# Patient Record
Sex: FEMALE | Race: WHITE | NOT HISPANIC OR LATINO | Employment: FULL TIME
[De-identification: names, ages, dates, MRNs, and addresses within clinical notes are randomized per-mention and may not be internally consistent; named-entity substitution may affect disease eponyms.]

---

## 2017-02-23 ENCOUNTER — RECORDS - HEALTHEAST (OUTPATIENT)
Dept: ADMINISTRATIVE | Facility: OTHER | Age: 22
End: 2017-02-23

## 2017-02-23 LAB
BKR LAB AP ABNORMAL BLEEDING: NO
BKR LAB AP BIRTH CONTROL/HORMONES: NORMAL
BKR LAB AP CERVICAL APPEARANCE: NORMAL
BKR LAB AP GYN ADEQUACY: NORMAL
BKR LAB AP GYN INTERPRETATION: NORMAL
BKR LAB AP HPV REFLEX: NORMAL
BKR LAB AP LMP: NORMAL
BKR LAB AP PATIENT STATUS: NORMAL
BKR LAB AP PREVIOUS ABNORMAL: NORMAL
BKR LAB AP PREVIOUS NORMAL: NORMAL
HIGH RISK?: YES
PATH REPORT.COMMENTS IMP SPEC: NORMAL
RESULT FLAG (HE HISTORICAL CONVERSION): NORMAL

## 2022-08-22 RX ORDER — FLUTICASONE PROPIONATE 50 MCG
SPRAY, SUSPENSION (ML) NASAL
Qty: 16 G | OUTPATIENT
Start: 2022-08-22

## 2022-10-21 ENCOUNTER — APPOINTMENT (OUTPATIENT)
Dept: URBAN - METROPOLITAN AREA CLINIC 260 | Age: 27
Setting detail: DERMATOLOGY
End: 2022-10-21

## 2022-10-21 VITALS — WEIGHT: 170 LBS | HEIGHT: 64 IN

## 2022-10-21 DIAGNOSIS — L71.0 PERIORAL DERMATITIS: ICD-10-CM

## 2022-10-21 DIAGNOSIS — R21 RASH AND OTHER NONSPECIFIC SKIN ERUPTION: ICD-10-CM

## 2022-10-21 PROCEDURE — OTHER PRESCRIPTION: OTHER

## 2022-10-21 PROCEDURE — OTHER COUNSELING: OTHER

## 2022-10-21 PROCEDURE — OTHER PRESCRIPTION MEDICATION MANAGEMENT: OTHER

## 2022-10-21 PROCEDURE — OTHER MIPS QUALITY: OTHER

## 2022-10-21 PROCEDURE — 99203 OFFICE O/P NEW LOW 30 MIN: CPT

## 2022-10-21 RX ORDER — FLUCONAZOLE 150 MG/1
150MG TABLET ORAL
Qty: 2 | Refills: 1 | Status: ERX | COMMUNITY
Start: 2022-10-21

## 2022-10-21 RX ORDER — CLOBETASOL PROPIONATE 0.5 MG/G
CREAM TOPICAL TWICE DAILY
Qty: 45 | Refills: 1 | Status: ERX | COMMUNITY
Start: 2022-10-21

## 2022-10-21 RX ORDER — PIMECROLIMUS 10 MG/G
1% CREAM TOPICAL BID
Qty: 60 | Refills: 2 | Status: ERX | COMMUNITY
Start: 2022-10-21

## 2022-10-21 RX ORDER — FLUOCINOLONE ACETONIDE 0.1 MG/ML
0.01% SOLUTION TOPICAL BID
Qty: 60 | Refills: 2 | Status: ERX | COMMUNITY
Start: 2022-10-21

## 2022-10-21 RX ORDER — CEPHALEXIN 500 MG/1
CAPSULE ORAL BID
Qty: 60 | Refills: 0 | Status: ERX | COMMUNITY
Start: 2022-10-21

## 2022-10-21 ASSESSMENT — LOCATION ZONE DERM
LOCATION ZONE: FINGER
LOCATION ZONE: FACE

## 2022-10-21 ASSESSMENT — LOCATION DETAILED DESCRIPTION DERM
LOCATION DETAILED: LEFT PROXIMAL ULNAR DORSAL INDEX FINGER
LOCATION DETAILED: LEFT INFERIOR MEDIAL MALAR CHEEK

## 2022-10-21 ASSESSMENT — LOCATION SIMPLE DESCRIPTION DERM
LOCATION SIMPLE: LEFT CHEEK
LOCATION SIMPLE: LEFT INDEX FINGER

## 2022-10-21 ASSESSMENT — BSA RASH: BSA RASH: 10

## 2022-10-21 NOTE — HPI: RASH
Is This A New Presentation, Or A Follow-Up?: Rash
Additional History: Maternal grandpa, maternal cousin, mom all have psoriasis. No fh of eczema

## 2022-10-21 NOTE — HPI: RASH (PERIORAL DERMATITIS)
Is This A New Presentation, Or A Follow-Up?: Rash
Additional History: She has been using this on and off for three years and it knocks it down but it never takes it away completely. She’s also taken minocycline and doxycycline for this rash but she gets yeast infections with it.

## 2022-10-21 NOTE — PROCEDURE: PRESCRIPTION MEDICATION MANAGEMENT
Render In Strict Bullet Format?: No
Plan: Recommend either bx or treat today however with bx sometimes if not flared will not show as clear of a pic of what the Dx is. Since not flaring today, will treat with topicals.
Plan: Will try cephalexin instead. And will also refill elidel for her
Initiate Treatment: Clobetasol
Continue Regimen: Fluocinolone soln bid x 2 wks
Detail Level: Zone

## 2022-12-29 ENCOUNTER — HOSPITAL ENCOUNTER (INPATIENT)
Facility: CLINIC | Age: 27
LOS: 4 days | Discharge: HOME | DRG: 914 | End: 2023-01-03
Attending: EMERGENCY MEDICINE | Admitting: PSYCHIATRY & NEUROLOGY
Payer: COMMERCIAL

## 2022-12-29 DIAGNOSIS — F41.1 GAD (GENERALIZED ANXIETY DISORDER): ICD-10-CM

## 2022-12-29 DIAGNOSIS — F63.3 TRICHOTILLOMANIA: ICD-10-CM

## 2022-12-29 DIAGNOSIS — Z20.822 CONTACT WITH AND (SUSPECTED) EXPOSURE TO COVID-19: ICD-10-CM
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
## 2022-12-29 DIAGNOSIS — F41.0 PANIC DISORDER: ICD-10-CM

## 2022-12-29 DIAGNOSIS — F40.10 SOCIAL PHOBIA: ICD-10-CM

## 2022-12-29 DIAGNOSIS — F33.2 MAJOR DEPRESSIVE DISORDER, RECURRENT EPISODE, SEVERE WITH MIXED FEATURES (H): ICD-10-CM

## 2022-12-29 DIAGNOSIS — F33.9 EPISODE OF RECURRENT MAJOR DEPRESSIVE DISORDER, UNSPECIFIED DEPRESSION EPISODE SEVERITY (H): ICD-10-CM

## 2022-12-29 DIAGNOSIS — S10.94XA: ICD-10-CM

## 2022-12-29 DIAGNOSIS — F99 INSOMNIA DUE TO OTHER MENTAL DISORDER: Primary | ICD-10-CM

## 2022-12-29 DIAGNOSIS — T14.91XA SUICIDE ATTEMPT (H): ICD-10-CM

## 2022-12-29 DIAGNOSIS — F33.2 SEVERE RECURRENT MAJOR DEPRESSION WITHOUT PSYCHOTIC FEATURES (H): ICD-10-CM

## 2022-12-29 DIAGNOSIS — F51.05 INSOMNIA DUE TO OTHER MENTAL DISORDER: Primary | ICD-10-CM

## 2022-12-29 LAB
AMPHETAMINES UR QL SCN: NORMAL
BARBITURATES UR QL: NORMAL
BENZODIAZ UR QL: NORMAL
CANNABINOIDS UR QL SCN: NORMAL
COCAINE UR QL: NORMAL
HCG UR QL: NEGATIVE
OPIATES UR QL SCN: NORMAL
SARS-COV-2 RNA RESP QL NAA+PROBE: NEGATIVE

## 2022-12-29 PROCEDURE — 90791 PSYCH DIAGNOSTIC EVALUATION: CPT

## 2022-12-29 PROCEDURE — 250N000013 HC RX MED GY IP 250 OP 250 PS 637: Performed by: FAMILY MEDICINE

## 2022-12-29 PROCEDURE — 80307 DRUG TEST PRSMV CHEM ANLYZR: CPT | Performed by: EMERGENCY MEDICINE

## 2022-12-29 PROCEDURE — 250N000013 HC RX MED GY IP 250 OP 250 PS 637: Performed by: EMERGENCY MEDICINE

## 2022-12-29 PROCEDURE — 81025 URINE PREGNANCY TEST: CPT | Performed by: EMERGENCY MEDICINE

## 2022-12-29 PROCEDURE — C9803 HOPD COVID-19 SPEC COLLECT: HCPCS | Performed by: EMERGENCY MEDICINE

## 2022-12-29 PROCEDURE — U0003 INFECTIOUS AGENT DETECTION BY NUCLEIC ACID (DNA OR RNA); SEVERE ACUTE RESPIRATORY SYNDROME CORONAVIRUS 2 (SARS-COV-2) (CORONAVIRUS DISEASE [COVID-19]), AMPLIFIED PROBE TECHNIQUE, MAKING USE OF HIGH THROUGHPUT TECHNOLOGIES AS DESCRIBED BY CMS-2020-01-R: HCPCS | Performed by: EMERGENCY MEDICINE

## 2022-12-29 PROCEDURE — 99285 EMERGENCY DEPT VISIT HI MDM: CPT | Mod: 25 | Performed by: EMERGENCY MEDICINE

## 2022-12-29 PROCEDURE — 99284 EMERGENCY DEPT VISIT MOD MDM: CPT | Performed by: EMERGENCY MEDICINE

## 2022-12-29 RX ORDER — MULTIVIT WITH MINERALS/LUTEIN
1 TABLET ORAL DAILY
COMMUNITY
End: 2022-12-29

## 2022-12-29 RX ORDER — NAPHAZOLINE HCL 0.012 %
1 DROPS OPHTHALMIC (EYE) DAILY PRN
COMMUNITY
End: 2023-03-08

## 2022-12-29 RX ORDER — MULTIVITAMIN/IRON/FOLIC ACID 18MG-0.4MG
1 TABLET ORAL DAILY
COMMUNITY

## 2022-12-29 RX ORDER — SERTRALINE HYDROCHLORIDE 100 MG/1
100 TABLET, FILM COATED ORAL DAILY
Status: ON HOLD | COMMUNITY
End: 2023-01-03

## 2022-12-29 RX ORDER — TRAZODONE HYDROCHLORIDE 50 MG/1
50 TABLET, FILM COATED ORAL ONCE
Status: COMPLETED | OUTPATIENT
Start: 2022-12-29 | End: 2022-12-29

## 2022-12-29 RX ORDER — TRAZODONE HYDROCHLORIDE 50 MG/1
50 TABLET, FILM COATED ORAL
Status: DISCONTINUED | OUTPATIENT
Start: 2022-12-29 | End: 2022-12-31

## 2022-12-29 RX ORDER — HYDROXYZINE HYDROCHLORIDE 25 MG/1
25-50 TABLET, FILM COATED ORAL 2 TIMES DAILY PRN
Status: DISCONTINUED | OUTPATIENT
Start: 2022-12-29 | End: 2022-12-29

## 2022-12-29 RX ORDER — NORETHINDRONE ACETATE AND ETHINYL ESTRADIOL 1MG-20(21)
1 KIT ORAL DAILY
COMMUNITY

## 2022-12-29 RX ORDER — NORETHINDRONE ACETATE AND ETHINYL ESTRADIOL AND FERROUS FUMARATE 5-7-9-7
1 KIT ORAL DAILY
COMMUNITY
End: 2022-12-29

## 2022-12-29 RX ORDER — HYDROXYZINE HYDROCHLORIDE 25 MG/1
50 TABLET, FILM COATED ORAL ONCE
Status: COMPLETED | OUTPATIENT
Start: 2022-12-29 | End: 2022-12-29

## 2022-12-29 RX ORDER — HYDROXYZINE HYDROCHLORIDE 50 MG/1
50 TABLET, FILM COATED ORAL 2 TIMES DAILY
Status: DISCONTINUED | OUTPATIENT
Start: 2022-12-30 | End: 2022-12-30

## 2022-12-29 RX ORDER — HYDROXYZINE HYDROCHLORIDE 25 MG/1
50 TABLET, FILM COATED ORAL 3 TIMES DAILY PRN
Status: DISCONTINUED | OUTPATIENT
Start: 2022-12-29 | End: 2022-12-29

## 2022-12-29 RX ORDER — SERTRALINE HYDROCHLORIDE 100 MG/1
100 TABLET, FILM COATED ORAL DAILY
Status: DISCONTINUED | OUTPATIENT
Start: 2022-12-30 | End: 2023-01-03 | Stop reason: HOSPADM

## 2022-12-29 RX ADMIN — TRAZODONE HYDROCHLORIDE 25 MG: 50 TABLET ORAL at 20:48

## 2022-12-29 RX ADMIN — HYDROXYZINE HYDROCHLORIDE 50 MG: 25 TABLET, FILM COATED ORAL at 17:36

## 2022-12-29 RX ADMIN — TRAZODONE HYDROCHLORIDE 50 MG: 50 TABLET ORAL at 22:39

## 2022-12-29 ASSESSMENT — ENCOUNTER SYMPTOMS
NERVOUS/ANXIOUS: 1
HALLUCINATIONS: 0
RESPIRATORY NEGATIVE: 1
TROUBLE SWALLOWING: 0
DYSPHORIC MOOD: 1
DECREASED CONCENTRATION: 1
HYPERACTIVE: 0
SLEEP DISTURBANCE: 1
VOICE CHANGE: 0

## 2022-12-29 ASSESSMENT — COLUMBIA-SUICIDE SEVERITY RATING SCALE - C-SSRS
TOTAL  NUMBER OF INTERRUPTED ATTEMPTS LIFETIME: NO
REASONS FOR IDEATION PAST MONTH: COMPLETELY TO END OR STOP THE PAIN (YOU COULDN'T GO ON LIVING WITH THE PAIN OR HOW YOU WERE FEELING)
LETHALITY/MEDICAL DAMAGE CODE MOST RECENT ACTUAL ATTEMPT: MINOR PHYSICAL DAMAGE
5. HAVE YOU STARTED TO WORK OUT OR WORKED OUT THE DETAILS OF HOW TO KILL YOURSELF? DO YOU INTEND TO CARRY OUT THIS PLAN?: NO
LETHALITY/MEDICAL DAMAGE CODE FIRST POTENTIAL ATTEMPT: BEHAVIOR LIKELY TO RESULT IN DEATH DESPITE AVAILABLE MEDICAL CARE
MOST LETHAL DATE: 66471
ATTEMPT LIFETIME: YES
1. HAVE YOU WISHED YOU WERE DEAD OR WISHED YOU COULD GO TO SLEEP AND NOT WAKE UP?: YES
TOTAL  NUMBER OF ACTUAL ATTEMPTS LIFETIME: 1
4. HAVE YOU HAD THESE THOUGHTS AND HAD SOME INTENTION OF ACTING ON THEM?: NO
LETHALITY/MEDICAL DAMAGE CODE MOST LETHAL ACTUAL ATTEMPT: MINOR PHYSICAL DAMAGE
REASONS FOR IDEATION LIFETIME: COMPLETELY TO END OR STOP THE PAIN (YOU COULDN'T GO ON LIVING WITH THE PAIN OR HOW YOU WERE FEELING)
FIRST ATTEMPT DATE: 66471
6. HAVE YOU EVER DONE ANYTHING, STARTED TO DO ANYTHING, OR PREPARED TO DO ANYTHING TO END YOUR LIFE?: NO
ATTEMPT PAST THREE MONTHS: YES
5. HAVE YOU STARTED TO WORK OUT OR WORKED OUT THE DETAILS OF HOW TO KILL YOURSELF? DO YOU INTEND TO CARRY OUT THIS PLAN?: YES
LETHALITY/MEDICAL DAMAGE CODE MOST LETHAL POTENTIAL ATTEMPT: BEHAVIOR LIKELY TO RESULT IN DEATH DESPITE AVAILABLE MEDICAL CARE
LETHALITY/MEDICAL DAMAGE CODE MOST RECENT POTENTIAL ATTEMPT: BEHAVIOR LIKELY TO RESULT IN DEATH DESPITE AVAILABLE MEDICAL CARE
2. HAVE YOU ACTUALLY HAD ANY THOUGHTS OF KILLING YOURSELF?: YES
1. IN THE PAST MONTH, HAVE YOU WISHED YOU WERE DEAD OR WISHED YOU COULD GO TO SLEEP AND NOT WAKE UP?: YES
4. HAVE YOU HAD THESE THOUGHTS AND HAD SOME INTENTION OF ACTING ON THEM?: YES
MOST RECENT DATE: 66471
2. HAVE YOU ACTUALLY HAD ANY THOUGHTS OF KILLING YOURSELF?: YES
TOTAL  NUMBER OF ABORTED OR SELF INTERRUPTED ATTEMPTS LIFETIME: NO
3. HAVE YOU BEEN THINKING ABOUT HOW YOU MIGHT KILL YOURSELF?: NO
TOTAL  NUMBER OF ACTUAL ATTEMPTS PAST 3 MONTHS: 1
LETHALITY/MEDICAL DAMAGE CODE FIRST ACTUAL ATTEMPT: MINOR PHYSICAL DAMAGE

## 2022-12-29 ASSESSMENT — ACTIVITIES OF DAILY LIVING (ADL)
ADLS_ACUITY_SCORE: 35

## 2022-12-29 NOTE — ED TRIAGE NOTES
Here for anxiety/depression for the last few months    Recent (sertraline) change in Lexapro for a month and now changing back to her old med (Sertraline 4-5 days ago). Waking up feeling like she wants to die and waking every 1-2 hours. Today tried to hang herself with a tie from a jacket but states she is too scared to actually kill herself.  Here with her father and states she lives with her parents.     Admits to marijuana use on edi.     Reports some Ha at this time but no there pain.     Denies AH/AH/HI

## 2022-12-29 NOTE — CONSULTS
Diagnostic Evaluation Consultation  Crisis Assessment    Patient was assessed: In Person  Patient location: [facility]   Was a release of information signed: [facility]      Referral Data and Chief Complaint  Jossy Matthews is a 27 year old, who uses she/her pronouns, and presents to the ED with family/friends. Patient is referred to the ED by family/friends. Patient is presenting to the ED for the following concerns: suicide attempt.      Informed Consent and Assessment Methods     Patient is her own guardian. Writer met with patient and explained the crisis assessment process, including applicable information disclosures and limits to confidentiality, assessed understanding of the process, and obtained consent to proceed with the assessment. Patient was observed to be able to participate in the assessment as evidenced by cooperation with interview process. Assessment methods included conducting a formal interview with patient, review of medical records, collaboration with medical staff, and obtaining relevant collateral information from family and community providers when available..     Over the course of this crisis assessment provided reassurance, offered validation, engaged patient in problem solving and disposition planning and worked with patient on safety and aftercare planning. Patient's response to interventions was engaged.     Summary of Patient Situation  Pt is brought to ED after suicide attempt.  Pt reports using bathrobe cord to attempt to hang self last night, shared attempt with father today while pt was at work, reports father brought her to ED.  Pt reports worsening of anxiety, racing thoughts, panic over last three months.  Reports hx of social anxiety starting in high school, had been well managed with Sertraline until recently.  Pt reports having increase in anxiety, crying, panic when going to work; spoke with doctor as felt sertraline may not be working as effectively.   Medication change to Lexapro was started beginning of December.  Pt reports significant side affects including excessive sweating, persistent SI, unable to sleep, racing thoughts, panic attacks, racing heart rate.  Pt reports communicating with doctor and discontinuing lexapro and returning to the sertraline as of 12/15.  Dosing for sertraline started at 50mg, to increase to prior dosage of 150-200mg.  Today was first day at 100 mg.  Pt reports sleep has been disrupted, pt sleeps a few hours, reports walking up with racing thoughts, intense SI.  Reports thoughts are of how to end life.  Reports engaging in research on way to end life.  States last night was feeling overwhelmed by racing thoughts, fear, anxiety and panic.  Pt reports tying bathrobe belt to closet and attempting to hang self.  Pt reports having some blood vessels break around mouth as a result.  Pt has hx of trichotillomania, has been pulling out eye lashes, eyebrow hair and scalp hair with increased frequency.  Pt lives with her mom and dad, feels overly dependent on them, fears she is not able to take care of her self, doesn't know how to do things to demonstrate independence.  Employment has been extremely stressful, has difficulty transition into new situations.  Current job in college admissions, is struggling with anxiety attacks, poor concentration, intense fear of not being capable.   Pt denies HI, SIB, A/V hallucinations.  No trauma hx, admits to occasional marijuana use.           Brief Psychosocial History  Pt is a college graduate, reports living with her parents at this time.  Pt lived in [place] for 4 years returning to the [facility] in 2019, since this time has been living with her parents.  Employment has been challenging as pt struggles with social anxiety, transitioning, self esteem.  Pt reports anxiety was well managed while in [place], reports this time as the best of her life.  States since moving back, has felt unable to manage  independence  Feels guilty and ashamed of being unable to launch.    Significant Clinical History  Pt dx with social anxiety in high school.  Has engaged in individual therapy throughout her life.  Does not have a current therapist, has signed up for [facility].  Medication management through her PCP.  No hx of  hospitalizations, programmatic care.  Denies trauma hx.     Collateral Information  Collateral information from father, Wei.  Wei reports pt has been on a 'downward slide' for 3 months.  Has struggled with social anxiety since high school.  Pt is having a great deal of difficult in employment, struggles to 'acclimate', very anxious.  Reports pt told him today about suicide attempt last night.  Father picked her up from work and brought her in to ED.  Father reports close relationship with daughter, mother and daughter are bit conflicted.      Risk Assessment   Boyd Suicide Severity Rating Scale Full Clinical Version:12/29/22  Suicidal Ideation  1. Wish to be Dead (Lifetime): Yes  1. Wish to be Dead (Past 1 Month): Yes  2. Non-Specific Active Suicidal Thoughts (Lifetime): Yes  2. Non-Specific Active Suicidal Thoughts (Past 1 Month): Yes  3. Active Suicidal Ideation with any Methods (Not Plan) Without Intent to Act (Lifetime): No  3. Active Suicidal Ideation with any Methods (Not Plan) Without Intent to Act (Past 1 Month): Yes  4. Active Suicidal Ideation with Some Intent to Act, Without Specific Plan (Lifetime): No  4. Active Suicidal Ideation with Some Intent to Act, Without Specific Plan (Past 1 Month): Yes  5. Active Suicidal Ideation with Specific Plan and Intent (Lifetime): No  5. Active Suicidal Ideation with Specific Plan and Intent (Past 1 Month): Yes  Active Suicidal Ideation with Specific Plan and Intent Description (Past 1 Month): hanging,  Intensity of Ideation  Most Severe Ideation Rating (Lifetime): 2  Most Severe Ideation Rating (Past 1 Month): 5  Description of Most Severe Ideation  (Past 1 Month): hanging,wanting to die  Frequency (Lifetime): Less than once a week  Frequency (Past 1 Month): Many times each day  Duration (Lifetime): Fleeting, few seconds or minutes  Duration (Past 1 Month): 4-8 hours/most of day  Controllability (Lifetime): Easily able to control thoughts  Controllability (Past 1 Month): Unable to control thoughts  Deterrents (Lifetime): Does not apply  Deterrents (Past 1 Month): Deterrents definitely did not stop you  Reasons for Ideation (Lifetime): Completely to end or stop the pain (You couldn't go on living with the pain or how you were feeling)  Reasons for Ideation (Past 1 Month): Completely to end or stop the pain (You couldn't go on living with the pain or how you were feeling)  Suicidal Behavior  Actual Attempt (Lifetime): Yes  Total Number of Actual Attempts (Lifetime): 1  Actual Attempt Description (Lifetime): attempted to hang self  Actual Attempt (Past 3 Months): Yes  Total Number of Actual Attempts (Past 3 Months): 1  Actual Attempt Description (Past 3 Months): see above  Has subject engaged in non-suicidal self-injurious behavior? (Lifetime): No  Interrupted Attempts (Lifetime): No  Aborted or Self-Interrupted Attempt (Lifetime): No  Preparatory Acts or Behavior (Lifetime): No  C-SSRS Risk (Lifetime/Recent)  Calculated C-SSRS Risk Score (Lifetime/Recent): High Risk      Actual/Potential Lethality (Most Lethal Attempt)  Most Lethal Attempt Date: 12/28/22  Actual Lethality/Medical Damage Code (Most Lethal Attempt): Minor physical damage  Potential Lethality Code (Most Lethal Attempt): Behavior likely to result in death despite available medical care       Validity of evaluation is not impacted by presenting factors during interview Pt is forthcoming, cooperative.   Comments regarding subjective versus objective responses to [place] tool: see narrative  Environmental or Psychosocial Events: work or task failure, helplessness/hopelessness, impulsivity/recklessness  and social isolation  Chronic Risk Factors: chronic and ongoing sleep difficulties, parental mental health issue and serious, persistent mental illness   Warning Signs: seeking access to means to hurt or kill self, talking or writing about death, dying, or suicide, anxiety, agitation, unable to sleep, sleeping all the time and no reason for living, no sense of purpose in life  Protective Factors: strong bond to family unit, community support, or employment, able to access care without barriers and help seeking  Interpretation of Risk Scoring, Risk Mitigation Interventions and Safety Plan:  Pt is at heightened risk due to poor sleep, increased anxiety and panic, instability following failed medication trial      Does the patient have thoughts of harming others? No     Is the patient engaging in sexually inappropriate behavior?  no        Current Substance Abuse     Is there recent substance abuse? Reports using THC occasionally.     Was a urine drug screen or blood alcohol level obtained: Yes pending       Mental Status Exam     Affect: Flat   Appearance: Appropriate    Attention Span/Concentration: Attentive  Eye Contact: Engaged   Fund of Knowledge: Appropriate    Language /Speech Content: Fluent   Language /Speech Volume: Normal    Language /Speech Rate/Productions: Normal    Recent Memory: Intact   Remote Memory: Intact   Mood: Anxious and Depressed    Orientation to Person: Yes    Orientation to Place: Yes   Orientation to Time of Day: Yes    Orientation to Date: Yes    Situation (Do they understand why they are here?): Yes    Psychomotor Behavior: Normal    Thought Content: Suicidal   Thought Form: Intact      History of commitment: No      Medication    Psychotropic medications: Sertraline  Medication changes made in the last two weeks: Yes, pt had a failed trial of Lexapro.  Restarted sertraline mid December.       Current Care Team    [facility]  Psychiatrist: No  Therapist: No  : No     CTSS or ARMHS: No  ACT Team: No  Other: No      Diagnosis    300.23 (F40.10) Social Anxiety Disorder  300.01 (F41.0) Panic Disorder  300.02 (F41.1) Generalized Anxiety Disorder   296.33 (F33.2) Major Depressive Disorder, Recurrent Episode, Severe _ and With anxious distress - primary   F63.3 Trichotillomania     Clinical Summary and Substantiation of Recommendations    Pt presenting to ED with recent suicide attempt by hanging.  Reports increase in symptoms over past three months with increasing SI, panic, poor concentration, disrupted sleep, and depressed mood, symptoms have disrupted employment and daily living.      Admission to Inpatient Level of Care is indicated due to:    1. Patient risk of severity of behavioral health disorder is appropriate to proposed level of care as indicated by:    Imminent Risk of Harm: Very Recent suicide attempt or deliberate act of serious harm to self WITHOUT relief of factors precipitating the attempt or act  And/or:  Behavioral health disorder is present and appropriate for inpatient care with both of the following:     Severe psychiatric, behavioral or other comorbid conditions are appropriate for management at inpatient mental health as indicated by at least one of the following:   o Depressive symptoms, Anxiety and Obsessions or compulsions, Impaired impulse control, judgement, or insight and Other emotional behavioral or behavioral disturbance     Severe dysfunction in daily living is present as indicated by at least one of the following:   o Extreme deterioration in social interactions and Complete inability to maintain any appropriate aspect of personal responsibility in any adult roles    2. Inpatient mental health services are necessary to meet patient needs and at least one of the following:  Specific condition related to admission diagnosis is present and judged likely to further improve at proposed level of  care    3. Situation and expectations are appropriate for inpatient care, as indicated by one of the following:   Around-the-clock medical and nursing care to address symptoms and initiate intervention is required and Patient management/treatment at lower level of care is not feasible or is inappropriate    Disposition    Recommended disposition: Inpatient Mental Health       Reviewed case and recommendations with attending provider. Attending Name: Dr Connor       Attending concurs with disposition: Yes       Patient concurs with disposition: Yes       Guardian concurs with disposition: NA      Final disposition: Inpatient mental health .     Inpatient Details (if applicable):   Is patient admitted voluntarily:Yes      Patient aware of potential for transfer if there is not appropriate placement? Yes       Patient is willing to travel outside of the [facility] [place] for placement? [place] only       Behavioral Intake Notified? 12/29/22  512pm        Assessment Details    Patient interview started at: 223 and completed at: 315.     Total duration spent on the patient case in minutes: 1.75 hrs      CPT code(s) utilized: 35077 - Psychotherapy for Crisis - 60 (30-74*) min       Ashley Haines, CELIA, MSW, LICSW, Psychotherapist  [facility] - Triage & Transition Services  Callback: [phone]

## 2022-12-29 NOTE — ED PROVIDER NOTES
ED Provider Note  [facility]      History     Chief Complaint   Patient presents with     Suicide Attempt     HPI  Jossy Matthews is a 27 year old female with anxiety and MDD who presents to the ED with her father.  He encouraged her to come.  She tied the belt of her bathrobe about her neck yesterday and tied it to the door as well.  She did not lose consciousness but does have petechia around her jaw/chin and a jennifer on her neck from it.  She says the anxiety was just too terrible last night and she has not been sleeping the last month and wanted to end her life because of this.  She says there was a part of her that didn't want to die and so she's not sure if it was actually a suicide attempt.  No prior attempts or prior admissions.  She says that she has been taking sertraline for years but was having anxiety about going to work in December and felt that the sertraline wasn't working anymore.  She therefore talked to her pmd who recommended she try lexapro.   She says the side effects of lexapro was awful. Her anxiety has been worse with panic.  She has not been able to sleep the past month.  She started on sertraline 5-5 days ago.  She started at 50 mg daily and now starting today took sertraline 100 mg.  Her sertraline dose prior to trying lexapro was 200 mg daily.  She has also been pulling her eyebrow hairs.  Denies cd concerns.  She struggles with positive self talk, and feels she is a failure to launch and grow up.  Her parents are supportive.  She feels depressed.  She is having trouble with making decisions.  She is anxious and fearful about everything.  Her mind races. She noticed petechia around her jaw from last night.        Past Medical History  Past Medical History:   Diagnosis Date     Anxiety      Depressive disorder      History reviewed. No pertinent surgical history.  multivitamin (CENTRUM SILVER) tablet  norethindrone-ethinyl estradiol-iron (ESTROSTEP FE)  "1-20/1-30/1-35 MG-MCG tablet  sertraline (ZOLOFT) 100 MG tablet      No Known Allergies  Family History  History reviewed. No pertinent family history.  Social History   Social History     Tobacco Use     Smoking status: Never     Smokeless tobacco: Never   Substance Use Topics     Alcohol use: Not Currently     Drug use: Yes     Types: Marijuana      Past medical history, past surgical history, medications, allergies, family history, and social history were reviewed with the patient. No additional pertinent items.       Review of Systems   HENT: Negative for trouble swallowing and voice change.    Respiratory: Negative.    Skin: Positive for rash.   Psychiatric/Behavioral: Positive for decreased concentration, dysphoric mood and sleep disturbance. Negative for hallucinations and self-injury. Suicidal ideas: not currently. The patient is nervous/anxious. The patient is not hyperactive.    All other systems reviewed and are negative.    A complete review of systems was performed with pertinent positives and negatives noted in the HPI, and all other systems negative.    Physical Exam   BP: 125/77  Pulse: 104  Temp: 99  F (37.2  C)  Resp: 18  Height: 162.6 cm (5' 4\")  Weight: 77.1 kg (170 lb)  SpO2: 98 %  Physical Exam  Vitals and nursing note reviewed.   HENT:      Head: Normocephalic and atraumatic.      Nose: No congestion or rhinorrhea.   Eyes:      Extraocular Movements: Extraocular movements intact.   Neck:      Comments: 4 cm linear superficial abrasion to left neck. Several petechia around jawline.  No airway compromise.    Cardiovascular:      Rate and Rhythm: Tachycardia present.   Pulmonary:      Effort: Pulmonary effort is normal.   Musculoskeletal:         General: No deformity or signs of injury.      Cervical back: Normal range of motion.   Neurological:      Mental Status: She is alert.         ED Course      Procedures       The medical record was reviewed and interpreted.  Current labs reviewed and " interpreted.  Mental Health Risk Assessment      PSS-3    Date and Time Over the past 2 weeks have you felt down, depressed, or hopeless? Over the past 2 weeks have you had thoughts of killing yourself? Have you ever attempted to kill yourself? When did this last happen? User   12/29/22 1342 yes yes yes within the last 24 hours (including today) MM      C-SSRS ([place])    Date and Time Q1 Wished to be Dead (Past Month) Q2 Suicidal Thoughts (Past Month) Q3 Suicidal Thought Method Q4 Suicidal Intent without Specific Plan Q5 Suicide Intent with Specific Plan Q6 Suicide Behavior (Lifetime) Within the Past 3 Months? RETIRED: Level of Risk per Screen Screening Not Complete User   12/29/22 1342 yes yes yes no yes yes -- -- -- MM              Suicide assessment completed by mental health (D.E.C., LCSW, etc.)       Results for orders placed or performed during the hospital encounter of 12/29/22   Asymptomatic COVID-19 Virus (Coronavirus) by PCR Nasopharyngeal     Status: Normal    Specimen: Nasopharyngeal; Swab   Result Value Ref Range    SARS CoV2 PCR Negative Negative    Narrative    Testing was performed using the Xpert Xpress SARS-CoV-2 Assay on the Cepheid Gene-Xpert Instrument Systems. Additional information about this Emergency Use Authorization (EUA) assay can be found via the Lab Guide. This test should be ordered for the detection of SARS-CoV-2 in individuals who meet SARS-CoV-2 clinical and/or epidemiological criteria as well as from individuals without symptoms or other reasons to suspect COVID-19. Test performance for asymptomatic patients has only been established in anterior nasal swab specimens. This test is for in vitro diagnostic use under the FDA EUA for laboratories certified under CLIA to perform high complexity testing. This test has not been FDA cleared or approved. A negative result does not rule out the presence of PCR inhibitors in the specimen or target RNA concentration below the limit of  detection for the assay. The possibility of a false negative should be considered if the patient's recent exposure or clinical presentation suggests COVID-19. This test was validated by the [facility]. This laboratory is certified under the Clinical Laboratory Improvement Amendments (CLIA) as qualified to perform high complexity laboratory testing.     HCG qualitative urine (UPT)     Status: Normal   Result Value Ref Range    hCG Urine Qualitative Negative Negative   Drug abuse screen 1 urine (ED)     Status: Normal   Result Value Ref Range    Amphetamines Urine Screen Negative Screen Negative    Barbiturates Urine Screen Negative Screen Negative    Benzodiazepines Urine Screen Negative Screen Negative    Cannabinoids Urine Screen Negative Screen Negative    Cocaine Urine Screen Negative Screen Negative    Opiates Urine Screen Negative Screen Negative   Urine Drugs of Abuse Screen     Status: Normal    Narrative    The following orders were created for panel order Urine Drugs of Abuse Screen.  Procedure                               Abnormality         Status                     ---------                               -----------         ------                     Drug abuse screen 1 urin...[[id number]]  Normal              Final result                 Please view results for these tests on the individual orders.     Medications   hydrOXYzine (ATARAX) tablet 50 mg (has no administration in time range)   sertraline (ZOLOFT) tablet 100 mg (has no administration in time range)   traZODone (DESYREL) half-tab 25 mg (has no administration in time range)     Or   traZODone (DESYREL) tablet 50 mg (has no administration in time range)   melatonin tablet 5 mg (has no administration in time range)   hydrOXYzine (ATARAX) tablet 50 mg (has no administration in time range)   hydrOXYzine (ATARAX) tablet 25-50 mg (has no administration in time range)        Assessments & Plan (with Medical  Decision Making)   The patient has hx of MDD and GURU and presents to the ED with her father after trying to strangle herself yesterday.  She tied her bathrobe belt around her neck and the door and did attempt enough to cause petechia to form around her jaw line.  No loc. She says she thinks she really didn't want to kill herself and wonders if she should go home because being here is making her tremendously anxious. However she says the anxiety and lack of sleep is what lead to last night's attempt which did seem impulsive.  She is agreeable to admission but is holdable if she wishes to leave.  I will start her on atarax and trazodone and will continue her sertraline while waiting in the ED  No airway compromise on exam. She will board in the ED until an inpatient mental health bed is available.     I have reviewed the nursing notes. I have reviewed the findings, diagnosis, plan and need for follow up with the patient.    Medical Decision Making  The patient presented with a problem that is an acute health issue posing potential threat to life or bodily function.    The patient's evaluation involved:  an assessment requiring an independent historian (father)  review of 3+ prior external note(s) (see separate area of note for details)     The patient's evaluation involved:  ordering and review of 2 test(s) (see separate area of note for details)  discussion of management or test interpretation with another health professional (see separate area of note for details)    The patient's management involved a decision regarding hospitalization.        New Prescriptions    No medications on file       Final diagnoses:   GURU (generalized anxiety disorder)   Suicide attempt (H)   Episode of recurrent major depressive disorder, unspecified depression episode severity (H)       --  Johana Connor MD  [facility] EMERGENCY DEPARTMENT  12/29/2022     Johana Connor MD  12/29/22 1734       Johana Connor MD  12/29/22 1237

## 2022-12-29 NOTE — ED TRIAGE NOTES
Triage Assessment     Row Name 12/29/22 1342       Triage Assessment (Adult)    Airway WDL WDL       Respiratory WDL    Respiratory WDL WDL       Skin Circulation/Temperature WDL    Skin Circulation/Temperature WDL WDL       Cardiac WDL    Cardiac WDL WDL       Peripheral/Neurovascular WDL    Peripheral Neurovascular WDL WDL       Cognitive/Neuro/Behavioral WDL    Cognitive/Neuro/Behavioral WDL WDL

## 2022-12-30 PROCEDURE — 250N000013 HC RX MED GY IP 250 OP 250 PS 637: Performed by: PSYCHIATRY & NEUROLOGY

## 2022-12-30 PROCEDURE — 124N000002 HC R&B MH UMMC

## 2022-12-30 PROCEDURE — 250N000013 HC RX MED GY IP 250 OP 250 PS 637: Performed by: EMERGENCY MEDICINE

## 2022-12-30 PROCEDURE — 99223 1ST HOSP IP/OBS HIGH 75: CPT | Mod: AI | Performed by: PSYCHIATRY & NEUROLOGY

## 2022-12-30 PROCEDURE — 90853 GROUP PSYCHOTHERAPY: CPT

## 2022-12-30 RX ORDER — ACETAMINOPHEN 325 MG/1
650 TABLET ORAL EVERY 4 HOURS PRN
Status: DISCONTINUED | OUTPATIENT
Start: 2022-12-30 | End: 2023-01-03 | Stop reason: HOSPADM

## 2022-12-30 RX ORDER — AMOXICILLIN 250 MG
1 CAPSULE ORAL 2 TIMES DAILY PRN
Status: DISCONTINUED | OUTPATIENT
Start: 2022-12-30 | End: 2023-01-03 | Stop reason: HOSPADM

## 2022-12-30 RX ORDER — HYDROXYZINE HYDROCHLORIDE 50 MG/1
50 TABLET, FILM COATED ORAL EVERY 4 HOURS PRN
Status: DISCONTINUED | OUTPATIENT
Start: 2022-12-30 | End: 2023-01-03 | Stop reason: HOSPADM

## 2022-12-30 RX ORDER — OLANZAPINE 10 MG/1
10 TABLET ORAL 3 TIMES DAILY PRN
Status: DISCONTINUED | OUTPATIENT
Start: 2022-12-30 | End: 2023-01-03 | Stop reason: HOSPADM

## 2022-12-30 RX ORDER — OLANZAPINE 10 MG/2ML
10 INJECTION, POWDER, FOR SOLUTION INTRAMUSCULAR 3 TIMES DAILY PRN
Status: DISCONTINUED | OUTPATIENT
Start: 2022-12-30 | End: 2023-01-03 | Stop reason: HOSPADM

## 2022-12-30 RX ORDER — BUSPIRONE HYDROCHLORIDE 10 MG/1
10 TABLET ORAL 3 TIMES DAILY
Status: DISCONTINUED | OUTPATIENT
Start: 2022-12-30 | End: 2023-01-03 | Stop reason: HOSPADM

## 2022-12-30 RX ORDER — TRAZODONE HYDROCHLORIDE 50 MG/1
50 TABLET, FILM COATED ORAL
Status: DISCONTINUED | OUTPATIENT
Start: 2022-12-30 | End: 2022-12-30

## 2022-12-30 RX ORDER — MAGNESIUM HYDROXIDE/ALUMINUM HYDROXICE/SIMETHICONE 120; 1200; 1200 MG/30ML; MG/30ML; MG/30ML
30 SUSPENSION ORAL EVERY 4 HOURS PRN
Status: DISCONTINUED | OUTPATIENT
Start: 2022-12-30 | End: 2023-01-03 | Stop reason: HOSPADM

## 2022-12-30 RX ORDER — HYDROXYZINE HYDROCHLORIDE 25 MG/1
25 TABLET, FILM COATED ORAL EVERY 4 HOURS PRN
Status: DISCONTINUED | OUTPATIENT
Start: 2022-12-30 | End: 2022-12-30

## 2022-12-30 RX ORDER — NORETHINDRONE ACETATE AND ETHINYL ESTRADIOL 1MG-20(21)
1 KIT ORAL DAILY
Status: DISCONTINUED | OUTPATIENT
Start: 2022-12-30 | End: 2023-01-03 | Stop reason: HOSPADM

## 2022-12-30 RX ADMIN — BUSPIRONE HYDROCHLORIDE 10 MG: 10 TABLET ORAL at 15:46

## 2022-12-30 RX ADMIN — HYDROXYZINE HYDROCHLORIDE 50 MG: 50 TABLET ORAL at 19:24

## 2022-12-30 RX ADMIN — NORETHINDRONE ACETATE AND ETHINYL ESTRADIOL 1 TABLET: KIT at 21:07

## 2022-12-30 RX ADMIN — HYDROXYZINE HYDROCHLORIDE 50 MG: 50 TABLET ORAL at 14:19

## 2022-12-30 RX ADMIN — SERTRALINE HYDROCHLORIDE 100 MG: 100 TABLET ORAL at 08:30

## 2022-12-30 RX ADMIN — DIVALPROEX SODIUM 750 MG: 500 TABLET, FILM COATED, EXTENDED RELEASE ORAL at 15:46

## 2022-12-30 RX ADMIN — BUSPIRONE HYDROCHLORIDE 10 MG: 10 TABLET ORAL at 19:24

## 2022-12-30 RX ADMIN — HYDROXYZINE HYDROCHLORIDE 25 MG: 25 TABLET ORAL at 03:58

## 2022-12-30 RX ADMIN — HYDROXYZINE HYDROCHLORIDE 50 MG: 50 TABLET ORAL at 08:30

## 2022-12-30 RX ADMIN — TRAZODONE HYDROCHLORIDE 50 MG: 50 TABLET ORAL at 21:09

## 2022-12-30 ASSESSMENT — ACTIVITIES OF DAILY LIVING (ADL)
ORAL_HYGIENE: INDEPENDENT
ORAL_HYGIENE: INDEPENDENT
DRESS: SCRUBS (BEHAVIORAL HEALTH)
ADLS_ACUITY_SCORE: 28
ADLS_ACUITY_SCORE: 28
HEARING_DIFFICULTY_OR_DEAF: NO
ADLS_ACUITY_SCORE: 45
HYGIENE/GROOMING: INDEPENDENT
DRESS: SCRUBS (BEHAVIORAL HEALTH)
ADLS_ACUITY_SCORE: 28
HYGIENE/GROOMING: INDEPENDENT
ADLS_ACUITY_SCORE: 28
LAUNDRY: WITH SUPERVISION
TOILETING_ISSUES: NO
DIFFICULTY_EATING/SWALLOWING: NO
ADLS_ACUITY_SCORE: 28
ORAL_HYGIENE: INDEPENDENT
DRESSING/BATHING_DIFFICULTY: NO
ADLS_ACUITY_SCORE: 28
WALKING_OR_CLIMBING_STAIRS_DIFFICULTY: NO
ADLS_ACUITY_SCORE: 35
CHANGE_IN_FUNCTIONAL_STATUS_SINCE_ONSET_OF_CURRENT_ILLNESS/INJURY: NO
FALL_HISTORY_WITHIN_LAST_SIX_MONTHS: NO
WEAR_GLASSES_OR_BLIND: NO
DOING_ERRANDS_INDEPENDENTLY_DIFFICULTY: NO
ADLS_ACUITY_SCORE: 28
HYGIENE/GROOMING: INDEPENDENT
CONCENTRATING,_REMEMBERING_OR_MAKING_DECISIONS_DIFFICULTY: NO
ADLS_ACUITY_SCORE: 28
DRESS: INDEPENDENT
DIFFICULTY_COMMUNICATING: NO
ADLS_ACUITY_SCORE: 28
ADLS_ACUITY_SCORE: 28

## 2022-12-30 NOTE — PLAN OF CARE
12/30/22 0428   Patient Belongings   Did you bring any home meds/supplements to the hospital?  No   Patient Belongings locker   Patient Belongings Put in Hospital Secure Location (Security or Locker, etc.) other (see comments)   Belongings Search Yes   Clothing Search Yes   Second Staff B.SDaquan     In locker:  2 sweater  Bra  Pair of underwear  Pair of socks  Pair of shoes  Pair of pants  Coat    A               Admission:  I am responsible for any personal items that are not sent to the safe or pharmacy.  [facility] is not responsible for loss, theft or damage of any property in my possession.    Signature:  _________________________________ Date: _______  Time: _____                                              Staff Signature:  ____________________________ Date: ________  Time: _____      2nd Staff person, if patient is unable/unwilling to sign:    Signature: ________________________________ Date: ________  Time: _____     Discharge:  [facility] has returned all of my personal belongings:    Signature: _________________________________ Date: ________  Time: _____                                          Staff Signature:  ____________________________ Date: ________  Time: _____

## 2022-12-30 NOTE — PROGRESS NOTES
Triage & Transition Services, Extended Care    Client Name: Jossy Matthews    Date: December 29, 2022  Service Type:  Group Therapy  Session Start Time:  5:55P    Session End Time: 6:10P  Session Length: 15min  Site Location: [facility]  Attendees: Patient and other group members  Facilitator: Rajani Irwin     Topic:   Dealing with Setbacks    Intervention:    Group process: support, challenge, affirm, psycho-education.     Response:  Patient did participate in group. Behavior in group was appropriate. Patient shared their goals. With verbal prompts, patient was able to identify goals and positive self-talk.        Rajani Irwin

## 2022-12-30 NOTE — PLAN OF CARE
12/30/22 1607   Individualization/Patient Specific Goals   Patient Personal Strengths community support;expressive of emotions;family/social support;intellectual cognitive skills;interests/hobbies;medication/treatment adherence;positive educational history;positive vocational history;socioeconomic stability   Patient Vulnerabilities lacks insight into illness;poor impulse control   Interprofessional Rounds   Participants CTC;psychiatrist;nursing   Behavioral Team Discussion   Participants Dr. Isidra Banda; Facundo Oviedo, Emy Macias [facility]   Progress new admission   Anticipated length of stay 1 week   Continued Stay Criteria/Rationale Patient requires stabilization of psychiatric symptoms   Plan Patient will receive psychiatric and nursing cares on unit, will be offered OT and psychotherapy groups. CTC will assist with treatment/disposition planning, as well as plans for outpatient services for following discharge.   Anticipated Discharge Disposition home with family     PRECAUTIONS AND SAFETY    Behavioral Orders   Procedures    Code 1 - Restrict to Unit    Discontinue 1:1 attendant for suicide risk     Order Specific Question:   I have performed an in person assessment of the patient     Answer:   Based on this assessment the patient no longer requires a one on one attendant at this point in time.    Routine Programming     As clinically indicated    Status 15     Every 15 minutes.    Suicide precautions     Patients on Suicide Precautions should have a Combination Diet ordered that includes a Diet selection(s) AND a Behavioral Tray selection for Safe Tray - with utensils, or Safe Tray - NO utensils         Safety  Safety WDL: WDL  Patient Location: patient room, own  Observed Behavior: calm  Safety Measures: suicide check-in completed, suicide assessment completed, safety rounds completed  Suicidality: Status 15, Unpredictable frequency of checking on patient           Evelyn

## 2022-12-30 NOTE — H&P
PSYCHIATRY HISTORY AND PHYSICAL         DATE OF SERVICE:   12/30/2022         CHIEF COMPLAINT:     Depression, anxiety, insomnia, suicide attempt prior to admission          HISTORY OF PRESENT ILLNESS:     This is a 27 year old   female with major depressive disorder and anxiety.  She was brought to the emergency room by her father.    I coordinated care by reviewing the ER in the inpatient unit.    According to ER Dr. Nikolas Vaughn's report from 12/29/2022, patient tied the belt of her bottle of around her neck on 12/28/2022 and she tied into the door as well.  She did not lose consciousness but she had petechiae was around her jaw and marks on her neck from it.  She reported that the anxiety was so terrible and she was not able to sleep the last month and she wanted to end her life because of that.  She says part of her did not want to die so she was not sure if it was a suicide attempt.  No previous suicide attempts.  She reported that she was on Zoloft for years and she was anxious about going to work in December so she thought that medication did not work.  Her PMD recommended to try Lexapro.  She said that she had side effects and anxiety was worse and she started having panic attacks.  She was not able to sleep in the last month.  She started taking Zoloft 50 mg 5 days ago and she increased to 200 mg daily.  She says that the dose before starting Lexapro was 200 mg daily.  She also has hypokalemia and she pulls her eyebrows here out.  She s reported that she saw herself as a failure to lounge and blowup.  She has supportive parents.  She reported difficulties with making decisions, anxiety and fearful about everything.  She reported racing thoughts.  She reported to the nurse that she was waking up every morning feeling that she wanted to die.  She could not sleep through the night and she woke up every 1-2 hours.  According to the  Ashley Haines 's note from 12/29/2022 patient reported worsening of  anxiety and racing thoughts and panic attacks in the last 3 months.  She reported that social anxiety started in high school and it responded well to Zoloft until recently.  She reported that she had decrease in anxiety, panic attacks and crying when she was supposed to go to work.  She started taking Lexapro in the beginning of December and she had excessive sweating and persistent suicidal thoughts, inability to sleep, racing thoughts, racing heart rate.  Lexapro was discontinued and she started Zoloft on December 15, 1950 milligrams daily with plan to gradually increase dose to 200 mg which was the maximum dose before she stopped taking Zoloft.  The dose was increased to 100 mg on December 29, 2022.  She reported intense suicidal thoughts about to end her life.  She reported that she was doing research on the way to end her life.  She reported that she felt overwhelmed with racing thoughts, fear, anxiety and panic so she tied bilateral belt to the closet and attempted to hang herself.  She had bruises on her neck.  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  She reported that she did not know how to take care of herself and she was dependent on her parents to help her.  Employment is stressful.  She works in college admissions.  She has anxiety, decreased concentration, fear inadequacy.  She uses marijuana occasionally.  She graduated from college and she lived in [place] for 4 years.  She came back to [place] in 2019 and she has been living with her parents.  She reported that she had anxiety when she was in [place] but she was able to manage it and that was the best part of her life.  She felt guilty and ashamed of being unable to lounge.  She reported anxiety starting in high school.  She had individual psychotherapy all her life.  She does not have psychotherapist at this time, but she signed out for [facility] health clinic.  A primary care prescribes her  medications.  Emergency room  talked collateral information from patient's father.  He reported that patient's mental health has been deteriorating in the last 3 months.  She has had social anxiety since high school.  She has difficult time in employment, difficulties with adjustment, anxious, she told her father about last night suicide attempt and he picked her up from work and brought her to the emergency room.  He reported that he has better relationship with patient and that her relationship with her mother is somewhat conflicted.  During the interview with me she reports that she did not sleep well last night.  Staff reported that she slept about 1.25 hours last night.    During the interview with me pt says she did well on Zoloft x 10 years. She developed depression and anxiety around age 16 and 17. She reports that stress increases depression and anxiety. She says she lived in [place] x 4 years, while she was  to Chinese man. She says she went there to study Chinese. She did some jobs she found stressful. 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 She says that life was easier in [place] where she had free medical care, easy transportation, stores everywhere. She lived in [facility].She says she decided to divorce her  because they grew apart. She wanted divorce, and she came back to [place] in 2019. She says adjustment has been very difficult.   She says she is not able to function in dependently . She lives with her parents. She says her mother was lieutenant, and she behaves like that at home, and has control of everyone, and that cause friction between 2 of them. She says that her mother pushes her to be more functional and that causes anxiety. She says that  She started new job in July and it is very stressful. 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 She  says that she had difficulties adjusting ,and depression and anxiety were getting worse. She says that she asked her PMD for different medication, because she thought Zoloft did not work. She had severe anxiety, racing thoughts, problem with sleep. Her doctor ordered Lexapro on 12/2/22. She says depression and anxiety and insomnia got worse. She says she had panic atacs, she was waking up every one to two hours. She says she could not tolerate going to work . She had racing thoughts. She says that in July her boss used to tell her to slow down because she was too energetic.She says her symptoms got worse in October. She says her boss and people at work tried to help. She says that she started to have insomnia , she could not focus, had more racing thoughts.   She says that she started having suicidal thoughts and thinking about plan. She says she could not tolerate the level of depression, anxiety and insomnia, so she asked her PMD to puth her back on Zoloft. She says she was put back on Zoloft on 12/22, not 12/15, as it was mentioned in the ER notes. She says that yesterday was the first day that it was raised to 100 mg , with plan to put her back on 200 mg which was the maximum dose before switch to Lexapro. Her symptoms continued and she attempted  to kill self on 12/28/22.She says she tied bathrobe belt around her neck and in closet and attempted to hang self, but she stopped it because the pressure of a belt on her neck was painful, and she pulled back . She had marks on her neck. She minimizes her symptoms. She says she told her father about suicide, and he brought her to the hospital.We discussed diagnosis and tx plan. We discussed depression with mixed features and bipolar disorder in differential diagnosis. We discussed anxiety and insomnia. She had insomnia racing thoughts, increased energy, depression and mood lability. We discussed adding Buspar for anxiety and Depakote for mood stabilization. She is on  birth control and she does not plan to get pregnant soon, so Depakote can be used. She had serious suicide attempt and she minimizes her symptoms and she tries to justify it. She is not safe for discharge . She is voluntary , but holdable. We discussed referral to psychiatrist and psychotherapist and she agrees. She will think about day tx.           CHEMICAL DEPENDENCY HISTORY:     History   Drug Use     Types: Marijuana     Rare marijuana use     Social History    Substance and Sexual Activity      Alcohol use: Not Currently    Pt denies     History   Smoking Status     Never   Smokeless Tobacco     Never     Chemical dependency treatments:pt denies   DUI:pt denies   Withdrawal seizures:pt denies          PAST PSYCHIATRIC HISTORY:     Hospitalizations: This is 1 st  ECT: pt denies   Suicide Attempts:this is 1 st   Gun Access: pt denies  Community Supports: her family         PAST MEDICAL HISTORY:     Past Medical History:   Diagnosis Date     Anxiety      Depressive disorder        Medications:     hydrOXYzine  50 mg Oral BID     sertraline  100 mg Oral Daily     Medications as needed: acetaminophen, alum & mag hydroxide-simethicone, hydrOXYzine, melatonin, OLANZapine **OR** OLANZapine, senna-docusate, traZODone **OR** traZODone    ALLERGY: Patient has no known allergies.    Last menstrual period:on BCP pregnancy protection  History of traumatic brain injury:pt denies   History of seizures:pt denies          MEDICATIONS:     No current outpatient medications on file.   Admission Medication History Completed by Pharmacy     See [facility] Navigator for allergy information, preferred outpatient pharmacy, prior to admission medications and immunization status.      Medication History Sources:     Surescripts (fill history), CareEverywhere, and patient interview (via iPad)     Changes made to PTA medication list (reason):    Added: per patient, fill history  ? OTC Excedrin extra strength PRN headaches  ? Junel FE  1/20 mg-mcg tablet    Deleted: per patient, fill history  ? Estrostep FE 1-20/1-30/1-35 mg-mcg tablet (taking Junel instead)    Changed: per patient  ? Centrum Silver multivitamin --> Centrum Adult multivitmain     Additional Information:    Junel - patient did not bring home supply to hospital, will need medication supplied by central pharmacy when ordered.              Prior to Admission medications    Medication Sig Last Dose Taking? Auth Provider Long Term End Date   aspirin-acetaminophen-caffeine (EXCEDRIN EXTRA STRENGTH) 250-250-65 MG tablet Take 1 tablet by mouth daily as needed for headaches Past Month Yes Unknown, Entered By History       multivitamin w/minerals (CENTRUM ADULTS) tablet Take 1 tablet by mouth daily 12/29/2022 at AM Yes Unknown, Entered By History       norethindrone-ethinyl estradiol (JUNEL FE 1/20) 1-20 MG-MCG tablet Take 1 tablet by mouth daily 12/29/2022 at AM Yes Unknown, Entered By History Yes     sertraline (ZOLOFT) 100 MG tablet Take 100 mg by mouth daily 12/29/2022 Yes Reported, Patient Yes       Date completed: 12/29/22   Medication history completed by:    Nicollette McMann, PharmD, BCPS  [facility]  Emergency Department: Ascom *[id number]     Medication adherence: Yes  Medication side effects: Worsening symptoms on Lexapro  Benefit: Symptom reduction         ROS:   As per history of present illness otherwise reminder of review of systems is negative for:general,eyes, ears, nose, throat, neck, respiratory, cardiovascular, gastrointestinal, genitourinary,musculo-sceletal,neurological, hematological,skin and endocrine system.         FAMILY HISTORY:   History reviewed. No pertinent family history.   Psychiatric:she says on maternal side   Suicide attempt:half uncle on maternal side   Chemical Dependency:pt says he father is recovered drug user  Diabetes:pt denies   Dyslipidemia:positive per pt's report  HTN:positive per pt's report         SOCIAL  "HISTORY:     Social History     Tobacco Use     Smoking status: Never     Smokeless tobacco: Never   Substance Use Topics     Alcohol use: Not Currently     Drug use: Yes     Types: Marijuana       Patient was born and raised in [place].  Parents .  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   Merital status: x 4 years,  x 3 years  Children:no  Living situation:with her parents   Legal problems:pt denies    service:pt denies   Financial problems:pt denies   Strength:compassionate person  Weakness: procrastination, lack of self discipline   Hobby:pt does not identify         MENTAL STATUS EXAM:   General: fair hygiene, cooperative  Orientation:to self, place, time  Speech: normal in rate and tone  Language: intact  Thought processes: racing, often moves from topic to topic, but comes back to previous topic when redirected   Thought content: denies delusions and hallucinations  Suicidal thoughts: denies now, but she had suicide attempt with hanging prior to admission   Homicidal thoughts: pt denies Associations: connected  Affect: Anxious  Mood: depressed  Intellectual functioning: average  Fund of knowledge: consistent with education and experience   Attention and concentration: decreased  Memory: intact  Gait: steady  Psycho-motor activity: no agitation  Muscle tone:no atrophy, no involuntary movement  Insight and judgment: inadequate. Minimizing sumptoms         PHYSICAL EXAM:   Vitals: BP (!) 132/91 (BP Location: Left arm, Patient Position: Chair, Cuff Size: Adult Regular)   Pulse 92   Temp 98.5  F (36.9  C) (Tympanic)   Resp 18   Ht 1.626 m (5' 4.02\")   Wt 76.7 kg (169 lb)   SpO2 97%   BMI 28.99 kg/m    From observation:  General:patient is not in acute distress  HEENT: head is normocephalic/atraumatic,  Pupils equal " ,round, extraocular movements intact  Neck: Supple, visible marks from trying to hang self   Lungs: breathing is unlabored   Extremities: No cyanosis, edema, clubbing  Skin: Normal color, no rash           LABS:     personally reviewed    Latest Reference Range & Units 12/29/22 13:53 12/29/22 14:53   HCG Qual Urine Negative   Negative   SARS CoV2 PCR Negative  Negative    Amphetamine Qual Urine Screen Negative   Screen Negative   Cocaine Qual Urine Screen Negative   Screen Negative   Benzodiazepine Qual Ur Screen Negative   Screen Negative   Opiates Qualitative Urine Screen Negative   Screen Negative   Cannabinoids Qual Urine Screen Negative   Screen Negative   Barbiturates Qual Urine Screen Negative   Screen Negative         DIAGNOSIS:     Major depressive disorder recurrent severe with mixed features  Generalized anxiety disorder   Insomnia due to other mental disorder   S/P suicide attempt     Principal Problem:      Major depressive disorder recurrent severe with mixed features     Active Problem List:    Major depressive disorder  recurrent severe with mixed features   Generalized anxiety disorder  Insomnia due to other mental disorder          ASSESSMENT  AND TREATMENT  RECOMMENDATIONS:     Patient was admitted to psychiatric unit for safety, stabilization and medication management.  She has had depression and anxiety since she was 18 y/o, x 10 years. She responded well to Zoloft until July of this year when she could not cope with stress at work any more, and depression and anxiety got worse to the point that .she wanted medication change, so her PMD ordered Lexapro on 12/2/2022. She says that depression and anxiety got even worse. She was not able to sleep, had racing thought and on 12/28/22 she attempted to hang herself. She then decided to interrupt  the attempt because of the pain of the belt on her neck. She has marks on her neck. She minimizes her symptoms . I reminded her of severity of suicide attempt  and I explained that I would have to put her on 72 hour hold if she would request to leaver. She agrees to stay as a voluntary patient.  We discussed diagnosis and treatment and these are the recommendations for treatment:    Medications:  Start Depakote  mg for mood stabilization,check level in 5 days   Start Buspar 10 mg tid for anxiety  Start Hydroxyzine 50 mg qid for anxiety   Start Melatonin 5 mg at bedtime for sleep  Start Trazodone  mg at bedtime prn sleep  Zoloft 100 mg qam for depression    We discussed side effects, benefits and alternative treatment and patient agrees.   Full code   Suicide precautions   will obtain collateral information and  make outpatient referrals to psychiatrist and psychotherapist. Pt will think about Day tx.  Rule 25 for chemical dependency treatment not needed   Patient will attend groups.  Staff will  provide emotional support.  Labs reviewed personally:  Consults: As needed according to patient's symptoms report    The patient was seen, medical record reviewed, care coordinated with the team.    This note was created with the help of Dragon  dictation system.  All typing and grammatical errors or context distortion are unintentional and inherent to software.    Isidra Banda MD    Initial Certification I certify that the inpatient psychiatric facility admission was medically necessary for treatment which could reasonably be expected to improve the patient s condition.       I estimate TBD  days of hospitalization is necessary for proper treatment of the patient. My plans for post-hospital care this patient are :medications, appointments      Isidra Banda MD

## 2022-12-30 NOTE — PLAN OF CARE
"Patient is a 27year old female admitted due to suicide attempt. Patient is voluntary. Covid test is negative, HcG and drugs screen is negative.   Patient has a history of MDD and anxiety. Patient reports that this is her first inpatient admission. She saw a therapist one time and her next appointment is in February.   Patient came in anxious but cooperative with the search. She had a full range affect and was appeared to be forthcoming with information. Patient denies current SI, SIB, HI or hallucinations. She admitted to past history of SI and admitted to smoking \"weed on edi.\" Patient stated \"I just want to go home.\" She required reassurance that she was safe and was not on a hold. Patient contracts for safety.    [facility] admit profile completed. Orders received from the [facility]. Patient is on SI precautions                        "

## 2022-12-30 NOTE — PLAN OF CARE
Jossy Matthews  December 29, 2022  Plan of Care Hand-off Note     Patient Care Path: Inpatient Mental Health    Plan for Care:     Pt presenting to ED with recent suicide attempt by hanging.  Reports increase in symptoms over past three months with increasing SI, panic, poor concentration, disrupted sleep, and depressed mood, symptoms have disrupted employment and daily living.       Admission to Inpatient Level of Care is indicated due to:     1. Patient risk of severity of behavioral health disorder is appropriate to proposed level of care as indicated by:               Imminent Risk of Harm: Very Recent suicide attempt or deliberate act of serious harm to self WITHOUT relief of factors precipitating the attempt or act  And/or:  Behavioral health disorder is present and appropriate for inpatient care with both of the following:     Severe psychiatric, behavioral or other comorbid conditions are appropriate for management at inpatient mental health as indicated by at least one of the following:   ? Depressive symptoms, Anxiety and Obsessions or compulsions, Impaired impulse control, judgement, or insight and Other emotional behavioral or behavioral disturbance     Severe dysfunction in daily living is present as indicated by at least one of the following:   ? Extreme deterioration in social interactions and Complete inability to maintain any appropriate aspect of personal responsibility in any adult roles     2. Inpatient mental health services are necessary to meet patient needs and at least one of the following:  Specific condition related to admission diagnosis is present and judged likely to further improve at proposed level of care     3. Situation and expectations are appropriate for inpatient care, as indicated by one of the following:   Around-the-clock medical and nursing care to address symptoms and initiate intervention is required and Patient management/treatment at lower level of care is not feasible  or is inappropriate    Critical Safety Issues: anxiety, panic, SI,     Overview:  This patient is a child/adolescent: No    This patient has additional special visitor precautions: No    Legal Status: Voluntary    Contacts:   Father, Wei    Psychiatry Consult:  Adult Psychiatry Consult requested related to medication consultation for anxiety. Psychiatry IP Consult Order Placed: No not at this time    Updated Attending Provider regarding plan of care.    Ashley Haines, [email or web address]

## 2022-12-30 NOTE — PLAN OF CARE
OCCUPATIONAL THERAPY GROUP NOTE:     12/30/22 1605   Group Therapy Session   Group Attendance attended group session   Time Session Began 1100   Time Session Ended 1200   Total Time (minutes) 60   Total # Attendees 3   Group Type task skill   Group Topic Covered structured socialization;relaxation techniques;problem-solving;coping skills/lifestyle management   Group Session Detail OT Clinic Group   Patient Response/Contribution cooperative with task;fluctuating affect;anxious;restless;pleasant upon approach   Patient Participation Detail Occupational therapy clinic. Purpose of structured group: exploration/development of positive coping skills, engagement in creative expression and clinical observation of social, cognitive, and kinesthetic performance skills. Pt response: Pt actively participated in occupational therapy clinic. Pt was able to ask for assistance as needed, and independently initiate a novel, multi-step, goal-directed task with min assist for task setup. However, Pt appeared to be having some difficulty concentrating, and was observed to switch projects several times during her time in group. Pt appeared comfortable interacting with peers and writer, and was pleasant and bright upon arrival to group. However, as the group progressed she was observed to become anxious and more withdrawn from peers and activity. She expressed to writer that she was feeling ready to go home, and that she was having some discomfort being on the unit. She verbalized feeling anxious about not knowing what was going to happen or when she would be able to talk with her provider. Writer offered sensory calming tools and supportive therapeutic discussion. Prior to the end of group, Pt verbalized feeling more relaxed and she was looking forward to taking a warm shower.

## 2022-12-30 NOTE — PHARMACY-ADMISSION MEDICATION HISTORY
Admission Medication History Completed by Pharmacy    See [facility] Admission Navigator for allergy information, preferred outpatient pharmacy, prior to admission medications and immunization status.     Medication History Sources:     Surescripts (fill history), CareEverywhere, and patient interview (via iPad)    Changes made to PTA medication list (reason):    Added: per patient, fill history  o OTC Excedrin extra strength PRN headaches  o Junel FE 1/20 mg-mcg tablet    Deleted: per patient, fill history  o Estrostep FE 1-20/1-30/1-35 mg-mcg tablet (taking Junel instead)    Changed: per patient  o Centrum Silver multivitamin --> Centrum Adult multivitmain    Additional Information:    Junel - patient did not bring home supply to hospital, will need medication supplied by central pharmacy when ordered.    Prior to Admission medications    Medication Sig Last Dose Taking? Auth Provider Long Term End Date   aspirin-acetaminophen-caffeine (EXCEDRIN EXTRA STRENGTH) 250-250-65 MG tablet Take 1 tablet by mouth daily as needed for headaches Past Month Yes Unknown, Entered By History     multivitamin w/minerals (CENTRUM ADULTS) tablet Take 1 tablet by mouth daily 12/29/2022 at AM Yes Unknown, Entered By History     norethindrone-ethinyl estradiol (JUNEL FE 1/20) 1-20 MG-MCG tablet Take 1 tablet by mouth daily 12/29/2022 at AM Yes Unknown, Entered By History Yes    sertraline (ZOLOFT) 100 MG tablet Take 100 mg by mouth daily 12/29/2022 Yes Reported, Patient Yes        Date completed: 12/29/22    Medication history completed by:     Nicollette McMann, PharmD, BCPS  [facility]  Emergency Department: Ascom *[id number]

## 2022-12-30 NOTE — CONSULTS
Name:  Jossy Matthews  : 1995  Date:   2022  Location of patient: [facility]   Location of doctor: Office [facility]  Length of consult:  10 minutes       Phone Consult:    27 year old female with Anxiety Disorder and Depressive Disorder who is recommended for mental health unit admission. Suicide attempt due to hanging.    Medically cleared by the ED Provider.    Voluntary for admission to inpatient mental health unit.    Discussed with staff on site:  yes, Alejandra Garcia M.D.  Psychiatry

## 2022-12-30 NOTE — PLAN OF CARE
Initial Psychosocial Assessment:    I have reviewed the chart, met with the patient, and developed Care Plan.  Information for assessment was obtained from:     Chart review and patient interview. WR met with patient in her room. Patient shared that it had felt like a very long difficult day. She stated she feels very uncomfortable on unit and like she is in skilled nursing. WR provided active listening and encouragement, answered some general questions about the unit. WR did not complete some items on this assessment due to patient appearing overwhelmed and tired from long day. WR will continue to work with patient next week on treatment planning and connection to outpatient services.     Presenting Problem:  Patient is a 27 year old female who uses she/her pronouns and presented to [facility] ED on 12/29/2022 by her family following a suicide attempt in context of increasing symptoms of depression and anxiety including racing thoughts and panic, trichotillomania  and disrupted sleep for past three months. Patient attempted suicide by tying a belt from her bathrobe around her neck and to closet door. Patient stopped attempt on her own, and reported it to her father the following day. Patient has history of depression, anxiety and trichotillomania and was admitted to [facility] voluntarily on 12/29/2022.     History of Mental Health and Chemical Dependency:  Mental Health History:  Patient reports history of social anxiety starting in high school. Has managed this with medications in past (see below). Also has hx of trichotillomania and has been pulling out eye lashes, eyebrow hair and scalp hair with increased frequency.      Previous psychiatric hospitalizations/admissions: this is patients first psychiatric admission.    Previous suicide attempts: Yes: One attempt prior to coming to hospital on 12/28/22, tied belt of robe around neck and tied to closet door, stopped self.     Historical diagnoses:  Depression, anxiety, trichotillomania.    Attitude/adherence to medications prior to admission: Took sertraline (Zoloft) in past and this used to manage anxiety/depression symptoms well but recently seems to not be helping. Pt was switched by outpatient provider to Lexapro, but experienced side affects of  excessive sweating, persistent SI, unable to sleep, racing thoughts, panic attacks, racing heart rate. Was switched back to sertraline at lower dose (50 mg)  with plan to titrate up to previous dose (150-200mg).      Previous services utilized (and perceived helpfulness): Has had therapy in past, currently does not have provider but has signed up for better help.     Chemical Dependency History:    Summary of use: occasional use of THC, reported last use on christmas.     Chemical dependency treatment/detox: none reported      Family Description (Constellation, Family Psychiatric History):  Constellation/Background:     Upbringing: not assessed this day    Current marital status: Single and Never     Number of children/grandchildren: no children    Family Psychiatric/Substance Use History:     Not assessed this day    Significant Life Events (Illness, Abuse, Trauma, Death):  Patient lived in [place] for 4 years returning to [place] in 2019. Told ED  being in [place] was the best time of her life.    Living Situation:  Patient lives with her mom and dad.    Educational Background:  Patient is a college graduate.     Occupational History:    Employment status: Employed- works in college admissions; ability to work is currently being impacted by anxiety symptoms: anxiety attacks, poor concentration, fear of not being capable.    Financial Status:    Health insurance: [employer] [place]    Income source: employed    Legal Issues:    Legal status during current admission: Voluntary    Legal guardian: No    History of civil commitment: No    Other: No    Ethnic/Cultural Considerations:    Primary language:  English    Requires : No    Cultural influences: not assessed this day    Spiritual Orientation:  Not assessed this day.     Service History:    No    Social Functioning (organization, interests):  Not assessed this day.      Current Outpatient Treatment Providers/Team:  Primary Care Provider: Silvano Vizcaino [facility]    Social Service Assessment/Plan:    Team-Specific:    Patient will have psychiatric assessment and medication management by the psychiatrist. Medications will be reviewed and adjusted per DO/MD/APRN CNP as indicated. The treatment team will continue to assess and stabilize the patient's mental health symptoms with the use of medications and therapeutic programming. Hospital staff will provide a safe environment and a therapeutic milieu. Staff will continue to assess patient as needed. Patient will participate in unit groups and activities. Patient will receive individual and group support on the unit.      CTC will do individual inpatient treatment planning and after care planning. CTC will discuss options for increasing community supports with the patient. CTC will coordinate with outpatient providers and will place referrals to ensure appropriate follow up care is in place.

## 2022-12-30 NOTE — ED NOTES
Patient transferred to station 10 by wheel chair.  Report given to ERMELINDA Caceres. Patient is aware of her transfer and is agreeable with sharing a room. VSS. Patient is calm and cooperative but anxious because it's her first time going IP. All belongings sent with the patient. No concerns at this time.

## 2022-12-30 NOTE — PLAN OF CARE
Pt has a full range affect with anxious mood. Pt rates anxiety at 4/10 and depression 0/10. Pt rates pain at 0/10. Pt reports no SI/HI/SIB and contracts for safety. Pt denies any hallucinations and not noted responding to any internal stimuli. Pt was medication compliant. No reported or observed medication side effects. Pt was visible on unit and engaged with select peers. Continue current POC.    Plan of Care Reviewed With: patient Plan of Care Reviewed With: patient    Overall Patient Progress: no changeOverall Patient Progress: no change

## 2022-12-30 NOTE — PLAN OF CARE
Patient appeared asleep for 1.25hrs. patient did not have any behavioral or medical concerns and remain on 15min checks. RN will continue to monitor.

## 2022-12-30 NOTE — ED NOTES
"Patient spent the evening in her room watching tv. Patient was anxious about being here and requested to speak with an  in the morning to come up with a safety plan so that she could potentially go home with outpatient resources. Patient said she regrets attempting to hang herself and when asked if she felt she would be safe going home responded with \"possibly.\" Patient was agreeable to this plan and is willing to stay the night here. Patient was provided with a fidget toy and lavender patch to help with feeling anxious. Patient was calm, pleasant, and cooperative.  "

## 2022-12-31 PROBLEM — F99 INSOMNIA DUE TO OTHER MENTAL DISORDER: Status: ACTIVE | Noted: 2022-12-30

## 2022-12-31 PROBLEM — F51.05 INSOMNIA DUE TO OTHER MENTAL DISORDER: Status: ACTIVE | Noted: 2022-12-30

## 2022-12-31 PROCEDURE — H2032 ACTIVITY THERAPY, PER 15 MIN: HCPCS

## 2022-12-31 PROCEDURE — 250N000013 HC RX MED GY IP 250 OP 250 PS 637: Performed by: PSYCHIATRY & NEUROLOGY

## 2022-12-31 PROCEDURE — 250N000013 HC RX MED GY IP 250 OP 250 PS 637: Performed by: EMERGENCY MEDICINE

## 2022-12-31 PROCEDURE — 124N000002 HC R&B MH UMMC

## 2022-12-31 RX ORDER — TRAZODONE HYDROCHLORIDE 50 MG/1
50-100 TABLET, FILM COATED ORAL
Status: DISCONTINUED | OUTPATIENT
Start: 2022-12-31 | End: 2023-01-03 | Stop reason: HOSPADM

## 2022-12-31 RX ADMIN — TRAZODONE HYDROCHLORIDE 50 MG: 50 TABLET ORAL at 20:04

## 2022-12-31 RX ADMIN — BUSPIRONE HYDROCHLORIDE 10 MG: 10 TABLET ORAL at 20:04

## 2022-12-31 RX ADMIN — SERTRALINE HYDROCHLORIDE 100 MG: 100 TABLET ORAL at 08:50

## 2022-12-31 RX ADMIN — BUSPIRONE HYDROCHLORIDE 10 MG: 10 TABLET ORAL at 13:43

## 2022-12-31 RX ADMIN — NORETHINDRONE ACETATE AND ETHINYL ESTRADIOL 1 TABLET: KIT at 08:51

## 2022-12-31 RX ADMIN — BUSPIRONE HYDROCHLORIDE 10 MG: 10 TABLET ORAL at 08:50

## 2022-12-31 RX ADMIN — DIVALPROEX SODIUM 750 MG: 500 TABLET, FILM COATED, EXTENDED RELEASE ORAL at 08:49

## 2022-12-31 ASSESSMENT — ACTIVITIES OF DAILY LIVING (ADL)
ADLS_ACUITY_SCORE: 28
HYGIENE/GROOMING: INDEPENDENT
ADLS_ACUITY_SCORE: 28
HYGIENE/GROOMING: INDEPENDENT
ADLS_ACUITY_SCORE: 28
ADLS_ACUITY_SCORE: 28
ORAL_HYGIENE: INDEPENDENT
ORAL_HYGIENE: INDEPENDENT
ADLS_ACUITY_SCORE: 28

## 2022-12-31 NOTE — PLAN OF CARE
Problem: Suicide Risk  Goal: Absence of Self-Harm  Outcome: Progressing   Goal Outcome Evaluation:    Plan of Care Reviewed With: patient      Pt denied all psych symptoms except that she rated her anxiety at 4/10 at the start of this shift and declined prn med that was offered.  She stated that she was getting more anxious about being her in the hospital later this evening.  Scheduled med and prn was given with good effects per pt.  Family was her to visit and brought some cloths and birth control med for pt.  MD was called and pt took today's dose per order.  Pt took trazodone for sleep per her request.  Pt had a good appetite and fluid intake was good.

## 2022-12-31 NOTE — PROGRESS NOTES
12/31/22 1516   Group Therapy Session   Group Attendance attended group session   Time Session Began 1015   Time Session Ended 1100   Total Time (minutes) 30   Total # Attendees 4   Group Type task skill   Group Session Detail Happiness tear collage for concentration, organization, planning, creative expression, positive and future based thinking, decision making, follow through, mood stabilization, reality based activity, building self-esteem, fostering hope for a sustainable recovery, and an opportunity to socialization   Patient Participation Detail Pt joined group but then stayed for the rest of the group session.  Pt was pleasant and invested in her collage.  Pt shared positive themes of her work and voiced that she was struggling with feeling bored on the unit.  Pt relayed that she is used to working and having a more heavily scheduled routine.  Pt was polite, pleasant and social with staff and peers throughout the group session.  No charge.

## 2022-12-31 NOTE — PROVIDER NOTIFICATION
12/30/22 2000   Patient Belongings   Did you bring any home meds/supplements to the hospital?  Yes  (Birth control pills, given to the nurse/pharmacy)   Patient Belongings locker  (haflinger gray shoes, socks X 5 pairs, sweatshirts x 5, stretchy pants x 2, blue jeans x 1, underwear x 4, sprts bra x 2.)   Belongings Search Yes   Clothing Search Yes     Patient informed about belongings, no order to use own clothes yet.  Godiva chocolate/open box placed in the locked for pt's use.

## 2022-12-31 NOTE — PROGRESS NOTES
12/30/22 1800   Group Therapy Session   Group Attendance attended group session   Time Session Began 1615   Time Session Ended 1700   Total Time (minutes) 45   Total # Attendees 5   Group Type psychotherapeutic   Group Topic Covered cognitive therapy techniques   Group Session Detail DBt wave of emotions   Patient Response/Contribution cooperative with task;discussed personal experience with topic;listened actively     Pt said she felt anxious, she had a good conversation by phone with her parents. Her affect was flat and blunted and she had a slow response time. She listened more than she spoke. She did toward the end of group ask some questions about what is DBT.

## 2022-12-31 NOTE — PLAN OF CARE
Patient appeared asleep for 7hrs. Patient did not have any behavioral or medical concerns and remain on 15min checks. RN will continue to monitor.

## 2022-12-31 NOTE — PLAN OF CARE
Pt has a full range affect with anxious mood. Pt rates anxiety at 4/10 and depression 0/10. Pt rates pain at 0/10. Pt reports no SI/HI/SIB and contracts for safety. Pt denies any hallucinations and not noted responding to any internal stimuli. Pt was medication compliant. Pt was educated on Depakote and Buspar. Discussed coping techniques for anxiety and to use them before it gets high. No reported or observed medication side effects. Pt was visible on unit and engaged with select peers. Continue current POC.    Plan of Care Reviewed With: patient Plan of Care Reviewed With: patient    Overall Patient Progress: improvingOverall Patient Progress: improving

## 2023-01-01 PROCEDURE — 124N000002 HC R&B MH UMMC

## 2023-01-01 PROCEDURE — 250N000013 HC RX MED GY IP 250 OP 250 PS 637: Performed by: PSYCHIATRY & NEUROLOGY

## 2023-01-01 PROCEDURE — 250N000013 HC RX MED GY IP 250 OP 250 PS 637: Performed by: EMERGENCY MEDICINE

## 2023-01-01 RX ADMIN — HYDROXYZINE HYDROCHLORIDE 50 MG: 50 TABLET ORAL at 20:48

## 2023-01-01 RX ADMIN — NORETHINDRONE ACETATE AND ETHINYL ESTRADIOL 1 TABLET: KIT at 09:13

## 2023-01-01 RX ADMIN — BUSPIRONE HYDROCHLORIDE 10 MG: 10 TABLET ORAL at 08:33

## 2023-01-01 RX ADMIN — HYDROXYZINE HYDROCHLORIDE 50 MG: 50 TABLET ORAL at 03:35

## 2023-01-01 RX ADMIN — BUSPIRONE HYDROCHLORIDE 10 MG: 10 TABLET ORAL at 20:02

## 2023-01-01 RX ADMIN — TRAZODONE HYDROCHLORIDE 50 MG: 50 TABLET ORAL at 20:02

## 2023-01-01 RX ADMIN — BUSPIRONE HYDROCHLORIDE 10 MG: 10 TABLET ORAL at 14:05

## 2023-01-01 RX ADMIN — SERTRALINE HYDROCHLORIDE 100 MG: 100 TABLET ORAL at 08:32

## 2023-01-01 RX ADMIN — Medication 5 MG: at 03:35

## 2023-01-01 RX ADMIN — DIVALPROEX SODIUM 750 MG: 500 TABLET, FILM COATED, EXTENDED RELEASE ORAL at 08:32

## 2023-01-01 ASSESSMENT — ACTIVITIES OF DAILY LIVING (ADL)
ADLS_ACUITY_SCORE: 28
DRESS: INDEPENDENT
ADLS_ACUITY_SCORE: 28
ADLS_ACUITY_SCORE: 28
HYGIENE/GROOMING: INDEPENDENT
ADLS_ACUITY_SCORE: 28
HYGIENE/GROOMING: INDEPENDENT
ADLS_ACUITY_SCORE: 28
ORAL_HYGIENE: INDEPENDENT
ORAL_HYGIENE: INDEPENDENT
DRESS: SCRUBS (BEHAVIORAL HEALTH)

## 2023-01-01 NOTE — PLAN OF CARE
Problem: Suicide Risk  Goal: Absence of Self-Harm  Outcome: Progressing   Goal Outcome Evaluation:    Plan of Care Reviewed With: patient      Pt had a good evening.  Her parents were here to visit.  Pt stated that she is doing better today.  She endorsed her anxiety level at 1/10 and said it is because she is here in the hospital.  There was no S.I behavior noted.  Pt interacted well with others and she spent the most part of her evening out of her room.

## 2023-01-01 NOTE — PLAN OF CARE
Pt has a full range affect with anxious mood. Pt rates anxiety at 3/10 and depression 0/10. Pt rates pain at 0/10. Pt reports no SI/HI/SIB and contracts for safety. Pt denies any hallucinations and not noted responding to any internal stimuli. Pt attended yoga group. No reported or observed medication side effects. Pt was visible on unit and engaged with select peers. Pt took a shower prior to lunch. Continue current POC.    Plan of Care Reviewed With: patient Plan of Care Reviewed With: patient    Overall Patient Progress: no changeOverall Patient Progress: no change

## 2023-01-01 NOTE — PROGRESS NOTES
Provider updated on HR of 104-125. Pt was encouraged to drink extra water. No new orders at this time.

## 2023-01-01 NOTE — PROGRESS NOTES
12/31/22 1900   Group Therapy Session   Group Attendance attended group session   Time Session Began 1600   Time Session Ended 1650   Total Time (minutes) 45   Total # Attendees 7   Group Type recreation   Group Topic Covered relaxation techniques   Group Session Detail stress reduction   Patient Response/Contribution cooperative with task   Patient Participation Detail Pt actively participated in a structured Therapeutic Recreation group with a focus on leisure participation, socializing, and exercise. Pt participated in the guided exercise for the full duration of the group. Pt followed along, engaged in the guided chair exercise routine and added to the discussion prompts throughout the routine.  Pt was encouraged to use positive imagery with the deep breathing and stretching to foster relaxation, improves focus, and reduce stress.

## 2023-01-01 NOTE — PLAN OF CARE
Patient appeared asleep for 6.25hrs. Patient did not have any behavioral or medical concerns and remain on 15min checks. She received hydroxyzine and melatonin PRN at about 0330 and went back to her room. RN will continue to monitor.

## 2023-01-02 PROCEDURE — 250N000013 HC RX MED GY IP 250 OP 250 PS 637: Performed by: PSYCHIATRY & NEUROLOGY

## 2023-01-02 PROCEDURE — 124N000002 HC R&B MH UMMC

## 2023-01-02 PROCEDURE — G0177 OPPS/PHP; TRAIN & EDUC SERV: HCPCS

## 2023-01-02 PROCEDURE — 99233 SBSQ HOSP IP/OBS HIGH 50: CPT | Performed by: PSYCHIATRY & NEUROLOGY

## 2023-01-02 PROCEDURE — 250N000013 HC RX MED GY IP 250 OP 250 PS 637: Performed by: EMERGENCY MEDICINE

## 2023-01-02 RX ORDER — GABAPENTIN 300 MG/1
300 CAPSULE ORAL 3 TIMES DAILY
Status: DISCONTINUED | OUTPATIENT
Start: 2023-01-02 | End: 2023-01-03 | Stop reason: HOSPADM

## 2023-01-02 RX ADMIN — SERTRALINE HYDROCHLORIDE 100 MG: 100 TABLET ORAL at 08:51

## 2023-01-02 RX ADMIN — TRAZODONE HYDROCHLORIDE 50 MG: 50 TABLET ORAL at 20:43

## 2023-01-02 RX ADMIN — BUSPIRONE HYDROCHLORIDE 10 MG: 10 TABLET ORAL at 08:51

## 2023-01-02 RX ADMIN — NORETHINDRONE ACETATE AND ETHINYL ESTRADIOL 1 TABLET: KIT at 08:52

## 2023-01-02 RX ADMIN — BUSPIRONE HYDROCHLORIDE 10 MG: 10 TABLET ORAL at 14:10

## 2023-01-02 RX ADMIN — HYDROXYZINE HYDROCHLORIDE 50 MG: 50 TABLET ORAL at 00:18

## 2023-01-02 RX ADMIN — BUSPIRONE HYDROCHLORIDE 10 MG: 10 TABLET ORAL at 20:43

## 2023-01-02 RX ADMIN — TRAZODONE HYDROCHLORIDE 50 MG: 50 TABLET ORAL at 00:18

## 2023-01-02 RX ADMIN — GABAPENTIN 300 MG: 300 CAPSULE ORAL at 20:43

## 2023-01-02 RX ADMIN — GABAPENTIN 300 MG: 300 CAPSULE ORAL at 16:17

## 2023-01-02 RX ADMIN — ACETAMINOPHEN 650 MG: 325 TABLET, FILM COATED ORAL at 09:47

## 2023-01-02 RX ADMIN — DIVALPROEX SODIUM 750 MG: 500 TABLET, FILM COATED, EXTENDED RELEASE ORAL at 08:51

## 2023-01-02 ASSESSMENT — ACTIVITIES OF DAILY LIVING (ADL)
ADLS_ACUITY_SCORE: 28
HYGIENE/GROOMING: INDEPENDENT
ADLS_ACUITY_SCORE: 28
ORAL_HYGIENE: INDEPENDENT
ORAL_HYGIENE: INDEPENDENT
ADLS_ACUITY_SCORE: 28
HYGIENE/GROOMING: INDEPENDENT
DRESS: SCRUBS (BEHAVIORAL HEALTH)
ADLS_ACUITY_SCORE: 28
DRESS: INDEPENDENT
ADLS_ACUITY_SCORE: 28
ADLS_ACUITY_SCORE: 28

## 2023-01-02 NOTE — PLAN OF CARE
Assessment/Intervention/Current Symptoms and Care Coordination:  Attend Team Meeting  Review Chart  Provider thinks Pt ready to Discharge 1/3/23.      Discharge Plan or Goal:  Discharge Tuesday 1/3.  Will need psychiatry.  Nothing open today unable to make appointments.    Barriers to Discharge:  Pt needs discharge plan.  Refer for psychiatry.     Referral Status:  Primary Care Provider: Rosetta Lowe [facility]    Psychiatry pending.    Legal Status:   Voluntary

## 2023-01-02 NOTE — PLAN OF CARE
"Pt has a full range affect with anxious mood. Pt rates anxiety at 3/10 and depression 0/10. Pt received tylenol for headache with relief. Pt reports no SI/HI/SIB and contracts for safety. Pt denies any hallucinations and not noted responding to any internal stimuli. Pt attended groups. No observed medication side effects. Pt reports \"not feeling right\" from the new meds but unable to explain. Pt was visible on unit and engaged with select peers. Pt would like to discharge and do out patient therapy but not sure what kind of therapy. Continue current POC.    Plan of Care Reviewed With: patient Plan of Care Reviewed With: patient    Overall Patient Progress: no changeOverall Patient Progress: no change           "

## 2023-01-02 NOTE — PLAN OF CARE
Patient appeared asleep for 6.75hrs. Patient did not have any behavioral or medical concerns and remain on 15min checks. Patient received trazodone 50mg PRN and Hydroxyzine 50mg PRN at about 0015. RN will continue to monitor.

## 2023-01-02 NOTE — PLAN OF CARE
Problem: Suicidal Behavior  Goal: Suicidal Behavior is Absent or Managed  Outcome: Progressing   Goal Outcome Evaluation:    Plan of Care Reviewed With: patient      Pt endorsed her anxiety level at 3/10 and said that it is due to her being in the hospital.  Staff spent a long time with patient to discuss her concerns.  She said that she was anxious about her job and that was part of the reason she came to the hospital.  She said that she does not like her job because it involves  and sitting at the desk, that she got stressed out about it as she is not able to talk to different people like her previous temporary job.  Pt stated that her mother wanted her to continue with the job for at least the probationary period to keep her benefits before making a change.  Staff suggested that she discuss her concerns about the job with her parents if it has become such a problem that she has to be in the hospital.  Staff asked her about the outcome of their discussion after her parents left, and she said that her parents will let them know at her job that she is in the hospital at this time so that she could keep all her benefits for now.  Pt also stated that she was worried about the effects of the medication that she is taking and that she is not sure if the meds will help her.  Staff encouraged her to focus her attention on the treatment plan and let the doctor know how she was feeling.  Pt expressed appreciation for the conversation and suggestions. There was no S.I behavior noted.  Pt interacted well with others and she spent the most part of her evening out of her room.  Trazodone and hydroxyzine was given prn at bedtime.

## 2023-01-02 NOTE — DISCHARGE INSTRUCTIONS
Behavioral Discharge Planning and Instructions    Summary: You were admitted on 12/29/2022 due to Suicide Attempt.  You were treated by Dr. Isidra Banda and discharged on 1/03/2023 from [facility] to Home    Main Diagnosis: Major depressive disorder recurrent severe with mixed features    Health Care Follow-up:   You were referred to [facility]'s Intensive Outpatient Program (IOP). You completed an assessment on 1/3/2023 with the goal of starting the program on 1/5/2023. Prior to starting, you will need to set up a Jianshu account. Please call [phone] for assistance in setting up Jianshu or complete the following: [email or web address].     Appointment Date/Time: Monday, January 23 at 2:30pm via telehealth  Psychiatrist: YEMI Huitron CNP  Address: [facility]  Phone Number: [phone]   Note: This appointment is to bridge you to your new provider Dr. Bagley.    Appointment Date/Time: Wednesday, March 8 at 1:30pm in-person    Psychiatrist/Primary Care Giver: Ofelia Fenton MD  Address: [facility] [address] [place], [place] [address] [address]     Phone Number: [phone]      Attend all scheduled appointments with your outpatient providers. Call at least 24 hours in advance if you need to reschedule an appointment to ensure continued access to your outpatient providers.     Major Treatments, Procedures and Findings:  You were provided with: a psychiatric assessment, assessed for medical stability, medication evaluation and/or management, group therapy, milieu management, and medical interventions    Symptoms to Report: feeling more aggressive, increased confusion, losing more sleep, mood getting worse, or thoughts of suicide    Early warning signs can include: increased depression or anxiety sleep disturbances increased thoughts or behaviors of suicide or self-harm  increased unusual thinking, such as paranoia or  "hearing voices    Safety and Wellness:  Take all medicines as directed.  Make no changes unless your doctor suggests them.      Follow treatment recommendations.  Refrain from alcohol and non-prescribed drugs.  Ask your support system to help you reduce your access to items that could harm yourself or others. Items could include:  Firearms  Medicines (both prescribed and over-the-counter)  Knives and other sharp objects  Ropes and like materials  Car keys  If there is a concern for safety, call 911. If there is a concern for safety, call 911.    Resources:   Crisis Intervention: 977.845.4245 or 902-127-5391 (TTY: 182.758.8453).  Call anytime for help.  National [place] on Mental Illness (www.[place].freya.org): 611.541.7917 or 820-377-7252.  Alcoholics Anonymous (www.alcoholics-anonymous.org): Check your phone book for your local chapter.  Suicide Awareness Voices of Education (SAVE) (www.save.org): 932-183-ZWWN (4881)  National Suicide Prevention Line (www.mentalhealthmn.org): 583-394-GTBB (7837)  Mental Health Consumer/Survivor Network of [place] (www.mhcsn.net): 992.779.1965 or 144-639-4842  Mental Health Association of [place] (www.mentalhealth.org): 675.201.4273 or 623-285-3086  Self- Management and Recovery Training., SMART-- Toll free: 397.792.7038  www.[employer].org  [facility] Crisis Response - Adult 780 609-8463  [facility] Rapid Response 878-771-2365  Text 4 Life: txt \"LIFE\" to 11351 for immediate support and crisis intervention  Crisis text line: Text \"[place]\" to 860318. Free, confidential, 24/7.  Crisis Intervention: 448.309.3519 or 893-006-5660. Call anytime for help.     General Medication Instructions:   See your medication sheet(s) for instructions.   Take all medicines as directed.  Make no changes unless your doctor suggests them.   Go to all your doctor visits.  Be sure to have all your required lab tests. This way, your medicines can be refilled on time.  Do not use any drugs not prescribed by your " doctor.  Avoid alcohol.    Advance Directives:   Scanned document on file with [employer]? No scanned doc  Is document scanned? Pt states no documents  Honoring Choices Your Rights Handout: Informed and given  Was more information offered? Pt declined    The Treatment team has appreciated the opportunity to work with you. If you have any questions or concerns about your recent admission, you can contact the unit which can receive your call 24 hours a day, 7 days a week. They will be able to get in touch with a Provider if needed. The unit number is [phone].

## 2023-01-03 ENCOUNTER — BEH TREATMENT PLAN (OUTPATIENT)
Dept: BEHAVIORAL HEALTH | Facility: CLINIC | Age: 28
End: 2023-01-03
Attending: PSYCHIATRY & NEUROLOGY
Payer: COMMERCIAL

## 2023-01-03 ENCOUNTER — TELEPHONE (OUTPATIENT)
Dept: BEHAVIORAL HEALTH | Facility: CLINIC | Age: 28
End: 2023-01-03

## 2023-01-03 VITALS
OXYGEN SATURATION: 99 % | HEART RATE: 90 BPM | DIASTOLIC BLOOD PRESSURE: 80 MMHG | TEMPERATURE: 97.5 F | HEIGHT: 64 IN | BODY MASS INDEX: 28.85 KG/M2 | SYSTOLIC BLOOD PRESSURE: 110 MMHG | WEIGHT: 169 LBS | RESPIRATION RATE: 16 BRPM

## 2023-01-03 PROCEDURE — 250N000013 HC RX MED GY IP 250 OP 250 PS 637: Performed by: EMERGENCY MEDICINE

## 2023-01-03 PROCEDURE — G0177 OPPS/PHP; TRAIN & EDUC SERV: HCPCS

## 2023-01-03 PROCEDURE — 90791 PSYCH DIAGNOSTIC EVALUATION: CPT | Performed by: PSYCHOLOGIST

## 2023-01-03 PROCEDURE — 250N000013 HC RX MED GY IP 250 OP 250 PS 637: Performed by: PSYCHIATRY & NEUROLOGY

## 2023-01-03 PROCEDURE — 99239 HOSP IP/OBS DSCHRG MGMT >30: CPT | Performed by: PSYCHIATRY & NEUROLOGY

## 2023-01-03 RX ORDER — TRAZODONE HYDROCHLORIDE 50 MG/1
50-100 TABLET, FILM COATED ORAL
Qty: 60 TABLET | Refills: 0 | Status: SHIPPED | OUTPATIENT
Start: 2023-01-03 | End: 2023-01-17

## 2023-01-03 RX ORDER — HYDROXYZINE HYDROCHLORIDE 50 MG/1
50 TABLET, FILM COATED ORAL EVERY 4 HOURS PRN
Qty: 30 TABLET | Refills: 0 | Status: SHIPPED | OUTPATIENT
Start: 2023-01-03 | End: 2023-01-27 | Stop reason: SINTOL

## 2023-01-03 RX ORDER — MULTIPLE VITAMINS W/ MINERALS TAB 9MG-400MCG
1 TAB ORAL DAILY
Status: DISCONTINUED | OUTPATIENT
Start: 2023-01-03 | End: 2023-01-03 | Stop reason: HOSPADM

## 2023-01-03 RX ORDER — NORETHINDRONE ACETATE AND ETHINYL ESTRADIOL 1MG-20(21)
1 KIT ORAL DAILY
Status: DISCONTINUED | OUTPATIENT
Start: 2023-01-03 | End: 2023-01-03

## 2023-01-03 RX ORDER — BUSPIRONE HYDROCHLORIDE 10 MG/1
10 TABLET ORAL 3 TIMES DAILY
Qty: 90 TABLET | Refills: 0 | Status: SHIPPED | OUTPATIENT
Start: 2023-01-03 | End: 2023-01-27

## 2023-01-03 RX ORDER — GABAPENTIN 300 MG/1
300 CAPSULE ORAL 3 TIMES DAILY
Qty: 90 CAPSULE | Refills: 0 | Status: SHIPPED | OUTPATIENT
Start: 2023-01-03 | End: 2023-01-27 | Stop reason: DRUGHIGH

## 2023-01-03 RX ORDER — SERTRALINE HYDROCHLORIDE 100 MG/1
100 TABLET, FILM COATED ORAL DAILY
Qty: 30 TABLET | Refills: 0 | Status: SHIPPED | OUTPATIENT
Start: 2023-01-03 | End: 2023-01-23

## 2023-01-03 RX ADMIN — GABAPENTIN 300 MG: 300 CAPSULE ORAL at 09:19

## 2023-01-03 RX ADMIN — MULTIPLE VITAMINS W/ MINERALS TAB 1 TABLET: TAB at 13:19

## 2023-01-03 RX ADMIN — NORETHINDRONE ACETATE AND ETHINYL ESTRADIOL 1 TABLET: KIT at 09:19

## 2023-01-03 RX ADMIN — GABAPENTIN 300 MG: 300 CAPSULE ORAL at 13:19

## 2023-01-03 RX ADMIN — BUSPIRONE HYDROCHLORIDE 10 MG: 10 TABLET ORAL at 09:19

## 2023-01-03 RX ADMIN — BUSPIRONE HYDROCHLORIDE 10 MG: 10 TABLET ORAL at 13:19

## 2023-01-03 RX ADMIN — SERTRALINE HYDROCHLORIDE 100 MG: 100 TABLET ORAL at 09:19

## 2023-01-03 ASSESSMENT — ACTIVITIES OF DAILY LIVING (ADL)
ADLS_ACUITY_SCORE: 28

## 2023-01-03 ASSESSMENT — ANXIETY QUESTIONNAIRES
4. TROUBLE RELAXING: NEARLY EVERY DAY
IF YOU CHECKED OFF ANY PROBLEMS ON THIS QUESTIONNAIRE, HOW DIFFICULT HAVE THESE PROBLEMS MADE IT FOR YOU TO DO YOUR WORK, TAKE CARE OF THINGS AT HOME, OR GET ALONG WITH OTHER PEOPLE: EXTREMELY DIFFICULT
6. BECOMING EASILY ANNOYED OR IRRITABLE: NOT AT ALL
3. WORRYING TOO MUCH ABOUT DIFFERENT THINGS: NEARLY EVERY DAY
5. BEING SO RESTLESS THAT IT IS HARD TO SIT STILL: SEVERAL DAYS
1. FEELING NERVOUS, ANXIOUS, OR ON EDGE: NEARLY EVERY DAY
2. NOT BEING ABLE TO STOP OR CONTROL WORRYING: NEARLY EVERY DAY
GAD7 TOTAL SCORE: 14
7. FEELING AFRAID AS IF SOMETHING AWFUL MIGHT HAPPEN: SEVERAL DAYS
GAD7 TOTAL SCORE: 14

## 2023-01-03 ASSESSMENT — COLUMBIA-SUICIDE SEVERITY RATING SCALE - C-SSRS
4. HAVE YOU HAD THESE THOUGHTS AND HAD SOME INTENTION OF ACTING ON THEM?: NO
6. HAVE YOU EVER DONE ANYTHING, STARTED TO DO ANYTHING, OR PREPARED TO DO ANYTHING TO END YOUR LIFE?: YES
5. HAVE YOU STARTED TO WORK OUT OR WORKED OUT THE DETAILS OF HOW TO KILL YOURSELF? DO YOU INTEND TO CARRY OUT THIS PLAN?: NO
1. IN THE PAST MONTH, HAVE YOU WISHED YOU WERE DEAD OR WISHED YOU COULD GO TO SLEEP AND NOT WAKE UP?: YES
3. HAVE YOU BEEN THINKING ABOUT HOW YOU MIGHT KILL YOURSELF?: YES
2. HAVE YOU ACTUALLY HAD ANY THOUGHTS OF KILLING YOURSELF IN THE PAST MONTH?: YES

## 2023-01-03 ASSESSMENT — PATIENT HEALTH QUESTIONNAIRE - PHQ9: SUM OF ALL RESPONSES TO PHQ QUESTIONS 1-9: 16

## 2023-01-03 NOTE — TELEPHONE ENCOUNTER
Mental Health &Addiction (MH&A)Transition Clinic (TC):     Provides Patient Support While Waiting to Access Programmatic and Outpatient MH&A Care and Provides Select Crisis Assessment Services     NURSING Referral Review  _________________________________________    This RN has reviewed this Medication Management referral to the Transition Clinic and deemed the referral   [x] Appropriate     [] Inappropriate   []Consulting     Based on the following criteria:    Pt has a psychiatric provider (or pending plan) in place for future prescribing: Yes:   Provider(s) Ofelia Bagley MD Psychiatry Resident.    Location [facility] [address] [place] [id number]   [phone]  Date/Time 3/8/2023 at 1:30pm in-person     Timeframe until pt's scheduled psychiatry appointment is less than 6 months: Yes: ~2 months     Pt takes psychiatric medications: Yes: sertraline (ZOLOFT) 100 MG tablet, busPIRone (BUSPAR) 10 MG tablet, gabapentin (NEURONTIN) 300 MG capsule, hydrOXYzine (ATARAX) 50 MG tablet, melatonin 5 MG tablet, traZODone (DESYREL) 50 MG tablet, OLANZapine (zyPREXA) tablet 10 mg.      Pt's goals seem to align with this temporary service: Yes: Transition Clinic to bridge psychiatric care and psychiatric medication management until next Level of Care.         Any additional pertinent information regarding this referral: Patient is currently Inpatient on station 10 at [facility]. SI with SA by tying a belt around her neck. THC use. Discharging today from Station 10. TC RN spoke with this patients inpatient  who would like the patient scheduled with the Transition Clinic 1/23/22 at 2:30 via video visit on [employer].            Initial contact w/ patient/parent: NA    Additional Scheduling Instructions for Transition Clinic Coordinator:     TC Coordinators:  This is a medication only Referral.        Please schedule this  patient with TC Provider Sunni Gonzalez on 1/23/232 at 2:30 video visit.first link choice is [employer] and second link choice is [phone] ([employer]).       RN Signature  Driss Looney RN on 1/3/2023 at 2:26 PM                  Alanis Moya LGSW  P Transition Clinic Rn Pool  Transition Clinic Referral   [place]     Please Check Type of Referral Requested:     __x__MEDICATION:  Referrals for Medication are ONLY accepted from the following areas (select): Inpatient Mental Health Unit                                       Suboxone and Opioid Management Referrals are automatically denied. TC Psychiatry cannot see patient without active medical insurance.       GUARDIAN: If your patient is not their own Guardian, please provide the following:     Guardian Name:   Guardian Contact Information (Phone & Email) :   Guardian Address:     FOSTER CARE PROVIDER: If your patient lives at a Licensed Foster Care, please provide the following:     Foster Provider:   Foster Provider Contact Information (Phone & Email):   Foster Provider Address:       Referring Provider Contact Name: PAULO Goodman; Phone Number: [phone]     Reason for Transition Clinic Referral: Appointment is >30 days from discharge from inpatient hospitalization.     Next Level of Care Patient Will Be Transitioned To: Psychiatry   Provider(s) Ofelia Bagley MD Psychiatry Resident.    Location [facility] [address] [place] [address]   [phone]  Date/Time 3/8/2023 at 1:30pm in-person         What Would Be Helpful from the Transition Clinic: Follow up with patient to ensure her medications are targeting her symptoms after psychiatric inpatient hospitalization and prior to establishing care with outpatient provider.      Needs: NO     Does Patient Have Access to Technology: Yes     Patient E-mail Address: [email or web address]     Current  Patient Phone Number: [phone]     Clinician Gender Preference (if applicable): NO     Patient location preference: In person     Alanis Lopez LGPARESH

## 2023-01-03 NOTE — PLAN OF CARE
Patient is alert  and oriented x4. Able to communicate needs. Patient is pleasant, calm, and cooperative. Patient denied pain. She is medication compliant. She denied SI/SIB/AVH/HI. Denied depression. Patient rated anxiety at 3 out of 10. Patient is discharging homethis afernoon. Se feels safe and ready to discharge. She contracts for safety in the hospital and outside hospital. She will discharge back home to her parents. Medications ordered via [facility] pharmacy in [facility]. She will discharge with all her personal belongings and a copy of her AVS.   1400. Writer went through AVS with patient, she verbalized understanding of the instructions but stated she was super anxious. She stated she felt more safe at home than being here. She denied SI/SIB. Writer educated patient the importance of taking her medications as prescribed and to seek help if symptoms get worse and she verbalized understanding.. Patient was discharged with all her belongings. Writer took patient off the unit and she was received by her father to transport home. She will  her medications from [facility] pharmacy in [place].

## 2023-01-03 NOTE — PROGRESS NOTES
PSYCHIATRY PROGRESS NOTE         DATE OF SERVICE:   1/2/2023         CHIEF COMPLAINT:     Depression, anxiety ,medication adjustment, wants me to discus her condition with her father           OBJECTIVE:     Nursing reports : Patient is going to groups, taking medications, visible on the unit     reports working on outpatient referrals         SUBJECTIVE:      Jossy denies suicidal and homicidal ideas, delusions and hallucinations. She says she can not tolerate Depakote. She says she had heart palpitations on it. She agrees to start Neurontin for anxiety and mood lability. She sleeps better, appetite fluctuates, energy decreased, concentration improving but   Below baseline. She perseverates on her inability to adjust to life in [place] after living 4 years in [place]. She says she does not  know how to function in this society. I provided supportive therapy with cognitive behavioral approach. She asked me to discus her condition with her father and I did in details. She will need appointments with psychiatrist and psychotherapist and  will make it. We discussed [facility]  Till January 30. She agrees with referral to [facility].  She si going to groups. Visible on the unit. No altercations with staff and patients.          MEDICATIONS:     Depakote  mg for mood stabilization,check level in 5 days   Buspar 10 mg tid for anxiety  Hydroxyzine 50 mg qid for anxiety   Melatonin 5 mg at bedtime for sleep  Trazodone  mg at bedtime prn sleep  Zoloft 100 mg qam for depression      Medication adherence: Yes  Medication side effects: No  Benefit: Symptom reduction         ROS:   As per history of present illness, otherwise reminder of review of systems is negative for: General, eyes, ears, nose, throat, neck, respiratory, cardiovascular, gastrointestinal, genitourinary, meniscal skeletal, neurological, hematological, dermatological and endocrine system.         MENTAL STATUS EXAM:   /80  "(BP Location: Left arm, Patient Position: Sitting, Cuff Size: Adult Regular)   Pulse 90   Temp 97.5  F (36.4  C) (Oral)   Resp 16   Ht 1.626 m (5' 4.02\")   Wt 76.7 kg (169 lb)   SpO2 99%   BMI 28.99 kg/m      Appearance:fair hygiene  Orientation:x3  Speech:fluent  Language ability: intact  Thought process: concrete  Thought content: devoid of delusions and hallucinations  Associations: Connected  Suicidal Ideation: denies   Homicidal Ideation: denies   Mood:  depressed  Affect: anxious   Intellectual functioning:average  Fund of Knowledge: average  Attention/Concentration: decreased  Memory: intact  Psychomotor Behavior: calm  Muscle Strength and Tone: no atrophy or involuntary movement  Gait and Station: steady  Insight and judgement:fair         LABS:      personally reviewed     Latest Reference Range & Units 12/29/22 13:53 12/29/22 14:53   HCG Qual Urine Negative    Negative   SARS CoV2 PCR Negative  Negative     Amphetamine Qual Urine Screen Negative    Screen Negative   Cocaine Qual Urine Screen Negative    Screen Negative   Benzodiazepine Qual Ur Screen Negative    Screen Negative   Opiates Qualitative Urine Screen Negative    Screen Negative   Cannabinoids Qual Urine Screen Negative    Screen Negative   Barbiturates Qual Urine Screen Negative               DIAGNOSIS:     Major depressive disorder recurrent severe with mixed features   Generalized anxiety disorder  Insomnia due to other mental disorder     Patient Active Problem List   Diagnosis     GURU (generalized anxiety disorder)     Major depressive disorder, recurrent episode, severe with mixed features (H)     Insomnia due to other mental disorder          PLAN:   Patient and I discussed  diagnosis and treatment plan and patient agrees with the following recommendations:    Medications:  Discontinue Depakote   Start Neurontin 300 mg tid for anxiety and mood stabilization   Zoloft 100 mg q am for depression  Buspar 10 mg tid for " anxiety  Hydroxyzine 50 mg qid for anxiety  Melatonin 5 mg at bedtime for sleep  Trazodone  mg at bedtime prn sleep  We discussed side effects, benefits and alternative treatments and patient agrees with capacity to do so.  Suicide precautions   Rule 25 for chemical dependency treatment not needed    will collect collateral information and make outpatient referrals  Staff to provide emotional support and redirect as needed  Patient encouraged to attend groups  Lab results: Reviewed personally  Consultation: According to patient symptom report    Risk Assessment: recent severe mental health decompensation     Coordination of Care:   Patient seen, medical record reviewed, care coordinated with the team.    Total time:  More than 50 minutes spent on this visit on visit with patient, coordination of care with staff, team meeting, reviewing medical record, educating patient about treatment options, side effects and benefits and alternative treatments for medications, providing supportive therapy regarding above issues, discussing her condition with her father, entering orders and preparing documentation for the visit.    This document is created with the help of Dragon dictation system.  All grammatical/typing errors or context distortion are unintentional and inherent to software.    Isidra Banda MD       Re-Certification I certify that the inpatient psychiatric facility services furnished since the previous certification were, and continue to be, medically necessary for, either, treatment which could reasonably be expected to improve the patient s condition or diagnostic study and that the hospital records indicate that the services furnished were, either, intensive treatment services, admission and related services necessary for diagnostic study, or equivalent services.     I certify that the patient continues to need, on a daily basis, active treatment furnished directly by or requiring the  supervision of inpatient psychiatric facility personnel.   I estimate TBD days of hospitalization is necessary for proper treatment of the patient. My plans for post-hospital care for this patient are : Medications, appointments     Isidra Banda MD

## 2023-01-03 NOTE — DISCHARGE SUMMARY
"  DISCHARGE SUMMARY    Jossy Matthews     YOB: 1995   Date of admission: 12/29/2022      Date of discharge: 1/3/2023   Date of service: 1/3/2023    Identifications:  This is a 27 year old   female with major depressive disorder and anxiety.  She was brought to the emergency room by her father after she attempted suicide with hanging on the door.  For details of history and physical on admission please refer to my     admission dictation.    Hospital Course:  Patient was admitted to psychiatric unit for safety, stabilization and medication management.  From H &P  I coordinated care by reviewing the ER in the inpatient unit.     \"According to ER Dr. Nikolas Vaughn's report from 12/29/2022, patient tied the belt of her bottle of around her neck on 12/28/2022 and she tied into the door as well.  She did not lose consciousness but she had petechiae was around her jaw and marks on her neck from it.  She reported that the anxiety was so terrible and she was not able to sleep the last month and she wanted to end her life because of that.  She says part of her did not want to die so she was not sure if it was a suicide attempt.  No previous suicide attempts.  She reported that she was on Zoloft for years and she was anxious about going to work in December so she thought that medication did not work.  Her PMD recommended to try Lexapro.  She said that she had side effects and anxiety was worse and she started having panic attacks.  She was not able to sleep in the last month.  She started taking Zoloft 50 mg 5 days ago and she increased to 200 mg daily.  She says that the dose before starting Lexapro was 200 mg daily.  She also has hypokalemia and she pulls her eyebrows here out.  She s reported that she saw herself as a failure to lounge and blowup.  She has supportive parents.  She reported difficulties with making decisions, anxiety and fearful about everything.  She reported racing thoughts.  She reported to the " nurse that she was waking up every morning feeling that she wanted to die.  She could not sleep through the night and she woke up every 1-2 hours.  According to the  Ashley Haines 's note from 12/29/2022 patient reported worsening of anxiety and racing thoughts and panic attacks in the last 3 months.  She reported that social anxiety started in high school and it responded well to Zoloft until recently.  She reported that she had decrease in anxiety, panic attacks and crying when she was supposed to go to work.  She started taking Lexapro in the beginning of December and she had excessive sweating and persistent suicidal thoughts, inability to sleep, racing thoughts, racing heart rate.  Lexapro was discontinued and she started Zoloft on December 15, 1950 milligrams daily with plan to gradually increase dose to 200 mg which was the maximum dose before she stopped taking Zoloft.  The dose was increased to 100 mg on December 29, 2022.  She reported intense suicidal thoughts about to end her life.  She reported that she was doing research on the way to end her life.  She reported that she felt overwhelmed with racing thoughts, fear, anxiety and panic so she tied bilateral belt to the closet and attempted to hang herself.  She had bruises on her neck.  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  She reported that she did not know how to take care of herself and she was dependent on her parents to help her.  Employment is stressful.  She works in college admissions.  She has anxiety, decreased concentration, fear inadequacy.  She uses marijuana occasionally.  She graduated from college and she lived in [place] for 4 years.  She came back to [place] in 2019 and she has been living with her parents.  She reported that she had anxiety when she was in [place] but she was able to manage it and that was the best part of her life.  She felt guilty and ashamed of being  unable to lounge.  She reported anxiety starting in high school.  She had individual psychotherapy all her life.  She does not have psychotherapist at this time, but she signed out for better health clinic.  A primary care prescribes her medications.  Emergency room  talked collateral information from patient's father.  He reported that patient's mental health has been deteriorating in the last 3 months.  She has had social anxiety since high school.  She has difficult time in employment, difficulties with adjustment, anxious, she told her father about last night suicide attempt and he picked her up from work and brought her to the emergency room.  He reported that he has better relationship with patient and that her relationship with her mother is somewhat conflicted.  During the interview with me she reports that she did not sleep well last night.  Staff reported that she slept about 1.25 hours last night.     During the interview with me pt says she did well on Zoloft x 10 years. She developed depression and anxiety around age 16 and 17. She reports that stress increases depression and anxiety. She says she lived in [place] x 4 years, while she was  to Chinese man. She says she went there to study Chinese. She did some jobs she found stressful. 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 She says that life was easier in [place] where she had free medical care, easy transportation, stores everywhere. She lived in [facility].She says she decided to divorce her  because they grew apart. She wanted divorce, and she came back to [place] in 2019. She says adjustment has been very difficult.   She says she is not able to function in dependently . She lives with her parents. She says her mother was lieutenant, and she behaves like that at home, and has control of everyone, and that cause friction between 2 of them. She  says that her mother pushes her to be more functional and that causes anxiety. She says that  She started new job in July and it is very stressful. 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 She says that she had difficulties adjusting ,and depression and anxiety were getting worse. She says that she asked her PMD for different medication, because she thought Zoloft did not work. She had severe anxiety, racing thoughts, problem with sleep. Her doctor ordered Lexapro on 12/2/22. She says depression and anxiety and insomnia got worse. She says she had panic atacs, she was waking up every one to two hours. She says she could not tolerate going to work . She had racing thoughts. She says that in July her boss used to tell her to slow down because she was too energetic.She says her symptoms got worse in October. She says her boss and people at work tried to help. She says that she started to have insomnia , she could not focus, had more racing thoughts.   She says that she started having suicidal thoughts and thinking about plan. She says she could not tolerate the level of depression, anxiety and insomnia, so she asked her PMD to puth her back on Zoloft. She says she was put back on Zoloft on 12/22, not 12/15, as it was mentioned in the ER notes. She says that yesterday was the first day that it was raised to 100 mg , with plan to put her back on 200 mg which was the maximum dose before switch to Lexapro. Her symptoms continued and she attempted  to kill self on 12/28/22.She says she tied bathrobe belt around her neck and in closet and attempted to hang self, but she stopped it because the pressure of a belt on her neck was painful, and she pulled back . She had marks on her neck. She minimizes her symptoms. She says she told her father about suicide, and he brought her to the hospital.We discussed diagnosis and tx plan. We discussed depression with mixed features and bipolar  "disorder in differential diagnosis. We discussed anxiety and insomnia. She had insomnia racing thoughts, increased energy, depression and mood lability. We discussed adding Buspar for anxiety and Depakote for mood stabilization. She is on birth control and she does not plan to get pregnant soon, so Depakote can be used. She had serious suicide attempt and she minimizes her symptoms and she tries to justify it. She is not safe for discharge . She is voluntary , but holdable. We discussed referral to psychiatrist and psychotherapist and she agrees. She will think about day tx.\"    She has gradually improved. She has responded to medications and unit milieu. She has tolerated medication changes well. She has related to staff and patients without problems. She has attended groups. She denies suicidal and homicidal ideas, delusions and hallucinations. She reports improved sleep and appetite , energy and concentration. She wanted discharge , but this morning she felt anxious about discharge. I ask her if she wants to stay longer , and she denies. She says that   She is anxious about functioning on her own. We discussed that she has support of her family. She will have TYRONE until 1/30/23. She will see OP providers. We discussed her care with her parents with her and their request. Her parents understand that putting pressure on her to function better does not help, and she needs time to recover from depression. They understand and they will provide all care she needs. She was referred to [facility] and they will call her.  made OP appointments  with psychiatrist and psychotherapist. She will watch her diet, en=xercisem use pregnancy protection. She will talk to her parents if her symptoms get worse and she would come to the hospital if she ware  unable to function or if she had suicidal thoughts .    She agrees  with outpatient treatment recommendations.    Patient progress toward goals:   Improved and safe for " "discharge.    Vital Signs:   /80 (BP Location: Left arm, Patient Position: Sitting, Cuff Size: Adult Regular)   Pulse 90   Temp 97.5  F (36.4  C) (Oral)   Resp 16   Ht 1.626 m (5' 4.02\")   Wt 76.7 kg (169 lb)   SpO2 99%   BMI 28.99 kg/m       Mental status examination on discharge day:  General:adequate hygiene, cooperative  Orientation: to self, place and time  Speech:normal in rate and tone  Language:intact  Thought process:ruminative   Thought content: denies  delusions and hallucinations  Suicidal thoughts:denies   Homicidal thoughts:denies   Associations:connected, no loosening or tanhentiality  Affect:anxious   Mood:improved depression and anxiety  Attention and concentration:improved  Memory:intact  Fund of knowledge:consistent with education and experience   Intellectual functioning:average  Gait:steady  Psychomotor activity:calm, no agitation  Muscle strength and tone:no involntary movements  Insight and judgement:fair    Review of Systems:  As per history of present illness, otherwise reminder of   review of of systems is negative for: General, eyes, ears, nose, throat, neck, respiratory, cardiovascular, gastrointestinal, genitourinary, musculoskeletal, neurological, hematological, dermatological and endocrine system.    Laboratory results: Personally reviewed         Latest Reference Range & Units 12/29/22 13:53 12/29/22 14:53   HCG Qual Urine Negative    Negative   SARS CoV2 PCR Negative  Negative     Amphetamine Qual Urine Screen Negative    Screen Negative   Cocaine Qual Urine Screen Negative    Screen Negative   Benzodiazepine Qual Ur Screen Negative    Screen Negative   Opiates Qualitative Urine Screen Negative    Screen Negative   Cannabinoids Qual Urine Screen Negative    Screen Negative   Barbiturates Qual Urine Screen Negative        DISCHARGE DIAGNOSIS    Major depressive disorder recurrent severe with mixed features stabilized   Generalized anxiety disorder   Insomnia due to other " mental disorder   S/P suicide attempt     Patient Active Problem List   Diagnosis     GURU (generalized anxiety disorder)     Major depressive disorder, recurrent episode, severe with mixed features (H)     Insomnia due to other mental disorder       DISCHARGE PLAN    Patient will be discharged home with her parents.   Patient will take medications as prescribed. Patient will not adjust or stop taking medications without talking to providers.Emphasized importance of compliance with treatment for optimal response.  Pregnancy protection    made outpatient appointments.  Patient will call providers with any problems between 2 visits. Emphasized importance of communication with providers.  Patient will go to the emergency room if not feeling safe, not able to function in the community,or if suicidal, homicidal or psychotic.  Patient will see his non psychiatric providers per their recommendation.  Patient will watch diet and exercise as tolerated.   Patient will abstain from drugs and alcohol.  Patient will not drive or operate heavy machinery, if sedated on medications or under influence of any substance.  We discussed diagnosis, prognosis, differential diagnosis and side effects and benefits of medications and alternative treatments and patient agrees with capacity to do so.  FMLA from 12/30/22 to 1/30/23 , then 4 hours per day x 4 weeks, unless OP provider change it.   Referred to PHP and they will call her     Discharge Medications:       Review of your medicines      START taking      Dose / Directions   busPIRone 10 MG tablet  Commonly known as: BUSPAR      Dose: 10 mg  Take 1 tablet (10 mg) by mouth 3 times daily  Quantity: 90 tablet  Refills: 0     gabapentin 300 MG capsule  Commonly known as: NEURONTIN      Dose: 300 mg  Take 1 capsule (300 mg) by mouth 3 times daily  Quantity: 90 capsule  Refills: 0     hydrOXYzine 50 MG tablet  Commonly known as: ATARAX      Dose: 50 mg  Take 1 tablet (50 mg) by  mouth every 4 hours as needed for anxiety  Quantity: 30 tablet  Refills: 0     melatonin 5 MG tablet      Dose: 5 mg  Take 1 tablet (5 mg) by mouth nightly as needed for sleep  Quantity: 30 tablet  Refills: 0     traZODone 50 MG tablet  Commonly known as: DESYREL      Dose:  mg  Take 1-2 tablets ( mg) by mouth nightly as needed for sleep  Quantity: 60 tablet  Refills: 0        CONTINUE these medicines which have NOT CHANGED      Dose / Directions   Excedrin Extra Strength 250-250-65 MG tablet  Generic drug: aspirin-acetaminophen-caffeine      Dose: 1 tablet  Take 1 tablet by mouth daily as needed for headaches  Refills: 0     multivitamin w/minerals tablet      Dose: 1 tablet  Take 1 tablet by mouth daily  Refills: 0     norethindrone-ethinyl estradiol 1-20 MG-MCG tablet  Commonly known as: JUNEL FE 1/20      Dose: 1 tablet  Take 1 tablet by mouth daily  Refills: 0     sertraline 100 MG tablet  Commonly known as: ZOLOFT      Dose: 100 mg  Take 1 tablet (100 mg) by mouth daily  Quantity: 30 tablet  Refills: 0           Where to get your medicines      These medications were sent to [facility]/pharmacy #[id number] - [place], [place] - [address] [place] [address] AT [facility]. [facility] & [address] [address]    Phone: [phone]     busPIRone 10 MG tablet    gabapentin 300 MG capsule    hydrOXYzine 50 MG tablet    melatonin 5 MG tablet    sertraline 100 MG tablet    traZODone 50 MG tablet           Diet: regular    Exercise: activity as tolerated    Condition at Discharge: stable    Coordination of Care:  Patient seen, chart reviewed, care coordinated with the team.    Time spent:  55 minutes spent on this discharge on visit with patient,  coordination of care with staff on the unit,team meeting, care conference with pt and her parents, reviewing medical record, educating patient about diagnosis, differential diagnosis, prognosis, side effects and benefits of medications and alternative  treatment.Educating patient about diet, exercise, abstinence from drugs and alcohol.Providing supportive therapy about above issues, entering orders and preparing documentation for discharge.    This note was created with the help of Dragon dictation system.  All grammatical/typing errors or context distortion are unintentional and inherent to software.    Isidra Banda MD      1/3/2023  12:32 PM

## 2023-01-03 NOTE — PLAN OF CARE
Problem: Sleep Disturbance  Goal: Adequate Sleep/Rest  Outcome: Progressing   Goal Outcome Evaluation:         Pt had an uneventful night. No signs of pain or discomfort noted during 15 minutes safety checks. Pt appeared sleeping comfortable with no signs distress noted. Pt had 7 hours of sleep. No concern or outburst behavior noted this shift. Will continue to monitor and will assist if need arise.

## 2023-01-03 NOTE — TELEPHONE ENCOUNTER
Writer scheduled appt for 1.23.23 at 230 per  and RN. Added in Epic book it. Referral source notified of confirmed appt and added in tracker.    Yeny Carlos  Care Coordinator  1.3.    ----- Message from Driss Looney RN sent at 1/3/2023  2:31 PM CST -----   Mental Health &Addiction (MH&A)Transition Clinic (TC):     Provides Patient Support While Waiting to Access Programmatic and Outpatient MH&A Care and Provides Select Crisis Assessment Services     NURSING Referral Review  _________________________________________    This RN has reviewed this Medication Management referral to the Transition Clinic and deemed the referral    Appropriate   X   Inappropriate   Consulting     Based on the following criteria:    Pt has a psychiatric provider (or pending plan) in place for future prescribing: Yes:   Provider(s) Ofelia Bagley MD Psychiatry Resident.    Location [facility] [address] [place] [id number]   [phone]  Date/Time 3/8/2023 at 1:30pm in-person     Timeframe until pt's scheduled psychiatry appointment is less than 6 months: Yes: ~2 months     Pt takes psychiatric medications: Yes: sertraline (ZOLOFT) 100 MG tablet, busPIRone (BUSPAR) 10 MG tablet, gabapentin (NEURONTIN) 300 MG capsule, hydrOXYzine (ATARAX) 50 MG tablet, melatonin 5 MG tablet, traZODone (DESYREL) 50 MG tablet, OLANZapine (zyPREXA) tablet 10 mg.      Pt's goals seem to align with this temporary service: Yes: Transition Clinic to bridge psychiatric care and psychiatric medication management until next Level of Care.         Any additional pertinent information regarding this referral: Patient is currently Inpatient on station 10 at [facility]. SI with SA by tying a belt around her neck. THC use. Discharging today from Station 10. TC RN spoke with this patients inpatient  who would like the patient  scheduled with the Transition Clinic 1/23/22 at 2:30 via video visit on [employer]ed[employer].            Initial contact w/ patient/parent: NA    Additional Scheduling Instructions for Transition Clinic Coordinator:     TC Coordinators:  This is a medication only Referral.        Please schedule this patient with TC Provider Sunni Gonzalez on 1/23/232 at 2:30 video visit.first link choice is EmbedStore and second link choice is [phone] (Mobile).       RN Signature  Driss Looney RN on 1/3/2023 at 2:26 PM  ----- Message -----  From: Alanis Moya LGSW  Sent: 1/3/2023  12:21 PM CST  To: Transition Clinic Rn Pool    Transition Clinic Referral   [place]     Please Check Type of Referral Requested:     __x__MEDICATION:  Referrals for Medication are ONLY accepted from the following areas (select): Inpatient Mental Health Unit                                       Suboxone and Opioid Management Referrals are automatically denied. TC Psychiatry cannot see patient without active medical insurance.       GUARDIAN: If your patient is not their own Guardian, please provide the following:    Guardian Name:  Guardian Contact Information (Phone & Email) :  Guardian Address:     FOSTER CARE PROVIDER: If your patient lives at a Licensed Foster Care, please provide the following:    Foster Provider:  Foster Provider Contact Information (Phone & Email):  Foster Provider Address:       Referring Provider Contact Name: PAULO Goodman; Phone Number: [phone]    Reason for Transition Clinic Referral: Appointment is >30 days from discharge from inpatient hospitalization.     Next Level of Care Patient Will Be Transitioned To: Psychiatry   Provider(s) Ofelia Bagley MD  [facility] [address] [place] [address] [address] [address]  Date/Time 3/8/2023 at 1:30pm in-person       What Would Be Helpful from the Transition Clinic: Follow up with patient to ensure her medications are  targeting her symptoms after psychiatric inpatient hospitalization and prior to establishing care with outpatient provider.      Needs: NO    Does Patient Have Access to Technology: Yes    Patient E-mail Address: [email or web address]    Current Patient Phone Number: [phone]    Clinician Gender Preference (if applicable): NO    Patient location preference: In person    PAULO Mead

## 2023-01-03 NOTE — PLAN OF CARE
Assessment/Intervention/Current Symtoms and Care Coordination:  -Refer to psychosocial completed on 12/30/22 for assessment/social functioning  -Chart review  -Team meeting - pt's dad visited over the weekend. Pt has been social with peers. Pt will complete assessment for PHP/IOP today. She will discharge around 2pm. Provider is agreeable to completing FMLA paperwork and asked RN to have pt sign MOLLY.   -Writer called [facility] to schedule therapy and psychiatry, but was unable to schedule as they do not accept pt's insurance.   -Writer called [facility] to schedule psychiatry. Appointment is scheduled for 3/8/2023. Writer made referral for Transition Clinic and called to follow up on status of this referral to see if we could schedule an appointment, but was told they need a nurse to review the referral and that they'll call pt to schedule.   -Writer added psych appointment, Transition Clinic information, and PHP information to pt's AVS (including instructions for signing up for MyChart).   -AVS is complete and ready to be printed by RN.  Current Symptoms include the following: stable for discharge/at baseline  Precautions: SI    Discharge Plan or Goal:  Return home with outpatient providers (referred to IOP)    Barriers to Discharge:  Patient is discharging today.     Referral Status:  Patient was referred to IOP by provider and is completing assessment today 1/3.     Legal Status:  Patient is voluntary    Contacts:    Dad - Hever Matthews (phone: [phone])    Upcoming Meetings/Important Dates:  N/A    Rationale for SIO/No Roommate Order:  Patient is not on SIO.  Patient has current roommate.

## 2023-01-03 NOTE — PROGRESS NOTES
"    [facility]      PATIENT'S NAME: Jossy Matthews  PREFERRED NAME: Jossy  PRONOUNS:   she her     MRN: [id number]  : 1995  ADDRESS: [address]  [facility] [address]  [facility]. NUMBER:  [id number]  DATE OF SERVICE: 22  START TIME: 0900  END TIME: 1045  PREFERRED PHONE: [phone]  May we leave a program related message: Yes  SERVICE MODALITY:  Phone Visit:      Provider verified identity through the following two step process.  Patient provided:  Patient  and Patient address    The patient has been notified of the following:      \"We have found that certain health care needs can be provided without the need for a face to face visit.  This service lets us provide the care you need with a phone conversation.       I will have full access to your [facility] medical record during this entire phone call.   I will be taking notes for your medical record.      Since this is like an office visit, we will bill your insurance company for this service.       There are potential benefits and risks of telephone visits (e.g. limits to patient confidentiality) that differ from in-person visits.?Confidentiality still applies for telephone services, and nobody will record the visit.  It is important to be in a quiet, private space that is free of distractions (including cell phone or other devices) during the visit.??      If during the course of the call I believe a telephone visit is not appropriate, you will not be charged for this service\"     Consent has been obtained for this service by care team member: Yes     UNIVERSAL ADULT Mental Health DIAGNOSTIC ASSESSMENT    Identifying Information:  Patient is a 27 year old,    individual.  Patient was referred for an assessment by [address] .  Patient attended the session alone while on the unit.  The plan is that pt will discharge today to home.    Chief Complaint:   The reason for " seeking services at this time is: Depression, anxiety, insomnia, suicide attempt prior to this admission  The problem(s) began to worsen three months ago. She has a hx of major depressive disorder and anxiety.  She was brought to the emergency room by her father.      Per ED and H&P note:  ER  report from 12/29/2022, patient tied the belt  around her neck on 12/28/2022 and she tied into the door as well.  She did not lose consciousness but she had petechiae was around her jaw and marks on her neck from it.  She reported that the anxiety was so terrible and she was not able to sleep the last month and she wanted to end her life because of that.  She says part of her did not want to die so she was not sure if it was a suicide attempt.  No previous suicide attempts.  She reported that she was on Zoloft for years and she was anxious about going to work in December so she thought that medication did not work.  Her PMD recommended to try Lexapro.  She said that she had side effects and anxiety was worse and she started having panic attacks.  She was not able to sleep in the last month.  She started taking Zoloft 50 mg 5 days ago and she increased to 200 mg daily.  She says that the dose before starting Lexapro was 200 mg daily.  She also has hypokalemia and she pulls her eyebrow hair out.  She s reported that she saw herself as a failure to launch and grow up..  She has supportive parents.  She reported difficulties with making decisions, anxiety and fearful about everything.  She reported racing thoughts.    Patient has attempted to resolve these concerns in the past through medications. She had individual psychotherapy 'all her life'.  She does not have a psychotherapist at this time, but she signed up for [facility] clinic.  A primary care MD prescribes her medications.    Social/Family History:  Patient reported they grew up in [place].  They were raised by parents. Also a younger brother age 25.  Parents are  "still . Patient reported that their childhood was \"I had a great childhood, no issues\".   Patient described their current relationships with family of origin as \"I get along with my family pretty well, some times my mom and I clash but that is about it\".  Pt is single with no children.      The patient describes their cultural [place] as [place]   Cultural influences and impact on patient's life structure, values, norms, and healthcare: none.  Contextual influences on patient's health include: Contextual Factors: Individual Factors does not like her job and is having trouble emancipating.   These factors will be addressed in the Preliminary Treatment plan. Patient identified their preferred language to be English.  Patient reported they do not need the assistance of an  or other support involved in therapy.     Patient reported had no significant delays in developmental tasks.   Patient's highest education level was associates degree and she works full time.  Patient identified the following learning problems: none reported.  Modifications will not be used to assist communication in therapy.  Patient reports they are  able to understand written materials.    Patient reported the following relationship history:  Patient's current relationship status is  single.  Last relationship age 20-21.    Patient identified their sexual orientation as heterosexual.  Patient reported having  0 child(dona). Patient identified parents and brother as part of their support system.  Patient identified the quality of these relationships as good.     Patient's current living/housing situation involves mom, dad and pt and they report that housing is stable.    Patient is currently employed.  Patient reports their finances are obtained through work and living with her parents where she reportedly has no expenses.   Patient does not identify finances as a current stressor.      Patient reported that " they have not been involved with the legal system.     Patien do not report being under probation/ parole/ jurisdiction.    Patient's Strengths and Limitations:  Patient identified the following strengths or resources that will help them succeed in treatment: family support, insight and intelligence. Things that may interfere with the patient's success in treatment include: none identified.     Assessments:  The following assessments were completed by patient for this visit:  PHQ9:   PHQ-9 SCORE 1/3/2023   PHQ-9 Total Score 16     GAD7:   GURU-7 SCORE 1/3/2023   Total Score 14     CAGE-AID:   CAGE-AID Total Score 1/3/2023   Total Score 0     PROMIS 10-Global Health (all questions and answers displayed):   PROMIS 10 1/3/2023   In general, would you say your health is: 3   In general, would you say your quality of life is: 4   In general, how would you rate your physical health? 3   In general, how would you rate your mental health, including your mood and your ability to think? 2   In general, how would you rate your satisfaction with your social activities and relationships? 3   In general, please rate how well you carry out your usual social activities and roles. (This includes activities at home, at work and in your community, and responsibilities as a parent, child, spouse, employee, friend, etc.) 2   To what extent are you able to carry out your everyday physical activities such as walking, climbing stairs, carrying groceries, or moving a chair? 5   In the past 7 days, how often have you been bothered by emotional problems such as feeling anxious, depressed, or irritable? 4   In the past 7 days, how would you rate your fatigue on average? 2   In the past 7 days, how would you rate your pain on average, where 0 means no pain, and 10 means worst imaginable pain? 2   Global Mental Health Score 11   Global Physical Health Score 16   PROMIS TOTAL - SUBSCORES 27   Some recent data might be hidden     [place] Suicide  Severity Rating Scale (Lifetime/Recent)  [place] Suicide Severity Rating (Lifetime/Recent) 12/29/2022 12/29/2022 12/29/2022 12/30/2022 1/3/2023   Wish to be Dead (Lifetime) - - - Yes -   Q1 Wished to be Dead (Past Month) yes - yes yes yes   Q2 Suicidal Thoughts (Past Month) yes - yes yes yes   Q3 Suicidal Thought Method yes - yes yes yes   Q4 Suicidal Intent without Specific Plan no - no yes no   Q5 Suicide Intent with Specific Plan yes - yes yes no   Q6 Suicide Behavior (Lifetime) yes - - yes yes   Within the Past 3 Months? yes - - - -   Level of Risk per Screen high risk - high risk high risk moderate risk   1. Wish to be Dead (Lifetime) - 1 - - -   1. Wish to be Dead (Past 1 Month) - 1 - - -   2. Non-Specific Active Suicidal Thoughts (Lifetime) - 1 - - -   2. Non-Specific Active Suicidal Thoughts (Past 1 Month) - 1 - - -   3. Active Suicidal Ideation with any Methods (Not Plan) Without Intent to Act (Lifetime) - 0 - - -   3. Active Suicidal Ideation with any Methods (Not Plan) Without Intent to Act (Past 1 Month) - 1 - - -   4. Active Suicidal Ideation with Some Intent to Act, Without Specific Plan (Lifetime) - 0 - - -   4. Active Suicidal Ideation with Some Intent to Act, Without Specific Plan (Past 1 Month) - 1 - - -   5. Active Suicidal Ideation with Specific Plan and Intent (Lifetime) - 0 - - -   5. Active Suicidal Ideation with Specific Plan and Intent (Past 1 Month) - 1 - - -   Active Suicidal Ideation with Specific Plan and Intent Description (Past 1 Month) - hanging, - - -   Most Severe Ideation Rating (Lifetime) - 2 - - -   Most Severe Ideation Rating (Past 1 Month) - 5 - - -   Description of Most Severe Ideation (Past 1 Month) - hanging,wanting to die - - -   Frequency (Lifetime) - 1 - - -   Frequency (Past 1 Month) - 5 - - -   Duration (Lifetime) - 1 - - -   Duration (Past 1 Month) - 4 - - -   Controllability (Lifetime) - 1 - - -   Controllability (Past 1 Month) - 5 - - -   Deterrents (Lifetime) - 0 -  - -   Deterrents (Past 1 Month) - 5 - - -   Reasons for Ideation (Lifetime) - 5 - - -   Reasons for Ideation (Past 1 Month) - 5 - - -   Actual Attempt (Lifetime) - 1 - - -   Total Number of Actual Attempts (Lifetime) - 1 - - -   Actual Attempt Description (Lifetime) - attempted to hang self - - -   Actual Attempt (Past 3 Months) - 1 - - -   Total Number of Actual Attempts (Past 3 Months) - 1 - - -   Actual Attempt Description (Past 3 Months) - see above - - -   Has subject engaged in non-suicidal self-injurious behavior? (Lifetime) - 0 - - -   Interrupted Attempts (Lifetime) - 0 - - -   Aborted or Self-Interrupted Attempt (Lifetime) - 0 - - -   Preparatory Acts or Behavior (Lifetime) - 0 - - -   Most Recent Attempt Date - 66471 - - -   Actual Lethality/Medical Damage Code (Most Recent Attempt) - 1 - - -   Potential Lethality Code (Most Recent Attempt) - 2 - - -   Most Lethal Attempt Date - 66471 - - -   Actual Lethality/Medical Damage Code (Most Lethal Attempt) - 1 - - -   Potential Lethality Code (Most Lethal Attempt) - 2 - - -   Initial/First Attempt Date - 66471 - - -   Actual Lethality/Medical Damage Code (Initial/First Attempt) - 1 - - -   Potential Lethality Code (Initial/First Attempt) - 2 - - -   Calculated C-SSRS Risk Score (Lifetime/Recent) - High Risk - - -       Personal and Family Medical History:  Patient does not report a family history of mental health concerns.  Patient reports family history is not on file.  She is uncertain of any family hx of mental health    Patient does report Mental Health Diagnosis and/or Treatment.  Patient  reported the following previous diagnoses which include(s): anxiety and depression..  Patient has received mental health services in the past: Psychiatric Hospitalizations: one, when current.   Patient denies a history of civil commitment.  Currently, patient is on the unit but has no OP care in place other than PCP prescribing meds. receiving other mental health  services.      Patient has had a physical exam to rule out medical causes for current symptoms.  Date of last physical exam was within the past year. Client was encouraged to follow up with PCP if symptoms were to develop. The patient has a [place] Primary Care Provider.  Patient reports no current medical concerns.  Patient denies any issues with pain.  There are not significant appetite / nutritional concerns / weight changes.   Patient does not report a history of head injury / trauma / cognitive impairment.      pt is currently on the unit.  she was prescribed new medications.  list appears to include buspar, gabapentin, hydroxizine and zoloft    Medication Adherence:  Patient reports  taking prescribed medications as prescribed.    Patient Allergies:  No Known Allergies    Medical History:    Past Medical History:   Diagnosis Date     Anxiety      Depressive disorder          Current Mental Status Exam:   Appearance:  unable to assess as interviewed on the unit by phone   Eye Contact:  na  Psychomotor:  unable to assess       Gait / station:  Unable to assess  Attitude / Demeanor: Cooperative  Pleasant  Speech      Rate / Production: Normal/ Responsive      Volume:  Normal  volume      Language:  intact  Mood:   Depressed and anxiety per pt  Affect:   Appropriate    Thought Content: Clear   Thought Process: Goal Directed  Logical       Associations: No loosening of associations  Insight:   Good   Judgment:  Intact   Orientation:  All  Attention/concentration: Good      Substance Use:  Patient did not report a family history of substance use concerns; see medical history section for details.  Patient has not received chemical dependency treatment in the past.  Patient has not ever been to detox.      Patient is not currently receiving any chemical dependency treatment. Patient reported the following problems as a result of their substance use:  none.    Patient denies using alcohol.  Patient denies using  tobacco.  Patient reports using cannabis 'every once in a while' with no consistency and last use was at [place] time.   Patient reports using/abusing the following substance(s). Patient reported no other substance use.     Substance Use: No symptoms    Based on the negative CAGE score and clinical interview there  are not indications of drug or alcohol abuse.      Significant Losses / Trauma / Abuse / Neglect Issues:   Patient did not serve in the .  There are indications or report of significant loss, trauma, abuse or neglect issues related to: are no indications and client denies any losses, trauma, abuse, or neglect concerns.  Concerns for possible neglect are not present.     Safety Assessment:   Patient denies current homicidal ideation and behaviors.  Patient denies current self-injurious ideation and behaviors.    Patient denied risk behaviors associated with substance use.  Patient denies any high risk behaviors associated with mental health symptoms.  Patient reports the following current concerns for their personal safety: None.  Patient reports there are firearms in the house.  The firearms are secured in a locked space.    History of Safety Concerns:  Patient denied a history of homicidal ideation.     Patient denied a history of personal safety concerns.    Patient denied a history of assaultive behaviors.    Patient denied a history of sexual assault behaviors.     Patient denied a history of risk behaviors associated with substance use.  Patient denies any history of high risk behaviors associated with mental health symptoms.  Patient reports the following protective factors:      Risk Plan:  See Recommendations for Safety and Risk Management Plan    Review of Symptoms per patient report:   Depression: Change in sleep, Lack of interest, Excessive or inappropriate guilt, Change in energy level, Difficulties concentrating, Change in appetite, Psychomotor slowing or agitation, Suicidal  ideation, Feelings of hopelessness, Feelings of helplessness, Low self-worth, Ruminations and Feeling sad, down, or depressed  Judie:  No Symptoms  Psychosis: No Symptoms  Anxiety: Excessive worry, Nervousness, Social anxiety, Psychomotor agitation, Ruminations and Poor concentration  Panic:  recalls panic attack last summer when she learned of her father's health issue  Post Traumatic Stress Disorder:  No Symptoms   Eating Disorder: No Symptoms  ADD / ADHD:  No symptoms  Conduct Disorder: No symptoms  Autism Spectrum Disorder: No symptoms  Obsessive Compulsive DisorderNo Symptoms    Patient reports the following compulsive behaviors and treatment history: Hair Pulling - has not had treatment..   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 Excessive anxiety and worry about a number of events or activities (such as work or school performance).   B. The person finds it difficult to control the worry.  C. Select 3 or more symptoms (required for diagnosis). Only one item is required in children.   - Restlessness or feeling keyed up or on edge.    - Being easily fatigued.    - Difficulty concentrating or mind going blank.    - Sleep disturbance (difficulty falling or staying asleep, or restless unsatisfying sleep).   D. The focus of the anxiety and worry is not confined to features of an Axis I disorder.  E. The anxiety, worry, or physical symptoms cause clinically significant distress or impairment in social, occupational, or other important areas of functioning.   F. The disturbance is not due to the direct physiological effects of a substance (e.g., a drug of abuse, a medication) or a general medical condition (e.g., hyperthyroidism) and does not occur exclusively during a Mood Disorder, a Psychotic Disorder, or a Pervasive Developmental Disorder. Major Depressive Disorder  CRITERIA (A-C) REPRESENT A MAJOR DEPRESSIVE  EPISODE - SELECT THESE CRITERIA  A) Single episode - symptoms have been present during the same 2-week period and represent a change from previous functioning 5 or more symptoms (required for diagnosis)   - Depressed mood. Note: In children and adolescents, can be irritable mood.     - Diminished interest or pleasure in all, or almost all, activities.    - Psychomotor activity agitation.    - Fatigue or loss of energy.    - Feelings of worthlessness or inappropriate and excessive guilt.    - Diminished ability to think or concentrate, or indecisiveness.    - Recurrent thoughts of death (not just fear of dying), recurrent suicidal ideation without a specific plan, or a suicide attempt or a specific plan for committing suicide.   B) The symptoms cause clinically significant distress or impairment in social, occupational, or other important areas of functioning  C) The episode is not attributable to the physiological effects of a substance or to another medical condition  D) The occurence of major depressive episode is not better explained by other thought / psychotic disorders  E) There has never been a manic episode or hypomanic episode    Functional Status:  Patient reports the following functional impairments:  health maintenance, self-care, social interactions and work / vocational responsibilities.         Clinical Summary:  1. Reason for assessment: pt is inpatient due to a suicide attempt and worsening depression and anxiety.    .  2. Psychosocial, Cultural and Contextual Factors: father diagnosed with lung cancer this past summer.  He had a lung removed.    3. Principal DSM5 Diagnoses  (Sustained by DSM5 Criteria Listed Above):   296.33 (F33.2) Major Depressive Disorder, Recurrent Episode, Severe _ and With anxious distress  300.02 (F41.1) Generalized Anxiety Disorder.  4. Other Diagnoses that is relevant to services:  None  5. Provisional Diagnosis: none  6. Prognosis: Return to Baseline Functioning.  7.  Likely consequences of symptoms if not treated: ongoing anxiety and worsening mood.  Hx of SI .  8. Client strengths include:  educated, employed, has a previous history of therapy, insightful, intelligent, motivated, support of family, friends and providers and willing to relate to others .     Recommendations:     1. Plan for Safety and Risk Management:   Safety and Risk: A safety and risk management plan has been developed.      Safety Plan:  Adult Short Safety Plan:   Name: Jossy Matthews  YOB: 1995  Date: January 3, 2023   My primary care provider: Primary Care Provider: Silvano Vizcain[facility]      My Triggers:    I think it was a medication change, I didn't sleep well for a whole month, I don't like my job and I was stressing out about everything at once. Additional People, Places, and Things that I can access for support: parents and brother         What is important to me and makes life worth living: My family is very important to me.  [employer], helping people .         GREEN    Good Control  1. I feel good  2. No suicidal thoughts   3. Can work, sleep and play      Action Steps  1. Self-care: balanced meals, exercising, sleep practices, etc.  2. Take your medications as prescribed.  3. Continue meetings with therapist and prescriber.  4.  Do the healthy things that I enjoy.             YELLOW  Getting Worse  I have ANY of these:  1. I do not feel good  2. Difficulty Concentrating  3. Sleep is changing  4. Increase/Change in my thoughts to hurt self and/or others, but I can still manage and not act on it.   5. Not taking care of self.               Action Steps (in addition to the above):  1. Inform your therapist and psychiatric prescriber/PCP.  2. Keep taking your medications as prescribed.    3. Turn to people you can ask for help.  4. Use internal coping strategies -see below.  5. Create safe environment: notify friends/family of increase in symptoms and reduce means to  other identified method             RED  Get Help  If I have ANY of these:  1. Current and uncontrollable thoughts and/or behaviors to hurt self and/or others.      Actions to manage my safety  1. Contact your emergency person jose roberto Matthews (Father)   [phone]  2. Call or Text [phone]  3. Call my crisis team- [facility] [phone]  3. Or Call 911 or go to the emergency room right away          My Internal Coping Strategies include the following:  You will work on these in the partial hospitalization program.    Safety Concerns  How To Identify Situations That Make Your Mental Health Worse:  Triggers are things that make your mental health worse.  Look at the list below to help you find your triggers and what you can do about them.     1. Identify Early Warning Signs:    Sometimes symptoms return, even when people do their best to stay well. Symptoms can develop over a short period of time with little or no warning, but most of the time they emerge gradually over several weeks.  Early warning signs are changes that people experience when a relapse is starting. Some early warning signs are common and others are not as common.   Common Early Warning Signs:    Feeling tense or nervous, Eating less or eating more, Trouble sleeping -either too much or too little sleep, Feeling depressed or low, Feeling irritable, Feeling like not being around other people, Trouble concentrating, Urges to harm self and Urges to use drugs or alcohol     2. Identify action steps to take when warning signs are noticed:    Taking Action- It is important to take action if you are experiencing early warning signs of a relapse.  The faster you act, the more likely it is that you can avoid a full relapse.  It is helpful to identify several specific ways to cope with symptoms.      The following is my list of symptoms and coping strategies that I can use when they are present:    Symptom Coping Strategies   Anxiety -Talk with someone in  "your support system and let him or her know how you are feeling.  -Use relaxation techniques such as deep breathing or imagery.  -Use positive affirmations to counteract negative self-talk such as  I am learning to let go of worry.    Depression - Schedule your day; include activities you have to do and activities you enjoy doing.  - Get some exercise - walk, run, bike, or swim.  - Give yourself credit for even the smallest things you get done.   Sleep Difficulties   - Go to sleep at the same time every day.  - Do something relaxing before bed, such as drinking herbal tea or listening to music.  - Avoid having discussions about upsetting topics before going to bed.   Concentration Difficulties - Minimize distractions so there is only one thing for you to focus on at a time.    - Ask the person you are having a conversation with to slow down or repeat things you are unsure of.             .  Patient consented to co-developed safety plan.  Safety and risk management plan was completed.  Patient agreed to use safety plan should any safety concerns arise.  A copy was given to the patient..          Report to child / adult protection services was NA.     2. Patient's identified no cultural issues identified.     3. Initial Treatment will focus on:    Depression, anxiety and \"I don't feel like I know how to be an adult\"    4. Resources/Service Plan:    services are not indicated.   Modifications to assist communication are not indicated.   Additional disability accommodations are not indicated.      5. Collaboration:   Collaboration / coordination of treatment will be initiated with the following  support professionals: to be determined as no discharge plan at the time of this assessment.      6.  Referrals:   The following referral(s) will be initiated: Partial Hospitalization Program. Next Scheduled Appointment: 1/5/23, pt was offered 1/4/23 but pt wanted to wait a day.      A Release of Information has been " obtained for the following: no MOLLY at the time of this assessment.     Emergency Contact jose roberto Matthews (Father)  [phone] was obtained.      Clinical Substantiation/medical necessity for the above recommendations:  Pt is a 27 year old female who was admitted due to SI with plan and attempt and hx of depression and anxiety.  First admission to acute care.  Pt is stepping down from acute care and continues to feel need for increased LOC at recommendation of inpatient psychiatrist. Pt needs further stabilization.      7. GERMAN:    GERMAN:  Discussed the general effects of drugs and alcohol on health and well-being.    8. Records:   These were reviewed at time of assessment.   Information in this assessment was obtained from the medical record and provided by patient who is a good historian.  Patient will have open access to their mental health medical record.    9.   Interactive Complexity: No      Provider Name/ Credentials:  Carrie Holloway MA LP    January 3, 2023

## 2023-01-03 NOTE — PLAN OF CARE
Goal Outcome Evaluation:    Plan of Care Reviewed With: patient      Problem: Suicide Risk  Goal: Absence of Self-Harm  Outcome: Progressing    Pt had a good evening.  She denied all psych symptoms and pain.  Her dad was here to visit.  Oral intake was good.  Pt played cards and socialized appropriately with others.  Pt was started on gabapentin today and med education was done.

## 2023-01-04 ENCOUNTER — TELEPHONE (OUTPATIENT)
Dept: BEHAVIORAL HEALTH | Facility: CLINIC | Age: 28
End: 2023-01-04

## 2023-01-04 NOTE — PROGRESS NOTES
"RN Review of Medical History / Physical Health Screen  Outpatient Mental Health Programs - [facility] Adult Partial Hospitalization Program    PATIENT'S NAME: Jossy Matthews  MRN:   [id number]  :   1995  ACCT. NUMBER: [id number]  CURRENT AGE:  27 year old    DATE OF DIAGNOSTIC ASSESSMENT: 22 (pt was hospitalized)  DATE OF ADMISSION: 23     Please see Diagnostic Assessment for additional Medical History.     General Health:   Have you had any exposure to any communicable disease in the past 2-3 weeks? no     Are you aware of safe sex practices? yes   Do you have a history of seizures?     If so, do you have a seizure plan? Known triggers?     Notify patient that we will call 911 (if virtual) or a code (if in-person), if we were to witness seizure during group. no    no  no    yes     Nutrition:    Are you on a special diet? If yes, please explain:  no   Do you have any concerns regarding your nutritional status? If yes, please explain:  no   Have you had any appetite changes in the last 3 months?  Yes, decreased     Have you had any weight loss or weight gain in the last 3 months?  No     Do you have a history of an eating disorder? no   Do you have a history of being in an eating disorder program? no         Height/Weight Review:  Patient reported height:  5'4\"      Patient reports weight:  Date last checked:  170lb      Any referrals/needs identified?            Patient height and weight recorded by RN in epic flowsheet: No; Unable to measure  Programmatic Care currently provided via telehealth. All pt weights and heights will be collected through patient self-report and recorded in physical health screening progress note upon admission to the program.      BMI Review:  Was the patient informed of BMI? no      Findings No Intervention         Fall Risk:   Have you had any falls in the past 3 months? no     Do you currently use any assistive devices for mobility?   no      Does the " "patient have medication concerns? yes \"new for me, still feel weird, heart races\"   If yes, RN to triage if patient will address concerns with their community provider or with our program psychiatrist.     Does the patient have any acute or chronic pain concerns that might impact participation in the program? no       Additional Comments/Assessment: PCP appt this month    Per completion of the Medical History / Physical Health Screen, is there a recommendation to see / follow up with a primary care physician/clinic or dentist?    No.      Karlene Youssef RN  1/4/2023          "

## 2023-01-04 NOTE — PLAN OF CARE
"OCCUPATIONAL THERAPY GROUP NOTE:     01/02/23 1604   Group Therapy Session   Group Attendance attended group session   Time Session Began 1315   Time Session Ended 1400   Total Time (minutes) 45   Total # Attendees 3   Group Type life skill   Group Topic Covered balanced lifestyle;coping skills/lifestyle management;relaxation techniques   Group Session Detail OT Chair Yoga Group   Patient Response/Contribution cooperative with task;listened actively;organized   Patient Participation Detail Pt actively participated in a structured occupational therapy group with a focus on coping through movement via chair yoga as a strategy to facilitate therapeutic exercise, calming, and stress management. Pt actively followed all of the movements, and remained attentive and engaged throughout group. Pt verbalized feeling \"good\" at the end of group. Pt contributed at least one idea to a discussion at the end of group regarding the benefits of exercise, stretching, and deep breathing.        "

## 2023-01-04 NOTE — PLAN OF CARE
OCCUPATIONAL THERAPY GROUP NOTE:     01/03/23 1405   Group Therapy Session   Group Attendance attended group session   Time Session Began 1115   Time Session Ended 1200   Total Time (minutes) 45   Total # Attendees 3   Group Type psychoeducation   Group Topic Covered balanced lifestyle;coping skills/lifestyle management;problem-solving;self-care activities   Group Session Detail OT Mental Health Management Group   Patient Response/Contribution cooperative with task;listened actively;engaged with support;appeared fatigued;pleasant   Patient Participation Detail Pt actively participated in a structured occupational therapy group that involved making a coping plan to utilize upon discharge. Pt participated in a journaling exercise focused on identifying support systems to call, supportive activities to engage in alone, supportive activities to engage in with others, a place they can go to feel safe, and something they can do to feel a sense of accomplishment. Pt verbalized that the journaling activity felt difficult for her, and that she was not able to come up with coping skill ideas for any of the categories in the exercise. With supportive group discussion, she was able to identify that going for a walk when the weather is nice feels relaxing to her when she is overwhelmed. Pt was comfortable sharing this information with peers, and was receptive to suggestions from peers that she might try upon discharge.

## 2023-01-04 NOTE — PROGRESS NOTES
Safety Plan:  Adult Short Safety Plan:   Name: Jossy Matthews  YOB: 1995  Date: January 3, 2023, reviewed/updated LIAM Navarro on 1/18/2023 at 3:06 PM    My primary care provider: Primary Care Provider: [facility]        My Triggers:     I think it was a medication change, I didn't sleep well for a whole month, I don't like my job and I was stressing out about everything at once. Additional People, Places, and Things that I can access for support: parents and brother            What is important to me and makes life worth living: My family is very important to me.  [employer], helping people .            GREEN     Good Control  1. I feel good  2. No suicidal thoughts   3. Can work, sleep and play        Action Steps  1. Self-care: balanced meals, exercising, sleep practices, etc.  2. Take your medications as prescribed.  3. Continue meetings with therapist and prescriber.  4.  Do the healthy things that I enjoy.                YELLOW  Getting Worse  I have ANY of these:  1. I do not feel good  2. Difficulty Concentrating  3. Sleep is changing  4. Increase/Change in my thoughts to hurt self and/or others, but I can still manage and not act on it.   5. Not taking care of self.                Action Steps (in addition to the above):  1. Inform your therapist and psychiatric prescriber/PCP.  2. Keep taking your medications as prescribed.    3. Turn to people you can ask for help.  4. Use internal coping strategies -see below.  5. Create safe environment: notify friends/family of increase in symptoms and reduce means to other identified method                RED  Get Help  If I have ANY of these:  1. Current and uncontrollable thoughts and/or behaviors to hurt self and/or others.        Actions to manage my safety  1. Contact your emergency person jose roberto Matthews (Father)   [phone]  2. Call or Text [phone]  3. Call my crisis team- [facility] [phone]  3. Or  Call 911 or go to the emergency room right away            My Internal Coping Strategies include the following:  Talking with family for support, distraction, walking, reviewing skills from PHP handouts, read, watch shows or movies.     Safety Concerns  How To Identify Situations That Make Your Mental Health Worse:  Triggers are things that make your mental health worse.  Look at the list below to help you find your triggers and what you can do about them.      1. Identify Early Warning Signs:     Sometimes symptoms return, even when people do their best to stay well. Symptoms can develop over a short period of time with little or no warning, but most of the time they emerge gradually over several weeks.  Early warning signs are changes that people experience when a relapse is starting. Some early warning signs are common and others are not as common.   Common Early Warning Signs:    Feeling tense or nervous, Eating less or eating more, Trouble sleeping -either too much or too little sleep, Feeling depressed or low, Feeling irritable, Feeling like not being around other people, Trouble concentrating, Urges to harm self and Urges to use drugs or alcohol                 2. Identify action steps to take when warning signs are noticed:     Taking Action- It is important to take action if you are experiencing early warning signs of a relapse.  The faster you act, the more likely it is that you can avoid a full relapse.  It is helpful to identify several specific ways to cope with symptoms.       The following is my list of symptoms and coping strategies that I can use when they are present:     Symptom Coping Strategies   Anxiety -Talk with someone in your support system and let him or her know how you are feeling.  -Use relaxation techniques such as deep breathing or imagery.  -Use positive affirmations to counteract negative self-talk such as  I am learning to let go of worry.    Depression - Schedule your day; include  activities you have to do and activities you enjoy doing.  - Get some exercise - walk, run, bike, or swim.  - Give yourself credit for even the smallest things you get done.   Sleep Difficulties    - Go to sleep at the same time every day.  - Do something relaxing before bed, such as drinking herbal tea or listening to music.  - Avoid having discussions about upsetting topics before going to bed.   Concentration Difficulties - Minimize distractions so there is only one thing for you to focus on at a time.    - Ask the person you are having a conversation with to slow down or repeat things you are unsure of.

## 2023-01-04 NOTE — PROGRESS NOTES
"    Admission SBAR NOTE  Adult  Outpatient Programs          SITUATION:     Admission Date: 2023    Provider verified identity through the following two step process.  Patient provided: verbal spelling of full first and last name and Patient     Patient name:  Jossy Matthews  Preferred name: Jossy She/Her/Hers/Herself 27 year old  Diagnosis/-es (copy from [employer], including ICD-10):   . Principal DSM5 Diagnoses  (Sustained by DSM5 Criteria Listed Above):   296.33 (F33.2) Major Depressive Disorder, Recurrent Episode, Severe _ and With anxious distress  300.02 (F41.1) Generalized Anxiety Disorder.      Assigned Program/Track: PHP    Reviewed patient's schedule and informed them of any variation due to holidays. yes    Does the patient have any planned absences and/or barriers to admission/treatment? yes 1/10/23 at 10 am in person  NOTE: impact of transportation, technology, childcare, work, or housing concerns.    Insurance: No coverage found.   Changes/Concerns: no    Does patient need an appointment with the program provider? yes  NOTE: If yes, please confirm/schedule provider visit.      BACKGROUND:     Patient's stated goal/reason for treatment (copy from [employer]; confirm with patient):  \"Depression, anxiety, insomnia, suicide attempt\"    ASSESSMENT:     Please consult  if any of the following concerns may impact admission/participation in program:     PHQ, GURU and PROMIS done within 7 days OR send upon admission if over 7 days.      Screven Suicide Severity Rating (select Lifetime/Recent):   Screven Suicide Severity Rating Scale (Lifetime/Recent)  Screven Suicide Severity Rating (Lifetime/Recent) 2022 2022 2022 2022 1/3/2023   Wish to be Dead (Lifetime) - - - Yes -   Q1 Wished to be Dead (Past Month) yes - yes yes yes   Q2 Suicidal Thoughts (Past Month) yes - yes yes yes   Q3 Suicidal Thought Method yes - yes yes yes   Q4 Suicidal Intent without Specific Plan no - no " yes no   Q5 Suicide Intent with Specific Plan yes - yes yes no   Q6 Suicide Behavior (Lifetime) yes - - yes yes   Within the Past 3 Months? yes - - - -   Level of Risk per Screen high risk - high risk high risk moderate risk   1. Wish to be Dead (Lifetime) - 1 - - -   1. Wish to be Dead (Past 1 Month) - 1 - - -   2. Non-Specific Active Suicidal Thoughts (Lifetime) - 1 - - -   2. Non-Specific Active Suicidal Thoughts (Past 1 Month) - 1 - - -   3. Active Suicidal Ideation with any Methods (Not Plan) Without Intent to Act (Lifetime) - 0 - - -   3. Active Suicidal Ideation with any Methods (Not Plan) Without Intent to Act (Past 1 Month) - 1 - - -   4. Active Suicidal Ideation with Some Intent to Act, Without Specific Plan (Lifetime) - 0 - - -   4. Active Suicidal Ideation with Some Intent to Act, Without Specific Plan (Past 1 Month) - 1 - - -   5. Active Suicidal Ideation with Specific Plan and Intent (Lifetime) - 0 - - -   5. Active Suicidal Ideation with Specific Plan and Intent (Past 1 Month) - 1 - - -   Active Suicidal Ideation with Specific Plan and Intent Description (Past 1 Month) - hanging, - - -   Most Severe Ideation Rating (Lifetime) - 2 - - -   Most Severe Ideation Rating (Past 1 Month) - 5 - - -   Description of Most Severe Ideation (Past 1 Month) - hanging,wanting to die - - -   Frequency (Lifetime) - 1 - - -   Frequency (Past 1 Month) - 5 - - -   Duration (Lifetime) - 1 - - -   Duration (Past 1 Month) - 4 - - -   Controllability (Lifetime) - 1 - - -   Controllability (Past 1 Month) - 5 - - -   Deterrents (Lifetime) - 0 - - -   Deterrents (Past 1 Month) - 5 - - -   Reasons for Ideation (Lifetime) - 5 - - -   Reasons for Ideation (Past 1 Month) - 5 - - -   Actual Attempt (Lifetime) - 1 - - -   Total Number of Actual Attempts (Lifetime) - 1 - - -   Actual Attempt Description (Lifetime) - attempted to hang self - - -   Actual Attempt (Past 3 Months) - 1 - - -   Total Number of Actual Attempts (Past 3 Months)  - 1 - - -   Actual Attempt Description (Past 3 Months) - see above - - -   Has subject engaged in non-suicidal self-injurious behavior? (Lifetime) - 0 - - -   Interrupted Attempts (Lifetime) - 0 - - -   Aborted or Self-Interrupted Attempt (Lifetime) - 0 - - -   Preparatory Acts or Behavior (Lifetime) - 0 - - -   Most Recent Attempt Date - 66471 - - -   Actual Lethality/Medical Damage Code (Most Recent Attempt) - 1 - - -   Potential Lethality Code (Most Recent Attempt) - 2 - - -   Most Lethal Attempt Date - 66471 - - -   Actual Lethality/Medical Damage Code (Most Lethal Attempt) - 1 - - -   Potential Lethality Code (Most Lethal Attempt) - 2 - - -   Initial/First Attempt Date - 66471 - - -   Actual Lethality/Medical Damage Code (Initial/First Attempt) - 1 - - -   Potential Lethality Code (Initial/First Attempt) - 2 - - -   Calculated C-SSRS Risk Score (Lifetime/Recent) - High Risk - - -       WHODAS completed for recommended level of care? no     Copy/Paste current Safety Plan to the [facility] TX PLAN ENCOUNTER. yes    Safety status/concerns: denies currently.  Agrees to print and follow safety plan- will resend via [employer] (now set up)     Substance use concerns: no    Pertinent Medical/Nutritional concerns: no    Review Tele-Health Requirements (including secure environment, confidentiality, in-state status, equipment needs and process - encourage MyChart): yes    Confirm Emergency Contact listed in the SnapShot/Demographics with patient and notify [facility] if an update is required. yes  Filemon [phone]   Paper or Docusign requirements for ROIs, e-MOLLY, emergency contact, etc have been completed? yes  If not, do upon admission.     Does patient have FMLA or Short-Term Disability requests/plans? yes will forward paperwork to nurse  NOTE: Whenever possible, FMLA or Short-Term Disability paperwork needs to be managed/completed by the patient's community provider.   Exceptions: Patient does not have a community  provider AND request is specific to mental health and time off for the duration of the program participation.    Notify RN Triage as soon as possible.     Care Providers/Medication Management Needs:     Does patient have a current community or other [facility] provider prescribing medications for mental health? yes  NOTE: Delete below if not applicable:    Psychiatric Provider (or PCP if managing MH meds)/Name: PCP is prescriber  (next appt is 1/10 at 10 am)    Pt has referral to transition clinic from inpt stay.            RECOMMENDATIONS:     Patient Admission Completed: yes    Care Team, referrals made/needed: no  PCP: No Ref-Primary, Physician  NOTE: Notify RN, as needed, to make internal referrals.                                                             Completed by: Karlene Youssef RN

## 2023-01-05 ENCOUNTER — HOSPITAL ENCOUNTER (OUTPATIENT)
Dept: BEHAVIORAL HEALTH | Facility: CLINIC | Age: 28
Discharge: HOME | End: 2023-01-05
Attending: PSYCHIATRY & NEUROLOGY
Payer: COMMERCIAL

## 2023-01-05 DIAGNOSIS — F33.2 MAJOR DEPRESSIVE DISORDER, RECURRENT EPISODE, SEVERE WITH MIXED FEATURES (H): ICD-10-CM

## 2023-01-05 DIAGNOSIS — F41.1 GAD (GENERALIZED ANXIETY DISORDER): Primary | ICD-10-CM

## 2023-01-05 PROCEDURE — 99205 OFFICE O/P NEW HI 60 MIN: CPT | Mod: GT | Performed by: PSYCHIATRY & NEUROLOGY

## 2023-01-05 PROCEDURE — H0035 MH PARTIAL HOSP TX UNDER 24H: HCPCS | Mod: GT,95 | Performed by: COUNSELOR

## 2023-01-05 PROCEDURE — H0035 MH PARTIAL HOSP TX UNDER 24H: HCPCS | Mod: GT,95 | Performed by: OCCUPATIONAL THERAPIST

## 2023-01-05 PROCEDURE — H0035 MH PARTIAL HOSP TX UNDER 24H: HCPCS | Mod: GT,95

## 2023-01-05 ASSESSMENT — COLUMBIA-SUICIDE SEVERITY RATING SCALE - C-SSRS
LETHALITY/MEDICAL DAMAGE CODE MOST LETHAL POTENTIAL ATTEMPT: BEHAVIOR LIKELY TO RESULT IN DEATH DESPITE AVAILABLE MEDICAL CARE
1. SINCE LAST CONTACT, HAVE YOU WISHED YOU WERE DEAD OR WISHED YOU COULD GO TO SLEEP AND NOT WAKE UP?: NO
2. HAVE YOU ACTUALLY HAD ANY THOUGHTS OF KILLING YOURSELF?: NO
MOST LETHAL DATE: 66471
LETHALITY/MEDICAL DAMAGE CODE MOST LETHAL ACTUAL ATTEMPT: MINOR PHYSICAL DAMAGE

## 2023-01-05 ASSESSMENT — ANXIETY QUESTIONNAIRES
1. FEELING NERVOUS, ANXIOUS, OR ON EDGE: MORE THAN HALF THE DAYS
7. FEELING AFRAID AS IF SOMETHING AWFUL MIGHT HAPPEN: SEVERAL DAYS
8. IF YOU CHECKED OFF ANY PROBLEMS, HOW DIFFICULT HAVE THESE MADE IT FOR YOU TO DO YOUR WORK, TAKE CARE OF THINGS AT HOME, OR GET ALONG WITH OTHER PEOPLE?: VERY DIFFICULT
GAD7 TOTAL SCORE: 11
GAD7 TOTAL SCORE: 11
5. BEING SO RESTLESS THAT IT IS HARD TO SIT STILL: MORE THAN HALF THE DAYS
7. FEELING AFRAID AS IF SOMETHING AWFUL MIGHT HAPPEN: SEVERAL DAYS
7. FEELING AFRAID AS IF SOMETHING AWFUL MIGHT HAPPEN: SEVERAL DAYS
IF YOU CHECKED OFF ANY PROBLEMS ON THIS QUESTIONNAIRE, HOW DIFFICULT HAVE THESE PROBLEMS MADE IT FOR YOU TO DO YOUR WORK, TAKE CARE OF THINGS AT HOME, OR GET ALONG WITH OTHER PEOPLE: VERY DIFFICULT
5. BEING SO RESTLESS THAT IT IS HARD TO SIT STILL: MORE THAN HALF THE DAYS
1. FEELING NERVOUS, ANXIOUS, OR ON EDGE: MORE THAN HALF THE DAYS
7. FEELING AFRAID AS IF SOMETHING AWFUL MIGHT HAPPEN: SEVERAL DAYS
GAD7 TOTAL SCORE: 11
3. WORRYING TOO MUCH ABOUT DIFFERENT THINGS: MORE THAN HALF THE DAYS
8. IF YOU CHECKED OFF ANY PROBLEMS, HOW DIFFICULT HAVE THESE MADE IT FOR YOU TO DO YOUR WORK, TAKE CARE OF THINGS AT HOME, OR GET ALONG WITH OTHER PEOPLE?: VERY DIFFICULT
4. TROUBLE RELAXING: MORE THAN HALF THE DAYS
2. NOT BEING ABLE TO STOP OR CONTROL WORRYING: MORE THAN HALF THE DAYS
7. FEELING AFRAID AS IF SOMETHING AWFUL MIGHT HAPPEN: SEVERAL DAYS
GAD7 TOTAL SCORE: 11
2. NOT BEING ABLE TO STOP OR CONTROL WORRYING: MORE THAN HALF THE DAYS
IF YOU CHECKED OFF ANY PROBLEMS ON THIS QUESTIONNAIRE, HOW DIFFICULT HAVE THESE PROBLEMS MADE IT FOR YOU TO DO YOUR WORK, TAKE CARE OF THINGS AT HOME, OR GET ALONG WITH OTHER PEOPLE: VERY DIFFICULT
6. BECOMING EASILY ANNOYED OR IRRITABLE: NOT AT ALL
2. NOT BEING ABLE TO STOP OR CONTROL WORRYING: MORE THAN HALF THE DAYS
4. TROUBLE RELAXING: MORE THAN HALF THE DAYS
GAD7 TOTAL SCORE: 11
6. BECOMING EASILY ANNOYED OR IRRITABLE: NOT AT ALL
3. WORRYING TOO MUCH ABOUT DIFFERENT THINGS: MORE THAN HALF THE DAYS
8. IF YOU CHECKED OFF ANY PROBLEMS, HOW DIFFICULT HAVE THESE MADE IT FOR YOU TO DO YOUR WORK, TAKE CARE OF THINGS AT HOME, OR GET ALONG WITH OTHER PEOPLE?: VERY DIFFICULT
4. TROUBLE RELAXING: MORE THAN HALF THE DAYS
GAD7 TOTAL SCORE: 11
1. FEELING NERVOUS, ANXIOUS, OR ON EDGE: MORE THAN HALF THE DAYS
IF YOU CHECKED OFF ANY PROBLEMS ON THIS QUESTIONNAIRE, HOW DIFFICULT HAVE THESE PROBLEMS MADE IT FOR YOU TO DO YOUR WORK, TAKE CARE OF THINGS AT HOME, OR GET ALONG WITH OTHER PEOPLE: VERY DIFFICULT
5. BEING SO RESTLESS THAT IT IS HARD TO SIT STILL: MORE THAN HALF THE DAYS
3. WORRYING TOO MUCH ABOUT DIFFERENT THINGS: MORE THAN HALF THE DAYS
6. BECOMING EASILY ANNOYED OR IRRITABLE: NOT AT ALL
7. FEELING AFRAID AS IF SOMETHING AWFUL MIGHT HAPPEN: SEVERAL DAYS
GAD7 TOTAL SCORE: 11

## 2023-01-05 NOTE — GROUP NOTE
Psychoeducation Group Note    PATIENT'S NAME: Jossy Matthews  MRN:   [id number]  :   1995  ACCT. NUMBER: [id number]  DATE OF SERVICE: 23  START TIME: 11:00 AM  END TIME: 11:50 AM  FACILITATOR: Natalie Harper OTR/L  TOPIC: MH PHP OT Group: Partial Hospitalization Program- Occupational Therapy  [facility] Adult Partial Hospitalization Program  TRACK: 1    NUMBER OF PARTICIPANTS: 2                                      Service Modality:  Video Visit     Telemedicine Visit: The patient's condition can be safely assessed and treated via synchronous audio and visual telemedicine encounter.      Reason for Telemedicine Visit: Services only offered telehealth    Originating Site (Patient Location): Patient's home    Distant Site (Provider Location): Provider Remote Setting- Home Office    Consent:  The patient/guardian has verbally consented to: the potential risks and benefits of telemedicine (video visit) versus in person care; bill my insurance or make self-payment for services provided; and responsibility for payment of non-covered services.     Patient would like the video invitation sent by:  My Chart    Mode of Communication:  Video Conference via Medical Zoom    As the provider I attest to compliance with applicable laws and regulations related to telemedicine.         Summary of Group / Topics Discussed:  Coping skills: Opposite to Emotion:  Patients discussed past and present struggles with knowing how to make changes in their lives due to difficult emotional experiences and high level of mental health symptoms.  Reviewed the therapeutic skill of opposite action and patients explored opportunities to use their behaviors as a tool to reduce an emotion and help engage in tasks/behaviors that they want to change.        Patient Session Goals / Objectives:    Review DBT concepts and focus on patient's experiences of distress and difficult emotional experiences.    Learn how to do the opposite of what  an emotion makes us want to do in an effort to decrease an unwanted emotional experience.    Demonstrate understanding of the skill of opposite action by sharing experiences where the technique could be useful in past / present situations.    Applied concept to everyday life and occupations, planning and problem solving as needed            Patient Participation / Response:  Fully participated with the group by sharing personal reflections / insights and openly received / provided feedback with other participants.    Patient presentation: constricted affect observed; active in asking questions in session, Verbalized understanding of content and Patient would benefit from additional opportunities to practice the content to be able to generalize it to their everyday life with increased intentionality, consistency, and efficacy in support of their psychiatric recovery     Jossy began the Partial Hospitalization Program today.  She was welcomed and oriented to OT groups.  Encouraged Jossy to ask questions as needed.       Treatment Plan:  Patient has an initial individualized treatment plan that was created as part of their diagnostic assessment / admission process.  A master individualized treatment plan is in the process of being developed with the patient and multi-disciplinary care team.  Continue to assess.  Will complete OT assessment with Jossy in the next couple of treatment days.      ANT Dunn/L

## 2023-01-05 NOTE — H&P
"[facility]   Adult Mental Health Outpatient Programs  Provider Intake Note    Program (track): [facility] (track 1)    Patient: Jossy Matthews  MRN: [id number]  : 1995  Acct. No.: [id number]  Date of Service:  23  Session Start Time:  1315  Session End Time:  1355      Diagnostic Assessment Date: 1/3/2023    Outpatient Providers:  Current Outpatient Psychiatric Provider: none; primary care provider has been prescribing medications  Current Outpatient Individual Psychotherapist: none; referral has been placed  Primary Care Provider: Rosetta Lowe MD at [facility]    Identifying Data:  Jossy Matthews, a 27 year old-year-old with history of severe anxiety, depression, presents for initial visit to provide oversight of programmatic care. Patient attended the phone/video session alone, uses she/her pronouns, and prefers to be called: \"Jossy\"    Presenting Concern:  \"I'm super concerned about the medication\"  Referred to programming from inpatient psychiatry    History of Present Illness:  Chart reviewed, history as documented reviewed with Jossy. Patient endorses:    \"I'm super concerned about the medication\"  o Feeling \"out of it,\" and \"weird head feeling,\"   o Also, \"increased heart rate, tingly feeling...\"  o Both are new since starting gabapentin and buspirone    \"Growing up was great, awesome\"  o When asked, was not being ironic    In ninth grade broke up with a longtime friend  o Subsequently \"developed social anxiety, felt like I couldn't be with myself\"    \"Age 16 parents forced me to get a job\"  o Patient found the work challenging and anxiety increased  o \"[My parents] sent me to therapy, [and I] started sertraline\"    \"Since I was young my parents have always helped me with things  [I have] never been independent\"  o Feels this is a contributor to anxiety    After high school patient attended college for a couple of years, then studied abroad " "in [place]. 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   o As work has added more responsibilities, \"I felt like I couldn't handle it.\"  - \"I still feel like I can't handle it\"  o Noted increasing anxiety at and as a result of work, with increase in crying and feeling more and more overwhelmed    \"I thought I needed to change medication\"  o Talked with primary care provider who changed from sertraline to escitalopram  o \"That was absolutely fucking terrible\"  o Noted diaphoresis, insomnia, \"dissociative -- not feeling like myself\"  o Attempted to cross-taper back to sertraline  o Insomnia continued  o Noted, \"panicky, obsessive thoughts: 'should I just kill myself?'\"  o Began researching online ways of completing suicide  o Wrapped a belt around her neck with the thought \"let me just try this and see how it goes\"  o \"But I didn't actually go through with it,\" unwrapped the belt and went to bed  o Went to work the next morning, \"panicked again and called my parents\"  o Was brought to the emergency department and subsequently hospitalized    \"Now I feel stuck in a way and I don't feel better\"    \"And I still don't have the life skills\"    Goals for Treatment (in addition to those goals listed in the [facility] Treatment Plan Encounter):    \"learn how to manage the anxiety\"      Psychiatric Review of Symptoms:  Review of systems recorded in diagnostic assessment reviewed with patient.  Today notes:    \"Right now it's hard for me " "to concentrate and focus,\" attributes this more to discomfort from new medications than from anxiety    \"I don't want to die and I don't want to live this way -- so overwhelmed and afraid,\" denies suicidal ideation as such and does not find a current state of affairs in any way sustainable      Safety Assessment:    Suicidal ideation: see above    Thoughts of non-suicidal self-injury: denied    Recent self-injurious behavior: denied    Homicidal ideation: denied    Other safety concerns: denied    Substance use:    Rare cannabis (last at [place])    Medications:  Current Outpatient Medications   Medication Sig Dispense Refill     aspirin-acetaminophen-caffeine (EXCEDRIN EXTRA STRENGTH) 250-250-65 MG tablet Take 1 tablet by mouth daily as needed for headaches       busPIRone (BUSPAR) 10 MG tablet Take 1 tablet (10 mg) by mouth 3 times daily 90 tablet 0     gabapentin (NEURONTIN) 300 MG capsule Take 1 capsule (300 mg) by mouth 3 times daily 90 capsule 0     hydrOXYzine (ATARAX) 50 MG tablet Take 1 tablet (50 mg) by mouth every 4 hours as needed for anxiety 30 tablet 0     melatonin 5 MG tablet Take 1 tablet (5 mg) by mouth nightly as needed for sleep 30 tablet 0     multivitamin w/minerals (CENTRUM ADULTS) tablet Take 1 tablet by mouth daily       norethindrone-ethinyl estradiol (JUNEL FE 1/20) 1-20 MG-MCG tablet Take 1 tablet by mouth daily       sertraline (ZOLOFT) 100 MG tablet Take 1 tablet (100 mg) by mouth daily 30 tablet 0     traZODone (DESYREL) 50 MG tablet Take 1-2 tablets ( mg) by mouth nightly as needed for sleep 60 tablet 0         The above list was reviewed and updated in [facility] with patient today.     Patient is taking medications as prescribed and reports adverse effects as noted above    Medical Review of Systems:  Pertinent: None      Recent Screenings:  WHODAS 2.0 Total Score 1/3/2023   Total Score 17       Metrics:  PHQ-9 scores:   PHQ-9 SCORE 1/3/2023   PHQ-9 Total Score 16       GURU-7 " scores:   GURU-7 SCORE 1/5/2023 1/5/2023 1/5/2023   Total Score - - 11 (moderate anxiety)   Total Score 11 11 11       CSSR-S:   PHQ 1/3/2023   PHQ-9 Total Score 16   Q9: Thoughts of better off dead/self-harm past 2 weeks More than half the days         Psychiatric History:   Outpatient providers listed above.    Past medication trials include:    Escitalopram per above    Otherwise as noted above or in diagnostic assessment.       Substance Use History:  As noted above or in diagnostic assessment.     Past Medical History:  As noted above or in diagnostic assessment.     Vital Signs:  None since this is a phone/video visit.     Labs:  Most recent labs reviewed. Pertinent updates/findings: None.     Family History:   As noted above or in diagnostic assessment.     Social History:   As noted above or in diagnostic assessment.     Legal History:  As noted above or in diagnostic assessment.     Significant Losses / Trauma / Abuse / Neglect Issues:  As noted above or in diagnostic assessment.       Mental Status Examination (limited due to video virtual visit format):  Vital Signs: There were no vitals taken for this virtual visit.  Appearance: appropriately groomed, appears stated age, and in mild distress.  Attitude: cooperative   Eye Contact: good to the extent that can be determined in a video visit  Muscle Strength and Tone: no gross abnormalities based on remote observation  Psychomotor Behavior: Appropriate, Restless and Fidgety; no evidence of tardive dyskinesia, dystonia, or tics based on remote observation  Gait and Station: normal, no gross abnormalities based on remote observation  Speech: clear, coherent, normal prosody, regular rhythm, fluent and normal volume, rapid but not pressured  Associations: No loosening of associations  Thought Process: coherent, goal directed and ruminative  Thought Content: no evidence of suicidal ideation or homicidal ideation, no evidence of psychotic thought, no auditory  "hallucinations present and no visual hallucinations present  Mood: \"anxious\"  Affect: mood congruent, intensity is normal, constricted mobility and reactive  Insight: good  Judgment: intact, adequate for safety  Impulse Control: intact  Oriented to: time, place, person and situation  Attention Span and Concentration: normal  Language: Intact  Recent and Remote Memory: intact to interview. Not formally assessed. No amnesia.  Fund of Knowledge/Assessment of Intelligence: Average  Capacity of Activities of Daily Living: Independent, able to participate in programmatic care services.      DSM5 Diagnosis/es:  1. GURU (generalized anxiety disorder)    2. Major depressive disorder, recurrent episode, severe with mixed features (H)        Assessment/Plan:  Jossy presents today for initial provider visit as part of program intake, coordination, and supervision. Clinical focus today was on anxiety more than depression so I have not ruled out the latter but will focus treatment on the former today.    Phenomenologically, patient's increase in anxiety appears to be related to stressors at work, and with more conversation appears to be related not only to the stressors related to work but also her intense dislike of this particular job. Also offered the (hopefully) therapeutic intervention that most of us are uncertain when it comes to many aspects of life.    Increase in anxiety led to suicide attempt and subsequent hospitalization so I agree that partial hospital is most appropriate level of care. Encouraged patient to focus on skills related to anxiety management as these will allow for more energy and focus on learning job and life skills as needed.    Cognitive symptoms that the patient describes are not uncommon on gabapentin and are likely more attributable to it than to buspirone. We will therefore discontinue gabapentin and make no other changes to medication today. If adverse effects improve then patient is instructed " to increase sertraline to 150 mg starting Saturday morning and I will visit with her again next week. If patient notes additional discomfort or symptoms have not started to improve, I will meet with her again tomorrow and we will revisit treatment options.    (F41.1) GURU (generalized anxiety disorder)  (primary encounter diagnosis)  Comment: see discussion above  Plan:     Discontinue gabapentin    Continue buspirone    Continue sertraline 100 mg by mouth daily today and tomorrow. If the above changes are tolerated, can increase to 150 mg on Saturday (1/7)    Continue partial hospital program    (F33.2) Major depressive disorder, recurrent episode, severe with mixed features (H)  Comment: see above  Plan: see above        Risk Assessment  o Today Jossy denies any current safety concerns including suicidal ideation, self-harm, and homicidal ideation   o Jossy is future-oriented and engaged in treatment planning   o I do not feel that Jossy meets criteria for a 72-hour involuntary hold and remains appropriate for an outpatient level of care.     Continue therapy as planned:    Enrolled in partial hospital program    Patient continues to meet criteria for recommended level of care.    Patient is expected to make a timely and significant improvement in the presenting acute symptoms as a result of participation in this program.    Patient would be at reasonable risk of requiring a higher level of care in the absence of current services.    Continue with individual therapist as appropriate    Safety plan reviewed.     To the Emergency Department as needed or call after hours crisis line at [phone] or [phone]. [place] Crisis Text Line: Text [place] to 481810  or  Suicide LifeLine Chat: suicidepreventionlifeline.org/chat    Follow-up:     schedule an appointment with me or another program provider in approximately in 1 week(s) or sooner if needed.  Can speak with a staff member or call the appropriate program  number (see below) to schedule    Follow up with outpatient provider(s) as planned or sooner if needed for acute medical concerns.    Questions or concerns:    Call program line with questions or concerns (see below)    Vladhart may be used to communicate with your provider, but this is not intended to be used for emergencies.      [facility] Adult Mental Health Program lines:  [facility]: [phone]  Dual Disorder: [phone]  Adult Day Treatment:  [phone]  55+/Intensive Outpatient: [phone]      Community Resources:    National Suicide Prevention Lifeline: 988 from any phone, or 668-233-2498 (TTY: 437.986.5804). Call anytime for help.  (www.suicidepreventionlifeline.org)  National [place] [employer] Mental Illness (www.freya.org): 407.704.1719 or 602-770-0353.   Mental Health Association (www.mentalhealth.org): 930.500.3304 or 453-019-8593.  [place] Crisis Text Line: Text [place] to 696372  Suicide LifeLine Chat: suicideIntooBR.org/chat    Treatment Objective(s) Addressed in This Session:  One purpose of today's call is for this writer to provide oversight of patient's care while receiving program services. Specific treatment goals addressed included personal safety, symptoms stabilization and management, wellness and mental health, and community resources/discharge planning.     Patient agrees with the current plan of care.    Signed:   Denis Ahn MD   January 5, 2023      Visit Details:  Type of service:  Video Visit    Start/End Time: see above    Originating Location (pt. Location): Home in [place]    Distant Location (provider location): Provider Remote Setting- Home Office    Platform used for Video Visit: Zoom    Physician has received verbal consent for a Video Visit from the patient? Yes    65 minutes spent on the date of the encounter doing chart review, patient visit, documentation and discussion with other provider(s)     This document completed in part using r[employer]  One dictation software.  Please excuse any inadvertent word or phrase substitutions.

## 2023-01-05 NOTE — PROGRESS NOTES
Adult Partial Hospitalization Program:  Individualized Treatment Plan     Date of Plan: 1/10/2023    Name: Jossy Matthews MRN: [id number]  :   1995    Programs: Adult Partial Hospitalization Program    DSM5 Primary Diagnosis:  Principal DSM5 Diagnoses  (Sustained by DSM5 Criteria Listed Above):   296.33 (F33.2) Major Depressive Disorder, Recurrent Episode, Severe _ and With anxious distress  300.02 (F41.1) Generalized Anxiety Disorder    Adult Partial Hospitalization Program Multidisciplinary Team Members: Alex Taylor LMFT, Asia Pinon [facility],  Lupe Youssef, ERMELINDA, LIAM Blake; Natalie Harper OTR/L; Annel Dobbs MOTR/L; Denis Ahn MD    Jossy Matthews will participate in the Adult Partial Hospitalization Program 5 days per week, 5 hours per day. Anticipated duration/discharge: 2 weeks    Due to COVID-19, services will be delivered via telemedicine until further notice.     Program Start Date: 2023  Anticipated Discharge Date: 2023 (pending authorization/clinical changes)      Jossy Matthews  has been discharged from an inpatient hospital [place], and the PHP is in lieu of continued inpatient [place].     Review Date: Does Jossy Matthews continue to meet criteria to participate in the Partial Hospitalization Program, 5 days per week; 5 hours per day?   1/10/2023 yes   23 Yes  No - discharge       Client Strengths:  family support, insight and intelligence    Client Areas of Vulnerability:  Suicidal Ideation   Anxiety  Depressive symptoms     Client Participation in Plan:  Contributed to goals and plan   Attended individual treatment plan meeting on 2023  Received copy of treatment plan     Long-Term Goals:  Knowledge about illness and management of symptoms   Maintenance of personal safety     Abuse Prevention Plan:  Safe, therapeutic environment   Safety coping plan as needed   Education regarding illness and skill development   Coordination with care providers  "    Discharge Criteria:  Satisfactory progress toward treatment goals   Has safety/coping plan in place   Regular attendance as scheduled       Areas of Treatment Focus       Why are you seeking treatment/What do you want to focus on during treatment? \"Depression, anxiety, insomnia, suicide attempt\"      Area of Treatment Focus:  Personal Safety  Start Date:    1/10/2023    Problem Description:  Katy Suicide Severity Rating Scale (Lifetime/Recent)  Katy Suicide Severity Rating (Lifetime/Recent) 12/29/2022 12/29/2022 12/29/2022 12/30/2022 1/3/2023   Wish to be Dead (Lifetime) - - - Yes -   Q1 Wished to be Dead (Past Month) yes - yes yes yes   Q2 Suicidal Thoughts (Past Month) yes - yes yes yes   Q3 Suicidal Thought Method yes - yes yes yes   Q4 Suicidal Intent without Specific Plan no - no yes no   Q5 Suicide Intent with Specific Plan yes - yes yes no   Q6 Suicide Behavior (Lifetime) yes - - yes yes   Within the Past 3 Months? yes - - - -   Level of Risk per Screen high risk - high risk high risk moderate risk   1. Wish to be Dead (Lifetime) - 1 - - -   1. Wish to be Dead (Past 1 Month) - 1 - - -   2. Non-Specific Active Suicidal Thoughts (Lifetime) - 1 - - -   2. Non-Specific Active Suicidal Thoughts (Past 1 Month) - 1 - - -   3. Active Suicidal Ideation with any Methods (Not Plan) Without Intent to Act (Lifetime) - 0 - - -   3. Active Suicidal Ideation with any Methods (Not Plan) Without Intent to Act (Past 1 Month) - 1 - - -   4. Active Suicidal Ideation with Some Intent to Act, Without Specific Plan (Lifetime) - 0 - - -   4. Active Suicidal Ideation with Some Intent to Act, Without Specific Plan (Past 1 Month) - 1 - - -   5. Active Suicidal Ideation with Specific Plan and Intent (Lifetime) - 0 - - -   5. Active Suicidal Ideation with Specific Plan and Intent (Past 1 Month) - 1 - - -   Active Suicidal Ideation with Specific Plan and Intent Description (Past 1 Month) - hanging, - - -   Most Severe Ideation " Rating (Lifetime) - 2 - - -   Most Severe Ideation Rating (Past 1 Month) - 5 - - -   Description of Most Severe Ideation (Past 1 Month) - hanging,wanting to die - - -   Frequency (Lifetime) - 1 - - -   Frequency (Past 1 Month) - 5 - - -   Duration (Lifetime) - 1 - - -   Duration (Past 1 Month) - 4 - - -   Controllability (Lifetime) - 1 - - -   Controllability (Past 1 Month) - 5 - - -   Deterrents (Lifetime) - 0 - - -   Deterrents (Past 1 Month) - 5 - - -   Reasons for Ideation (Lifetime) - 5 - - -   Reasons for Ideation (Past 1 Month) - 5 - - -   Actual Attempt (Lifetime) - 1 - - -   Total Number of Actual Attempts (Lifetime) - 1 - - -   Actual Attempt Description (Lifetime) - attempted to hang self - - -   Actual Attempt (Past 3 Months) - 1 - - -   Total Number of Actual Attempts (Past 3 Months) - 1 - - -   Actual Attempt Description (Past 3 Months) - see above - - -   Has subject engaged in non-suicidal self-injurious behavior? (Lifetime) - 0 - - -   Interrupted Attempts (Lifetime) - 0 - - -   Aborted or Self-Interrupted Attempt (Lifetime) - 0 - - -   Preparatory Acts or Behavior (Lifetime) - 0 - - -   Most Recent Attempt Date - 66471 - - -   Actual Lethality/Medical Damage Code (Most Recent Attempt) - 1 - - -   Potential Lethality Code (Most Recent Attempt) - 2 - - -   Most Lethal Attempt Date - 66471 - - -   Actual Lethality/Medical Damage Code (Most Lethal Attempt) - 1 - - -   Potential Lethality Code (Most Lethal Attempt) - 2 - - -   Initial/First Attempt Date - 66471 - - -   Actual Lethality/Medical Damage Code (Initial/First Attempt) - 1 - - -   Potential Lethality Code (Initial/First Attempt) - 2 - - -   Calculated C-SSRS Risk Score (Lifetime/Recent) - High Risk - - -       What is important to me and makes life worth living: My family is very important to me.  Adventure, helping people.         Goal: Target Date: 1/16/2023, discharge Status: Stopped  1) Safety:   Client will notify staff when needing  "assistance to develop or implement a coping plan to manage suicidal or self injurious urges.  Client will use coping plan for safety, as needed.  .    Progress:  1/10/2023: Met with team members. Discussed program, process, and progress. Discussed and set treatment goals. Yes, accepted copy of safety plan in [employer] today. Yes, that's something i'm able to do. \"It is slightly better but hasn't change that much.\" Jossy was given information on [facility]. continue.   1/16/2022: Met with team members. Discussed program, process, and progress. Patient reported feeling that safety concerns are maybe 'the same or slightly better.' Confirmed access to safety plan, does not actively use it. Reports using distraction to cope with SI.   1/18/23 - discharge:  Met with team members. Discussed program progress. Jossy reported the suicidal ideation (SI) is \"the same\" since starting PHP.  Jossy denied any self-injurious behavior.  She completed the [place] since last contact assessment and she identified daily suicidal thoughts with thoughts about methods, such as crashing a car, hanging herself, or carbon monoxide, however, she denied having any plan or intent to act on those thoughts.   She reported her ability to cope with the SI has improve slightly since starting PHP, she was unable to identify any specific changes.  Patient committed to safety and agreed to follow her safety coping plan.  If safety concerns increase, Jossy agreed to call 911 or check into the emergency department, if needed to stay safe.  She put the crisis line number in her phone.    Treatment Strategies:  Assist clients in establishing / strengthening support network  Assist to identify treatment goals  Assist with discharge planning  Engage in safety planning when indicated  Facilitate increased self-awareness  Provide education regarding distress tolerance skills     Area of Treatment Focus:  Symptom Management  Start Date:    1/10/2023    " "Description:  Chief Complaint:   The reason for seeking services at this time is: Depression, anxiety, insomnia, suicide attempt prior to this admission  The problem(s) began to worsen three months ago. She has a hx of major depressive disorder and anxiety.  She was brought to the emergency room by her father.      Goal: Target Date: 1/16/2023, discharge Status: Stopped  2) In Psychotherapy groups Jossy will:   Learn and practice 1-3 skills to manage feelings secondary to anxiety and depression, e.g. being \"overwhelmed,\" as well as engage in practice to address lack of focus and/or motivation    As measured by self report and staff observation during groups daily.       Progress:  1/10/2023: Met with team members. Discussed program, process, and progress. Discussed and set treatment goals.\"I struggle with motivation, focus, daily routine, i'm overwhelmed still. I dont know, it's hard to tell [if PHP is a good fit].\" Continue.  1/16/2022:Met with team members. Discussed program, process, and progress. Reported still feeling 'overwhelmed' and expressed feeling like she has 'not absorbed skills from group.' Reported that anxiety and feeling like delaying bigger problems, stated she feels 'afraid I'm not willing to put in the work to get better.' Was able to situations/activities that temporarily relieve feelings of overwhelm.   1/18/23 - discharge:  Met with team members. Discussed program progress. Jossy reported she would like to try \"doing yoga, meditation, opposite action.\"  She reports her anxiety and depression have been about the same since starting PHP, but she feels she has been feeling less motivated to try to use coping skills, she is more willful about taking action.      Treatment Strategies:  Assist clients in establishing / strengthening support network  Assist to identify treatment goals  Assist with discharge planning  Engage in safety planning when indicated  Facilitate increased self " "awareness  Provide education regarding how to use group process, thoughts/behaviors/emotions management, emotional regulation, values identification, distorted thinking, grief and loss, shame, self compassion, self awareness, mindfulness, radical acceptance, distress tolerance skills, hope, problem solving. Communication/interpersonal effectiveness.      Area of Treatment Focus:  Develop / Improve Independent Living Skills  Start Date:    1/10/2023    Description:  Functional Status:  Patient reports the following functional impairments:  health maintenance, self-care, social interactions and work / vocational responsibilities.      Goal: Target Date: 1/16/2023, discharge Status: Stopped  3)  In Occupational Therapy groups Jossy will:    Lifestyle health: Learn, practice, apply 1-3 skills/strategies that support participation in meaningful activities with an emphasis on daily structure and seeking out opportunities to learn \"life skills\" to improve mental health management after discharge.     Self-Regulation: Learn, practice, apply 2 body based self-regulation/mindfulness skills to improve anixety symptoms and increase distress tolerance to support participation in meaningful roles, routines, relationships and responsibilities.      As measured by self report and staff observation during groups daily.     Progress:  1/10/2023: Met with team members. Discussed program, process, and progress. Discussed and set treatment goals. \"I need independent living skills and I dont think i'm going to learn any.\" Jossy would also like to address the need for daily structure. Continue.  1/16/2022: Met with team members. Discussed program, process, and progress. Patient reported not feeling like she is participating more in daily life. Was unable to identify any skills learned/practiced.    1/18/23 - discharge:  Met with team members. Discussed program progress. When asked about using life skills, Jossy reported \"I don't think " "I have.\"  She would like to work on \"cooking for myself, learning more about car stuff, like maintenance, financial stuff, work stuff.\"  For self regulation she has been walking \"a few times a week.\"  She would like to work on  \"doing yoga, meditation, opposite action.\"    Treatment Strategies:  Assist to identify treatment goals  Engage in safety planning when indicated  Facilitate increased self awareness  OT assessment  Provide education regarding:  Lifestyle Health: balance/structure/routine, goal setting and integration, prioritizing and planning, leisure values, behavior activation, weaving a mindful lifestyle and benefits of focused activity,.  Self regulation: sensory modulation, window of tolerance, ANS and vagus nerve activation, self awareness, development of a self regulation plan, sensory enhanced mindfulness, sensory/body based grounding skills.  Resiliency development: Motivation, transitions, energy management, self compassion, stress management, positive thought processes, neuroscience of change.        Area of Treatment Focus:  Community Resources/Discharge Planning  Start Date:    1/10/2023    Description:  My primary care provider: Primary Care Provider: Rosetta Lowe [facility] Next Visit:     Current Outpatient Psychiatric Provider: Sunni Gonzalez APRN CNP, [facility],  [phone]    Next Visit:  1/23/23 at 2:00pm  Ofelia Bagley MD, [facility], [phone]  Next Visit:  3/8/23 at 1:15pm  Current Outpatient Individual Psychotherapist: Hever Dumont, [facility], [phone] Next Visit: 1/19/23 at 12:15pm.    Goal: Target Date: 1/16/2023, discharge   Status: Stopped    4) Wellness and Discharge preparation:     Will improve wellness related behaviors by attending wellness sessions and ask questions as they come up.    Will increase effective use of support / increase ability to ask for help. Invite someone to the " Support Network Education group to discuss your support needs.    Will develop an aftercare / transition plan by discharge, updating staff sooner as able    Progress:  1/10/2023: Met with team members. Discussed program, process, and progress. Discussed and set treatment goals. Jossy shared concerns about not having enough opportunities to learn life skills. Staff will message her directly regarding programs/providers who cover more life skill based focus and practice. Jossy was also put on the waitlist for day treatment in the event she would like more support after PHP. She will update the team with thoughts/plans day by day. Continue.  1/16/2022: Met with team members. Discussed program, process, and progress. Patient reported feeling 'nowhere' and 'overwhelmed' with increasing effective use of support or improving wellness-related behaviors. Patient reported being open to inviting someone to  Group. Discussed option for step-down (4A IOP).   1/18/23 - discharge:  Met with team members. Discussed program progress. Jossy reported it will be important for her to continue to have structure after PHP.  Jossy and her family will work on looking into DBT resources on Friday and will contact [facility] about IOP if Jossy is unable to get into DBT.  Jossy has support from her family.   Consider meeting more frequently with your individual therapist, as needed, for additional support.    Treatment Strategies:  Assist to identify treatment goals  Assist with discharge planning  Engage in safety planning when indicated  Facilitate increased self awareness  Provide education regarding eight dimensions of wellness. sleep hygiene. medication education and management. stigma. nutrition. signs and symptoms. community resources. support network education.      LIAM Navarro on 1/18/2023 at 2:45 PM      NOTE: Signatures are available on the Acknowledgement of Treatment Plan located in Chart  Review    The Individualized Treatment Plan Signature Page has been routed to the provider for co-sign.     I have reviewed the patient's Individualized Treatment Plan and agree with the current goals, interventions and level of care.     Denis Ahn MD  1/12/2023, 1/17/2023

## 2023-01-05 NOTE — GROUP NOTE
Psychoeducation Group Note    PATIENT'S NAME: Jossy Matthews  MRN:   [id number]  :   1995  ACCT. NUMBER: [id number]  DATE OF SERVICE: 23  START TIME:  2:00 PM  END TIME:  2:50 PM  FACILITATOR: Karlene Youssef RN  TOPIC:  Wellness Group: Brain Health  [facility] Adult Partial Hospitalization Program  TRACK: [facility]    NUMBER OF PARTICIPANTS: 3    Summary of Group / Topics Discussed:  Brain Health:  Pathophysiology of stress and anxiety: Patients were educated on the difference between stress, chronic stress, and anxiety. The anatomy and pathophysiology of the body/brain were reviewed to illustrate the immediate effects of stress/anxiety in the body and the long term effects and increased risks for chronic disease that come from unmanaged stress/anxiety. Self-coping strategies to manage symptoms of stress were reviewed and pharmacologic, psychotherapeutic, and complementary treatment options were discussed.    Patient Session Goals / Objectives:  ? Described the differences between stress and anxiety and how the body responds to it  ? Listed the long term effects and increased risks for chronic disease that can arise from unmanaged stress/anxiety  ? Identified and described pharmacologic, psychotherapeutic, and complementary treatment options                                    Service Modality:  Video Visit     Telemedicine Visit: The patient's condition can be safely assessed and treated via synchronous audio and visual telemedicine encounter.      Reason for Telemedicine Visit: Services only offered telehealth    Originating Site (Patient Location): Patient's home    Distant Site (Provider Location): Provider Remote Setting- Home Office    Consent:  The patient/guardian has verbally consented to: the potential risks and benefits of telemedicine (video visit) versus in person care; bill my insurance or make self-payment for services provided; and responsibility for payment of non-covered services.      Patient would like the video invitation sent by:  My Chart    Mode of Communication:  Video Conference via Medical Zoom    As the provider I attest to compliance with applicable laws and regulations related to telemedicine.                              Patient Participation / Response:  Fully participated with the group by sharing personal reflections / insights and openly received / provided feedback with other participants.    Identified / Expressed personal readiness to practice skills    Treatment Plan:  Patient has a current master individualized treatment plan.  See Epic treatment plan for more information.    Karlene Youssef RN

## 2023-01-05 NOTE — GROUP NOTE
Process Group Note    PATIENT'S NAME: Jossy Matthews  MRN:   [id number]  :   1995  ACCT. NUMBER: [id number]  DATE OF SERVICE: 23  START TIME:  9:00 AM  END TIME:  9:50 AM  FACILITATOR: Alex Taylor LMFT  TOPIC:  Process Group    Diagnoses:  Principal DSM5 Diagnoses  (Sustained by DSM5 Criteria Listed Above):   296.33 (F33.2) Major Depressive Disorder, Recurrent Episode, Severe _ and With anxious distress  300.02 (F41.1) Generalized Anxiety Disorder.      [facility] Adult Partial Hospitalization Program  TRACK: [facility]    NUMBER OF PARTICIPANTS: 3    Service Modality:  Video Visit     Telemedicine Visit: The patient's condition can be safely assessed and treated via synchronous audio and visual telemedicine encounter.      Reason for Telemedicine Visit: Services only offered telehealth    Originating Site (Patient Location): Patient's home    Distant Site (Provider Location): Provider Remote Setting- Home Office    Consent:  The patient/guardian has verbally consented to: the potential risks and benefits of telemedicine (video visit) versus in person care; bill my insurance or make self-payment for services provided; and responsibility for payment of non-covered services.     Patient would like the video invitation sent by:  Machinio and Email    Mode of Communication:  Video Conference via Medical Zoom    As the provider I attest to compliance with applicable laws and regulations related to telemedicine.            Data:    Session content: At the start of this group, patients were invited to check in by identifying themselves, describing their current emotional status, and identifying issues to address in this group.   Area(s) of treatment focus addressed in this session included Symptom Management, Personal Safety and Community Resources/Discharge Planning.  Patient reported feeling  anxious overwhelmed and frustrated due to the series of events that led me to be here.   She feels her  medications might be making her feel worse, so she is tempted not to take them.   Patient discussed working toward seeing what group is like.   For skills they will use to address their goal(s), patient identified working to learn new skills.   A barrier to working toward their goal(s) and/or addressing mental health symptoms the patient identified was  my own assumption that what ever I m doing is not going to help me.   Patient reported no safety concerns and/or self-injurious behaviors. Patient reported no substance use. Patient reported they are taking their medications as prescribed.   Patient reported feeling proud/grateful for her family and friends.  Patient discussed with the treatment group that they are  still freaking out about life.  She feels she needs life skills.    Therapeutic Interventions/Treatment Strategies:  Psychotherapist offered support, feedback and validation. Treatment modalities used include Cognitive Behavioral Therapy. Interventions include Coping Skills: Facilitated understanding of  what factors may contribute to symptom relapse and skills plan to manage symptom relapse  and Symptoms Management: Promoted understanding of their diagnoses and how it impacts their functioning.    Assessment:    Patient response:   Patient responded to session by accepting feedback, listening, focusing on goals, being attentive and accepting support    Possible barriers to participation / learning include: Severity of symptoms    Health Issues:   None reported       Substance Use Review:   Substance Use: No active concerns identified.    Mental Status/Behavioral Observations  Appearance:   Appropriate   Eye Contact:   Good   Psychomotor Behavior: Normal   Attitude:   Cooperative   Orientation:   All  Speech   Rate / Production: Normal    Volume:  Normal   Mood:    Depressed Anxious  Affect:    Subdued  Thought Content:   Clear and Safety denies any current safety concerns including suicidal ideation,  self-harm, and homicidal ideation  Thought Form:  Coherent  Logical     Insight:    Good     Suicide Risk and Safety Concerns were assessed for Jossy Matthews AC  1995.    Patient meets the following risk assessment and triage:  When the patient identifies the following:  Suicidal Ideation with intent or intent & plan  (Yes to C-SSRS Suicidal Ideation #4 and #5) in the past month     The following will be initiated:  Per clinical judgment, provider is making the following recommendations :  Safety monitoring and support in PHP  Jossy started in PHP to follow up on a suicide attempt on 22.    Plan:     Safety Plan: Safety monitoring and support in PHP    Barriers to treatment: None identified    Patient Contracts (see media tab):  None    Substance Use: Provided encouragement towards sobriety     Continue or Discharge: Patient will continue in Adult Partial Hospitalization Program (PHP)  as planned. Patient is likely to benefit from learning and using skills as they work toward the goals identified in their treatment plan.      Alex Taylor, LMFT  2023

## 2023-01-05 NOTE — GROUP NOTE
Psychoeducation Group Note    PATIENT'S NAME: Jossy Matthews  MRN:   [id number]  :   1995  ACCT. NUMBER: [id number]  DATE OF SERVICE: 23  START TIME: 10:00 AM  END TIME: 10:50 AM  FACILITATOR: Alex Taylor LMFT  TOPIC: MH Wellness Group: Mental Health Maintenance  [facility] Adult Partial Hospitalization Program  TRACK: [facility]    NUMBER OF PARTICIPANTS: 3    Summary of Group / Topics Discussed:  Mental Health Maintenance:  Vulnerability: In this group, the concept of vulnerability was explored through the viewing, discussion, and self-reflection of the Yanira Brown Talk Titled,  The Power of Vulnerability.      Patient Session Goals / Objectives:  ? Defined and described definition of vulnerability   ? Identified 2 or more ways of practicing authenticity     Service Modality:  Video Visit     Telemedicine Visit: The patient's condition can be safely assessed and treated via synchronous audio and visual telemedicine encounter.      Reason for Telemedicine Visit: Services only offered telehealth    Originating Site (Patient Location): Patient's home    Distant Site (Provider Location): Provider Remote Setting- Home Office    Consent:  The patient/guardian has verbally consented to: the potential risks and benefits of telemedicine (video visit) versus in person care; bill my insurance or make self-payment for services provided; and responsibility for payment of non-covered services.     Patient would like the video invitation sent by:  [employer] and Email    Mode of Communication:  Video Conference via Medical Zoom    As the provider I attest to compliance with applicable laws and regulations related to telemedicine.        Patient Participation / Response:  Fully participated with the group by sharing personal reflections / insights and openly received / provided feedback with other participants.    Demonstrated understanding of topics discussed through group discussion and participation,  Identified / Expressed personal readiness to practice skills and Verbalized understanding of mental health maintenance topic    Treatment Plan:  Patient has an initial individualized treatment plan that was created as part of their diagnostic assessment / admission process.  A master individualized treatment plan is in the process of being developed with the patient and multi-disciplinary care team.    Alex Taylor LMFT

## 2023-01-06 ENCOUNTER — HOSPITAL ENCOUNTER (OUTPATIENT)
Dept: BEHAVIORAL HEALTH | Facility: CLINIC | Age: 28
Discharge: HOME | End: 2023-01-06
Attending: PSYCHIATRY & NEUROLOGY
Payer: COMMERCIAL

## 2023-01-06 ENCOUNTER — TELEPHONE (OUTPATIENT)
Dept: BEHAVIORAL HEALTH | Facility: CLINIC | Age: 28
End: 2023-01-06

## 2023-01-06 DIAGNOSIS — F41.1 GAD (GENERALIZED ANXIETY DISORDER): Primary | ICD-10-CM

## 2023-01-06 DIAGNOSIS — F33.2 MAJOR DEPRESSIVE DISORDER, RECURRENT EPISODE, SEVERE WITH MIXED FEATURES (H): ICD-10-CM

## 2023-01-06 PROCEDURE — H0035 MH PARTIAL HOSP TX UNDER 24H: HCPCS | Mod: GT,95 | Performed by: OCCUPATIONAL THERAPIST

## 2023-01-06 PROCEDURE — H0035 MH PARTIAL HOSP TX UNDER 24H: HCPCS | Mod: GT,95

## 2023-01-06 PROCEDURE — 99214 OFFICE O/P EST MOD 30 MIN: CPT | Mod: GT | Performed by: PSYCHIATRY & NEUROLOGY

## 2023-01-06 PROCEDURE — H0035 MH PARTIAL HOSP TX UNDER 24H: HCPCS | Mod: GT,95 | Performed by: COUNSELOR

## 2023-01-06 NOTE — PROGRESS NOTES
"[facility] Mental Health Outpatient Programs  Provider Interval History Note    Program (track): Partial Hospital Program    PATIENT'S NAME: Jossy Matthews  MRN:   [id number]  :   1995  ACCT. NUMBER: [id number]  DATE OF SERVICE: 23  CALL/VIDEO START TIME: 1435  CALL/VIDEO END TIME: 1450      Interval History:  \"I think the gabapentin may have been what was making me feel out of it.\" Jossy presents today for follow-up and ongoing program supervision.   Endorses:    \"Heart racing really quickly, not sure if it's that or medication, or anxiety\"    Took 100 mg trazodone last night, still had difficulty sleeping    Reviewed medication options and/plan    Risk Assessment:    Suicidal ideation: none endorsed    Thoughts of non-suicidal self-injury: none endorsed    Recent self-injurious behavior: none endorsed    Homicidal ideation: none endorsed    Other safety concerns: none endorsed      Medications:  Current Outpatient Medications   Medication Sig Dispense Refill     aspirin-acetaminophen-caffeine (EXCEDRIN EXTRA STRENGTH) 250-250-65 MG tablet Take 1 tablet by mouth daily as needed for headaches       busPIRone (BUSPAR) 10 MG tablet Take 1 tablet (10 mg) by mouth 3 times daily 90 tablet 0     gabapentin (NEURONTIN) 300 MG capsule Take 1 capsule (300 mg) by mouth 3 times daily 90 capsule 0     hydrOXYzine (ATARAX) 50 MG tablet Take 1 tablet (50 mg) by mouth every 4 hours as needed for anxiety (Patient not taking: Reported on 2023) 30 tablet 0     melatonin 5 MG tablet Take 1 tablet (5 mg) by mouth nightly as needed for sleep (Patient not taking: Reported on 2023) 30 tablet 0     multivitamin w/minerals (CENTRUM ADULTS) tablet Take 1 tablet by mouth daily       norethindrone-ethinyl estradiol (JUNEL FE ) 1-20 MG-MCG tablet Take 1 tablet by mouth daily       sertraline (ZOLOFT) 100 MG tablet Take 1 tablet (100 mg) by mouth daily 30 tablet 0     " "traZODone (DESYREL) 50 MG tablet Take 1-2 tablets ( mg) by mouth nightly as needed for sleep 60 tablet 0       The above list was reviewed with patient today.     Patient is taking medications as prescribed and reports possible adverse effects as discussed above      Laboratory Results:  Most recent labs reviewed. Pertinent updates/findings: None.     Metrics:  PHQ-9 scores:   PHQ-9 SCORE 1/3/2023   PHQ-9 Total Score 16       GURU-7 scores:   GURU-7 SCORE 1/5/2023 1/5/2023 1/5/2023   Total Score - - 11 (moderate anxiety)   Total Score 11 11 11       CSSR-S: No flowsheet data found.      Mental Status Examination (limited due to video virtual visit format):  Vital Signs: There were no vitals taken for this virtual visit.  Appearance: appropriately groomed, appears stated age, and in mild distress.  Attitude: cooperative   Eye Contact: good to the extent that can be determined in a video visit  Muscle Strength and Tone: no gross abnormalities based on remote observation  Psychomotor Behavior: Appropriate and Calm; no evidence of tardive dyskinesia, dystonia, or tics based on remote observation  Gait and Station: normal, no gross abnormalities based on remote observation  Speech: clear, coherent, decreased prosody, regular rate, regular rhythm and fluent  Associations: No loosening of associations  Thought Process: coherent and goal directed  Thought Content: no evidence of suicidal ideation or homicidal ideation, no evidence of psychotic thought, no auditory hallucinations present and no visual hallucinations present  Mood: \"anxious\"  Affect: mood congruent, intensity is blunted, constricted mobility and reactive  Insight: good  Judgment: intact, adequate for safety  Impulse Control: intact  Oriented to: time, place, person and situation  Attention Span and Concentration: normal  Language: Intact  Recent and Remote Memory: intact to interview. Not formally assessed. No amnesia.  Fund of Knowledge/Assessment of " Intelligence: Average  Capacity of Activities of Daily Living: Independent, able to participate in programmatic care services.        Diagnosis/es:  1. GURU (generalized anxiety disorder)    2. Major depressive disorder, recurrent episode, severe with mixed features (H)        Assessment/Plan:  Jossy presents today for follow up. Endorses improvement in some of the more notable cognitive effects of gabapentin. Anxiety overall remains high. As discussed with patient, buspirone is likely not contributing much in the way of benefit or adverse effect at this point and we will continue it for the time being to limit the number of variables we are changing at any one time. We will proceed with increasing sertraline to 150 mg daily starting tomorrow as originally discussed. Patient advised to take up to 150 mg of trazodone if needed to help protect sleep over the weekend. No other changes to medication or to plan as outlined yesterday.      Continue therapy as planned:    Enrolled in Partial Hospital Program    Continues to benefit from services    Continues to meet criteria for this level of care    Patient would be at reasonable risk of requiring a higher level of care in the absence of current services.    I feel this patient does not meet criteria for an involuntary hold and is appropriate for treatment at an outpatient level of care.    Continue with individual therapist as appropriate    Safety plan reviewed:    To the Emergency Department as needed or call after hours crisis line at [phone] or [phone]. [place] Crisis Text Line: Text [place] to 296411 or Suicide LifeLine Chat: suicidepreventionlifeline.org/chat    Follow-up:     schedule an appointment with me or another program provider in approximately in 1 week(s) or sooner if needed.  Can speak with a staff member or call the appropriate program number (see below) to schedule    Follow up with outpatient provider(s) as planned or sooner if needed for acute  medical concerns.    Questions or concerns:    Call program line with questions or concerns (see below)    C[employer]hart may be used to communicate with your provider, but this is not intended to be used for emergencies.      [facility] Adult Mental Health Program lines:  [facility]: [phone]  Dual Disorder: [phone]  Adult Day Treatment:  [phone]  55+/Intensive Outpatient: [phone]    Community Resources:    National Suicide Prevention Lifeline: 988 from any phone, or 237-120-2676 (TTY: 956.596.6559). Call anytime for help.  (www.suicidepreventionlifeline.org)  National [place] [employer] Mental Illness (www.freya.org): 268.646.6011 or 555-106-6969.   Mental Health Association (www.mentalhealth.org): 940.716.6617 or 437-264-1313.  [place] Crisis Text Line: Text [place] to 185406  Suicide LifeLine Chat: suicideAmplify Health.org/chat    Treatment Objective(s) Addressed in This Session:  The purpose of today's virtual visit is for this writer to provide oversight of patient's care while receiving program services. Specific treatment goals addressed included personal safety, symptoms stabilization and management, wellness and mental health, and community resources/discharge planning.     This author or another program provider will follow up with the patient as noted above.     Patient agrees with the current plan of care.    Denis Ahn MD  1/06/23      Visit Details:  Type of service:  Video Visit    Start/End Time: see above    Originating Location (pt. Location): Home in [place]    Distant Location (provider location): Provider Remote Setting- Home Office    Platform used for Video Visit: Zoom    Physician has received verbal consent for a Video Visit from the patient? Yes    25 minutes spent on the date of the encounter doing chart review, patient visit, documentation and discussion with other provider(s)     This document completed in part using Dragon Medical One dictation software.  Please  excuse any inadvertent word or phrase substitutions.

## 2023-01-06 NOTE — GROUP NOTE
Psychoeducation Group Note    PATIENT'S NAME: Jossy Matthews  MRN:   [id number]  :   1995  ACCT. NUMBER: [id number]  DATE OF SERVICE: 23  START TIME: 11:00 AM  END TIME: 11:50 AM  FACILITATOR: Natalie Harper OTR/L  TOPIC:  PHP OT Group: Lifestyle Balance and Structure  [facility] [place] Adult Partial Hospitalization Program  TRACK: 1    NUMBER OF PARTICIPANTS: 4                                      Service Modality:  Video Visit     Telemedicine Visit: The patient's condition can be safely assessed and treated via synchronous audio and visual telemedicine encounter.      Reason for Telemedicine Visit: Services only offered telehealth    Originating Site (Patient Location): Patient's home    Distant Site (Provider Location): [facility] [place] Outpatient Setting: Partial [facility]    Consent:  The patient/guardian has verbally consented to: the potential risks and benefits of telemedicine (video visit) versus in person care; bill my insurance or make self-payment for services provided; and responsibility for payment of non-covered services.     Patient would like the video invitation sent by:  My Chart    Mode of Communication:  Video Conference via Medical Zoom    As the provider I attest to compliance with applicable laws and regulations related to telemedicine.     Summary of Group / Topics Discussed:  Lifestyle Balance and Structure:  OT Goal-setting & integration: Weekend Wellness and Building Interoceptive Awareness: The group focused on reflecting on the past week and identifying insights and positive experiences that they want to build upon further.  The group also focused on identifying needs and any concerns for the upcoming weekend and created a list of activities and tasks that they might engage in to help support themselves and add structure their weekend.  Facilitated the sharing of individual insights and plans with validation, support, and feedback provided.  Also provided  education on interoception and skills to build interoceptive awareness as a coping skill to better notice the way their body feels and how it is connected to emotions.     Patient Session Goals / Objectives:    Reflected on insights learned and positive experiences over the last week to help with their recovery and wellbeing    Identified needs and concerns for the upcoming weekend and identified ways to address these    Created a written list of possible ways to structure their weekend    Identified plan to support follow through on plans and reflection on progress made     Learn about interoception and ways to build interoceptive awareness        Patient Participation / Response:  Fully participated with the group by sharing personal reflections / insights and openly received / provided feedback with other participants.    Patient presentation: observed to be a little restless in session; had difficulty identify any plans for the weekend; seemed open to idea of creating a list of possibilities/options; working on identifying/practicing more coping skills, Verbalized understanding of content and Patient would benefit from additional opportunities to practice the content to be able to generalize it to their everyday life with increased intentionality, consistency, and efficacy in support of their psychiatric recovery    Treatment Plan:  Patient has an initial individualized treatment plan that was created as part of their diagnostic assessment / admission process.  A master individualized treatment plan is in the process of being developed with the patient and multi-disciplinary care team.  Continue to assess.  Will complete OT assessment with Jossy in the next couple of treatment days.      ANT Dunn/BRITTANY

## 2023-01-06 NOTE — GROUP NOTE
Psychotherapy Group Note    PATIENT'S NAME: Jossy Matthews  MRN:   [id number]  :   1995  ACCT. NUMBER: [id number]  DATE OF SERVICE: 23  START TIME:  1:00 PM  END TIME:  1:50 PM  FACILITATOR: Alex Taylor LMFT  TOPIC:  EBP Group: Specialty Awareness  [facility] Adult Partial Hospitalization Program  TRACK: [facility]    NUMBER OF PARTICIPANTS: 4    Summary of Group / Topics Discussed:  Specialty Topics: Hope: The topic of hope was presented in order to help patients better understand the symptoms of hopelessness and how to become more hopeful. Patients discussed their current awareness of the topic and relevance to their functioning. Individual experiences with symptoms and treatment options were also discussed. Patients explored options for ongoing/future treatment and symptom management.      Patient Session Goals / Objectives:    Discussed definition of hopelessness    Discussed how hopelessness impacts functioning    Set a plan to utilize skills to reduce hopelessness    Service Modality:  Video Visit     Telemedicine Visit: The patient's condition can be safely assessed and treated via synchronous audio and visual telemedicine encounter.      Reason for Telemedicine Visit: Services only offered telehealth    Originating Site (Patient Location): Patient's home    Distant Site (Provider Location): Provider Remote Setting- Home Office    Consent:  The patient/guardian has verbally consented to: the potential risks and benefits of telemedicine (video visit) versus in person care; bill my insurance or make self-payment for services provided; and responsibility for payment of non-covered services.     Patient would like the video invitation sent by:  [employer] and Email    Mode of Communication:  Video Conference via Medical Zoom    As the provider I attest to compliance with applicable laws and regulations related to telemedicine.      Patient Participation / Response:  Fully participated with the  group by sharing personal reflections / insights and openly received / provided feedback with other participants.    Demonstrated understanding of topics discussed through group discussion and participation, Identified / Expressed readiness to act on skill suggestions discussed in topic, Verbalized understanding of ways to proactively manage illness and Practiced skills in session    Treatment Plan:  Patient has an initial individualized treatment plan that was created as part of their diagnostic assessment / admission process.  A master individualized treatment plan is in the process of being developed with the patient and multi-disciplinary care team.    Alex Taylor LMFT

## 2023-01-06 NOTE — GROUP NOTE
Psychoeducation Group Note    PATIENT'S NAME: Jossy Matthews  MRN:   [id number]  :   1995  ACCT. NUMBER: [id number]  DATE OF SERVICE: 23  START TIME:  2:00 PM  END TIME:  2:50 PM  FACILITATOR: Ra Lopez RN  TOPIC:  Wellness Group: Medication Education and Management                                    Service Modality:  Video Visit     Telemedicine Visit: The patient's condition can be safely assessed and treated via synchronous audio and visual telemedicine encounter.      Reason for Telemedicine Visit: Services only offered telehealth    Originating Site (Patient Location): Patient's home    Distant Site (Provider Location): Provider Remote Setting- Home Office    Consent:  The patient/guardian has verbally consented to: the potential risks and benefits of telemedicine (video visit) versus in person care; bill my insurance or make self-payment for services provided; and responsibility for payment of non-covered services.     Patient would like the video invitation sent by:  My Chart    Mode of Communication:  Video Conference via Medical Zoom    As the provider I attest to compliance with applicable laws and regulations related to telemedicine.                           [facility] Adult Partial Hospitalization Program  TRACK: [facility]    NUMBER OF PARTICIPANTS: 3    Summary of Group / Topics Discussed:  Medication Educations and Management:  Medication Categories: Patient were provided with a brief overview of four major psychotropic medication categories. Expected effects, potential side effects, adverse reactions, and contraindications were discussed. Patients learned about how their medications work to treat their illness and reviewed safe medication management, handling, and disposal of medications.     Patient Session Goals / Objectives:  ? Listed the four major categories of psychotropic medications (antidepressants, antipsychotics, mood stabilizers, anti-anxiety)  ? Identified  medications in each category and important adverse reactions/contraindications for use  ? Explained how the medications they take work to treat their illness       Patient Participation / Response:  Fully participated with the group by sharing personal reflections / insights and openly received / provided feedback with other participants.     Demonstrated understanding of topics discussed through group discussion and participation    Treatment Plan:  Patient has an initial individualized treatment plan that was created as part of their diagnostic assessment / admission process.  A master individualized treatment plan is in the process of being developed with the patient and multi-disciplinary care team.    Ra Lopez RN

## 2023-01-06 NOTE — GROUP NOTE
Psychotherapy Group Note    PATIENT'S NAME: Jossy Matthews  MRN:   [id number]  :   1995  ACCT. NUMBER: [id number]  DATE OF SERVICE: 23  START TIME: 10:00 AM  END TIME: 10:50 AM  FACILITATOR: Gail Grullon LMFT  TOPIC:  EBP Group: Emotions Management  [facility] Adult Partial Hospitalization Program  TRACK: 1    NUMBER OF PARTICIPANTS: 4    Summary of Group / Topics Discussed:  Emotions Management: Guilt and Shame: Patients explored and shared personal experiences associated with feelings of guilt and shame.  Group explored how these feelings develop, what they mean to each individual, and how to increase acceptance and usefulness of these feelings.  Group members assisted one another to contextualize these concepts and promote healing.     Patient Session Goals / Objectives:    Discuss and review definitions and personal views/experiences with guilt and shame    Understand the differences between guilt and shame    Explore how feelings of guilt and shame impact functioning    Understand and practice strategies to manage difficult emotions and move towards healing    Understand and normalize difficult emotions through group discussion    Understand the utility of guilt and shame    Target  unwanted  emotions for change                                      Service Modality:  Video Visit     Telemedicine Visit: The patient's condition can be safely assessed and treated via synchronous audio and visual telemedicine encounter.      Reason for Telemedicine Visit: Patient convenience (e.g. access to timely appointments / distance to available provider), Patient required immediate assessment / treatment , and Services only offered telehealth    Originating Site (Patient Location): Patient's home    Distant Site (Provider Location): Provider Remote Setting- Home Office    Consent:  The patient/guardian has verbally consented to: the potential risks and benefits of telemedicine (video visit) versus  in person care; bill my insurance or make self-payment for services provided; and responsibility for payment of non-covered services.     Patient would like the video invitation sent by:  My Chart    Mode of Communication:  Video Conference via Medical Zoom    As the provider I attest to compliance with applicable laws and regulations related to telemedicine.                               Patient Participation / Response:  Moderately participated, sharing some personal reflections / insights and adequately adequately received / provided feedback with other participants.    Demonstrated understanding of topics discussed through group discussion and participation, Expressed understanding of the relevance / importance of emotions management skills at distressing times in life and Self-aware of experiences with difficult emotions, and strategies to employ to manage them    Treatment Plan:  Patient has an initial individualized treatment plan that was created as part of their diagnostic assessment / admission process.  A master individualized treatment plan is in the process of being developed with the patient and multi-disciplinary care team.    LIAM Blake

## 2023-01-06 NOTE — TELEPHONE ENCOUNTER
Pt asked group therapist if they could speak with the provider about recent medication changes. Pt stated on 1/3/2023 their OP started on Gabapentin 300mg TID, buspar 10mg TID. About 20-30 mins after taking their medications they began to feel off/tired/out of it/weird in their head. They discussed this with Dr. Ahn and agreed to discontinue taking gabapentin 300mg TID. Since that time they continue to feel off/tired. Feels their anxiety has increased, increased HR and had a hard time sleeping last night. They are asking if this could still be related to the gabapentin or something else.

## 2023-01-06 NOTE — PROGRESS NOTES
[facility] Adult Partial Hospitalization Program  TRACK: 1    PATIENT'S NAME: Jossy Matthews  MRN:   [id number]  :   1995  ACCT. NUMBER: [id number]  DATE OF SERVICE: 23  START TIME: 9:33, note no charge for this hour, patient was on and off screen and unresponsive for part of the group, required staff phone call to re-engage in group  END TIME: 9:50  Service modality:  Video Visit:      Provider verified identity through the following two step process.  Patient provided:  Patient was verified at admission/transfer    Telemedicine Visit: The patient's condition can be safely assessed and treated via synchronous audio and visual telemedicine encounter.      Reason for Telemedicine Visit: Patient convenience (e.g. access to timely appointments / distance to available provider), Patient required immediate assessment / treatment  and Services only offered telehealth    Originating Site (Patient Location): Patient's home    Distant Site (Provider Location): Provider Remote Setting- Home Office    Consent:  The patient/guardian has verbally consented to: the potential risks and benefits of telemedicine (video visit) versus in person care; bill my insurance or make self-payment for services provided; and responsibility for payment of non-covered services.     Patient would like the video invitation sent by:  My Chart    Mode of Communication:  Video Conference via Medical Zoom    Distant Location (Provider):  Off-site    As the provider I attest to compliance with applicable laws and regulations related to telemedicine.    PROVIDER OVERSIGHT: Denis Ahn MD  NUMBER OF PARTICIPANTS: 4    Diagnoses:    296.23 (F32.2) Major Depressive Disorder, Single Episode, Severe _ and With anxious distress  300.02 (F41.1) Generalized Anxiety Disorder.    Data:    Session content: At the start of this group, patients were invited to check in by identifying themselves, describing their current emotional status,  "and identifying issues to address in this group.   Area(s) of treatment focus addressed in this session included Symptom Management, Personal Safety and Community Resources/Discharge Planning.    Jossy reported feeling  super panicky and anxious today, my heart rate is going pretty fast.  Endorses poor sleep. Patient identified the following goal(s) to work towards today:  I don t know,  suggested focusing on reducing anxiety. Patient plans to use the following skills to achieve their goal:  I don t know,  able to identify where the anxiety is in the body, the group offered suggestions with breathing with progressive muscle relaxation. Denies safety concerns  just freaking out internally , denies chemical use, and reports taking their medications as prescribed. Patient reports feeling proud of/grateful for:  my family and friends.  Patient asked for the following supports from the group today:  I don t know.     Therapeutic Interventions/Treatment Strategies:  Psychotherapist offered support, feedback and validation, set limits, provided redirection and reinforced use of skills. Treatment modalities used include Motivational Interviewing, Cognitive Behavioral Therapy and Dialectical Behavioral Therapy. Interventions include Coping Skills: Discussed use of self-soothe skills to decrease distress in the body, Assisted patient in identifying 1-2 healthy distraction skills to reduce overall distress, Discussed how the use of intentional \"in the moment\" actions can help reduce distress and Promoted understanding of how and when to apply grounding strategies to reduce distress and increase presence in the moment.    Assessment:    Patient response:   Patient responded to session by accepting feedback, giving feedback, listening, appearing disengaged and appearing distracted    Possible barriers to participation / learning include: tardiness, unresponsive to treatment strategies and and no barriers identified    Health " Issues:   None reported       Substance Use Review:   Substance Use: No active concerns identified.    Mental Status/Behavioral Observations  Appearance:   Appropriate   Eye Contact:   Fair   Psychomotor Behavior: Normal  Restless   Attitude:   Cooperative  Pleasant  Orientation:   All  Speech   Rate / Production: Normal/ Responsive Pressured  Normal    Volume:  Normal   Mood:    Anxious  Depressed  Normal  Affect:    Appropriate  Worrisome   Thought Content:   Rumination and Safety denies any current safety concerns including suicidal ideation, self-harm, and homicidal ideation  Thought Form:  Coherent  Logical     Insight:    Fair     Plan:     Safety Plan: Committed to safety and agreed to follow previously developed safety coping plan.      Barriers to treatment: None identified    Patient Contracts (see media tab):  None    Substance Use: Not addressed in session     Continue or Discharge: Patient will continue in Adult Partial Hospitalization Program (PHP)  as planned. Patient is likely to benefit from learning and using skills as they work toward the goals identified in their treatment plan.      LIAM Blake  January 6, 2023

## 2023-01-06 NOTE — TELEPHONE ENCOUNTER
Patient seen during programming today. See progress note from today's encounter for more information.

## 2023-01-09 ENCOUNTER — HOSPITAL ENCOUNTER (OUTPATIENT)
Dept: BEHAVIORAL HEALTH | Facility: CLINIC | Age: 28
Discharge: HOME | End: 2023-01-09
Attending: PSYCHIATRY & NEUROLOGY
Payer: COMMERCIAL

## 2023-01-09 DIAGNOSIS — F33.2 MAJOR DEPRESSIVE DISORDER, RECURRENT EPISODE, SEVERE WITH MIXED FEATURES (H): Primary | ICD-10-CM

## 2023-01-09 DIAGNOSIS — F41.1 GAD (GENERALIZED ANXIETY DISORDER): ICD-10-CM

## 2023-01-09 PROCEDURE — H0035 MH PARTIAL HOSP TX UNDER 24H: HCPCS | Mod: GT,95

## 2023-01-09 PROCEDURE — 99215 OFFICE O/P EST HI 40 MIN: CPT | Mod: GT | Performed by: PSYCHIATRY & NEUROLOGY

## 2023-01-09 PROCEDURE — H0035 MH PARTIAL HOSP TX UNDER 24H: HCPCS | Mod: GT,95 | Performed by: COUNSELOR

## 2023-01-09 NOTE — GROUP NOTE
Psychotherapy Group Note    PATIENT'S NAME: Jossy Matthews  MRN:   [id number]  :   1995  ACCT. NUMBER: [id number]  DATE OF SERVICE: 23  START TIME:  1:00 PM  END TIME:  1:50 PM  FACILITATOR: Alex Taylor LMFT  TOPIC:  EBP Group: Emotions Management  [facility] Adult Partial Hospitalization Program  TRACK: [facility]    NUMBER OF PARTICIPANTS: 6    Summary of Group / Topics Discussed:  Emotions Management: Model of Emotions: Patients were introduced to the cyclical model of emotions.  Explored emotions are shaped by different events and one s interpretation of events.  Group discussed how emotions begin with an event, followed by one s interpretation, followed by associated feelings.  Discussion included a review of personal urges and actions that can/do follow an emotional experience in the patient s life, and the end results.    Patient Session Goals / Objectives:    Demonstrate understanding of types various emotions.    Identify and discuss specific emotions and when they occur; understand triggers.    Identify individual emotions and physical sensations that accompany them.    Discuss urges and actions, and how to influence the intensity of emotional reactions and disrupt the cycle.      Discuss barriers to emotional regulation.    Choose 1-2 strategies to assist with emotional response to potentially distressing situations.    Service Modality:  Video Visit     Telemedicine Visit: The patient's condition can be safely assessed and treated via synchronous audio and visual telemedicine encounter.      Reason for Telemedicine Visit: Services only offered telehealth    Originating Site (Patient Location): Patient's home    Distant Site (Provider Location): Provider Remote Setting- Home Office    Consent:  The patient/guardian has verbally consented to: the potential risks and benefits of telemedicine (video visit) versus in person care; bill my insurance or make self-payment for services  provided; and responsibility for payment of non-covered services.     Patient would like the video invitation sent by:  "[employer]" and Email    Mode of Communication:  Video Conference via Medical Zoom    As the provider I attest to compliance with applicable laws and regulations related to telemedicine.      Patient Participation / Response:  Moderately participated, sharing some personal reflections / insights and adequately adequately received / provided feedback with other participants.  Jossy reported understanding how she experiences the model of emotions cycle but declined to share details about it at this.   Demonstrated understanding of topics discussed through group discussion and participation and Expressed understanding of the relevance / importance of emotions management skills at distressing times in life    Treatment Plan:  Patient has an initial individualized treatment plan that was created as part of their diagnostic assessment / admission process.  A master individualized treatment plan is in the process of being developed with the patient and multi-disciplinary care team.    Alex Taylor LMFT

## 2023-01-09 NOTE — GROUP NOTE
Psychotherapy Group Note    PATIENT'S NAME: Jossy Matthews  MRN:   [id number]  :   1995  ACCT. NUMBER: [id number]  DATE OF SERVICE: 23  START TIME: 10:00 AM  END TIME: 10:50 AM  FACILITATOR: Radha Solano LPCC  TOPIC: MH EBP Group: Cognitive Restructuring  [facility] Adult Partial Hospitalization Program  TRACK: [facility]    NUMBER OF PARTICIPANTS: 4    Summary of Group / Topics Discussed:  Cognitive Restructuring: Distortions: Patients received an overview of how to identify common cognitive distortions. Patients will explore alternatives to cognitive distortions and practice challenging their negative thought patterns. The goal is to help patients target modify ineffective thought patterns.     Patient Session Goals / Objectives:    Familiarized self with ineffective / unhealthy thoughts and how they develop.      Explored impact of ineffective thoughts / distortions on mood and activity    Formulated new neutral/positive alternatives to challenge less helpful / ineffective thoughts.    Practiced and plan to apply in daily life                                    Service Modality:  Video Visit     Telemedicine Visit: The patient's condition can be safely assessed and treated via synchronous audio and visual telemedicine encounter.      Reason for Telemedicine Visit: Services only offered telehealth    Originating Site (Patient Location): Patient's home    Distant Site (Provider Location): Provider Remote Setting- Home Office    Consent:  The patient/guardian has verbally consented to: the potential risks and benefits of telemedicine (video visit) versus in person care; bill my insurance or make self-payment for services provided; and responsibility for payment of non-covered services.     Patient would like the video invitation sent by:  My Chart and email    Mode of Communication:  Video Conference via Medical Zoom    As the provider I attest to compliance with applicable laws and regulations  related to telemedicine.                                        Patient Participation / Response:  Fully participated with the group by sharing personal reflections / insights and openly received / provided feedback with other participants.    Demonstrated understanding of topics discussed through group discussion and participation, Expressed understanding of the relationship between behaviors, thoughts, and feelings, Demonstrated knowledge of personal thought patterns and how they impact their mood and behavior. and Practiced skills in session    Treatment Plan:  Patient has an initial individualized treatment plan that was created as part of their diagnostic assessment / admission process.  A master individualized treatment plan is in the process of being developed with the patient and multi-disciplinary care team.    Radha Solano, formerly [facility]

## 2023-01-09 NOTE — GROUP NOTE
Psychoeducation Group Note    PATIENT'S NAME: Jossy Matthews  MRN:   [id number]  :   1995  ACCT. NUMBER: [id number]  DATE OF SERVICE: 23  START TIME:  2:00 PM  END TIME:  2:50 PM  FACILITATOR: Karlene Youssef RN  TOPIC:  Wellness Group: Mental Health Maintenance  [facility] Adult Partial Hospitalization Program  TRACK: [facility]    NUMBER OF PARTICIPANTS: 6    Summary of Group / Topics Discussed:  Mental Health Maintenance:  Assessments of Strengths: Patients completed a self-reflection on personal strengths worksheet. The concept of personal strength as it relates to resilience were explored. Patients shared responses with the group and participated in discussion.     Patient Session Goals / Objectives:  ? Patients identified 1-3 qualities they consider a personal strength.  ? Patients understood the concept of personal strengths and the connection it has to resiliency                                    Service Modality:  Video Visit     Telemedicine Visit: The patient's condition can be safely assessed and treated via synchronous audio and visual telemedicine encounter.      Reason for Telemedicine Visit: Services only offered telehealth    Originating Site (Patient Location): Patient's home    Distant Site (Provider Location): Provider Remote Setting- Home Office    Consent:  The patient/guardian has verbally consented to: the potential risks and benefits of telemedicine (video visit) versus in person care; bill my insurance or make self-payment for services provided; and responsibility for payment of non-covered services.     Patient would like the video invitation sent by:  My Chart    Mode of Communication:  Video Conference via Medical Zoom    As the provider I attest to compliance with applicable laws and regulations related to telemedicine.                                Patient Participation / Response:  Moderately participated, sharing some personal reflections / insights and adequately  adequately received / provided feedback with other participants.    Identified / Expressed personal readiness to practice skills    Treatment Plan:  Patient has a current master individualized treatment plan.  See Epic treatment plan for more information.    Karlene Youssef RN

## 2023-01-09 NOTE — GROUP NOTE
Psychoeducation Group Note    PATIENT'S NAME: Jossy Matthews  MRN:   [id number]  :   1995  ACCT. NUMBER: [id number]  DATE OF SERVICE: 23  START TIME: 11:00 AM  END TIME: 11:50 AM  FACILITATOR: Annel Dobbs OTR/L  TOPIC:  PHP OT Group: Lifestyle Balance and Structure  [facility] Adult Partial Hospitalization Program  TRACK: [facility]    NUMBER OF PARTICIPANTS: 5    Service Modality:  Video Visit     Telemedicine Visit: The patient's condition can be safely assessed and treated via synchronous audio and visual telemedicine encounter.      Reason for Telemedicine Visit: Services only offered telehealth    Originating Site (Patient Location): Patient's home    Distant Site (Provider Location): Provider Remote Setting- Home Office    Consent:  The patient/guardian has verbally consented to: the potential risks and benefits of telemedicine (video visit) versus in person care; bill my insurance or make self-payment for services provided; and responsibility for payment of non-covered services.     Patient would like the video invitation sent by:  My Chart    Mode of Communication:  Video Conference via Medical Zoom    As the provider I attest to compliance with applicable laws and regulations related to telemedicine.       Summary of Group / Topics Discussed:  Lifestyle Balance and Structure - Leisure Experiential: Provided psychoeducation and discussion on benefits of leisure on stress management, parasympathetic NS activation, and wellness. Provided skilled facilitation and clinical observation of patients in context during a leisure experiential designed to provide patients the opportunity to have a social experience as an opportunity to gain self-awareness, build cohesion and social skills, self-monitor personal performance skills, and identify the benefits of activity on their nervous system. Facilitated reflection on the meaning of leisure for participants and barriers to participating in leisure to  "support recovery.      Patient Session Goals / Objectives:    Learn through an experiential intervention activity the benefits of leisure    [place] the mechanisms and benefits of leisure activity to create lifestyle balance and improve mental health    Identified past, present, and future leisure activities that demonstrate a connection to their leisure values    Identify first step to engaging in a new or old leisure activity again and problem solve barriers      Patient Participation / Response:  Fully participated with the group by sharing personal reflections / insights and openly received / provided feedback with other participants.    Patient presentation:   congruent, demonstrated understanding of topic through discussion and participation in activities. Shared, \"hiking,\" as an interest.     Treatment Plan:  Patient has a current master individualized treatment plan.  See Epic treatment plan for more information.    Annel Dobbs, OTR/L      "

## 2023-01-09 NOTE — PROGRESS NOTES
"[facility] Mental Health Outpatient Programs  Provider Interval History Note    Program (track): [facility] Program (track 1)    PATIENT'S NAME: Jossy Matthews  MRN:   [id number]  :   1995  ACCT. NUMBER: [id number]  DATE OF SERVICE: 23  CALL/VIDEO START TIME: 1405  CALL/VIDEO END TIME: 1439      Interval History:  \"I feel pretty poopy.\" Jossy presents today for follow-up and ongoing program supervision.   Endorses:    \"Poopy like... just depressed, anxious, not changing much\"    Ongoing high level of anxiety present upon waking and persisting through the day    \"Just feeling like shit, mentally, physically\"    Noticing persistent headache and not sure whether to attribute this to medication or general and activity related to depression    Symptoms:    Sleeping with help of trazodone and melatonin  o Still waking during the night , hard to go back to sleep    \"Waking up not wanting to exist\"    \"Feeling very overwhelmed\"    \"Feel like I can't survive [on my own]\"    \"Still feel pretty obsessively down\"    Again endorsing that current state of affairs is not sustainable and denying active suicidal ideation    Substance use:    None endorsed    Risk Assessment:    Suicidal ideation: has passive suicidal thoughts described as noted above    Thoughts of non-suicidal self-injury: none endorsed    Recent self-injurious behavior: none endorsed    Homicidal ideation: none endorsed    Other safety concerns: none endorsed      Medications:  Current Outpatient Medications   Medication Sig Dispense Refill     aspirin-acetaminophen-caffeine (EXCEDRIN EXTRA STRENGTH) 250-250-65 MG tablet Take 1 tablet by mouth daily as needed for headaches       busPIRone (BUSPAR) 10 MG tablet Take 1 tablet (10 mg) by mouth 3 times daily 90 tablet 0     gabapentin (NEURONTIN) 300 MG capsule Take 1 capsule (300 mg) by mouth 3 times daily (Patient not taking: Reported on 2023) 90 " capsule 0     hydrOXYzine (ATARAX) 50 MG tablet Take 1 tablet (50 mg) by mouth every 4 hours as needed for anxiety (Patient not taking: Reported on 1/5/2023) 30 tablet 0     melatonin 5 MG tablet Take 1 tablet (5 mg) by mouth nightly as needed for sleep (Patient not taking: Reported on 1/5/2023) 30 tablet 0     multivitamin w/minerals (CENTRUM ADULTS) tablet Take 1 tablet by mouth daily       norethindrone-ethinyl estradiol (JUNEL FE 1/20) 1-20 MG-MCG tablet Take 1 tablet by mouth daily       sertraline (ZOLOFT) 100 MG tablet Take 1 tablet (100 mg) by mouth daily 30 tablet 0     traZODone (DESYREL) 50 MG tablet Take 1-2 tablets ( mg) by mouth nightly as needed for sleep 60 tablet 0       The above list was reviewed with patient today.     Patient is taking medications as prescribed and reports possible adverse effects as noted above      Laboratory Results:  Most recent labs reviewed. Pertinent updates/findings: None.     Metrics:  PHQ-9 scores:   PHQ-9 SCORE 1/3/2023   PHQ-9 Total Score 16       GURU-7 scores:   GURU-7 SCORE 1/5/2023 1/5/2023 1/5/2023   Total Score - - 11 (moderate anxiety)   Total Score 11 11 11       CSSR-S: No flowsheet data found.      Mental Status Examination (limited due to video virtual visit format):  Vital Signs: There were no vitals taken for this virtual visit.  Appearance: appropriately groomed, appears stated age, and in mild distress.  Attitude: cooperative   Eye Contact: good to the extent that can be determined in a video visit  Muscle Strength and Tone: no gross abnormalities based on remote observation  Psychomotor Behavior: Appropriate and Fidgety; no evidence of tardive dyskinesia, dystonia, or tics based on remote observation  Gait and Station: normal, no gross abnormalities based on remote observation  Speech: clear, coherent, decreased prosody, regular rate, regular rhythm and fluent  Associations: No loosening of associations  Thought Process: coherent and goal  "directed  Thought Content: no evidence of psychotic thought, passive suicidal ideation present, no auditory hallucinations present and no visual hallucinations present  Mood: \"anxious and depressed\"  Affect: mood congruent, intensity is normal, constricted mobility, restricted range and reactive  Insight: good  Judgment: intact, adequate for safety  Impulse Control: intact  Oriented to: time, place, person and situation  Attention Span and Concentration: normal  Language: Intact  Recent and Remote Memory: intact to interview. Not formally assessed. No amnesia.  Fund of Knowledge/Assessment of Intelligence: Average  Capacity of Activities of Daily Living: Independent, able to participate in programmatic care services.        Diagnosis/es:  1. Major depressive disorder, recurrent episode, severe with mixed features (H)    2. GURU (generalized anxiety disorder)        Assessment/Plan:  Jossy presents today for follow up. Endorses resolution of previously reported foggy headedness since discontinuing gabapentin. Not noting increase in anxiety or other discontinuation effects. Patient continues to express doubt about the efficacy of buspirone and also is not endorsing any specific adverse effects to it.    Recalled discussion with inpatient provider who had recommended not returning to sertraline since it clearly had not worked. In my discussion with patient it appears that anxiety was significantly worse when changing from sertraline to escitalopram and that the patient's focus was more on life changes rather than medication changes in our initial discussion. Plan remains: we will titrate sertraline back to previous dose of 200 mg by mouth daily and then reassess. Patient not endorsing additional difficulty increasing sertraline over the weekend and will now have been on current dose for 3 days as of today. Given that this medication was previously tolerated and given severity of symptoms, we will increase dose again to " 200 mg by mouth daily starting tomorrow morning.    Patient may benefit from the additional anxiolytic effect of buspirone and so we will not discontinue that medication at this time.    Also discussed next steps in treatment following the partial hospital program. Patient expressed an interest in referral to intensive outpatient and will continue to consider whether this is her preferred course of action.    Also reviewed and completed [facility] paperwork as I do not advise patient attempt to work at this time.    Patient recently discharged from the inpatient unit and continues to endorse passive suicidal ideation. I do not believe that inpatient hospitalization is necessary but I do believe that safety concerns warrant this level of care.  (F33.2) Major depressive disorder, recurrent episode, severe with mixed features (H)  (primary encounter diagnosis)  Comment: still quite symptomatic, not yet at prehospital baseline much less desired outcome  Plan:     Increase sertraline to 200 mg by mouth daily effective tomorrow    Continue buspirone at current dose    Continue partial hospital program    Anticipate stepdown to intensive outpatient upon completion of our program    (F41.1) GURU (generalized anxiety disorder)  Comment: see above  Plan: see above    Continue all other medications as reviewed per electronic medical record today    Continue therapy as planned:    Enrolled in partial hospital program    Continues to benefit from services    Continues to meet criteria for this level of care    Patient would be at reasonable risk of requiring a higher level of care in the absence of current services.    I feel this patient does not meet criteria for an involuntary hold and is appropriate for treatment at an outpatient level of care.    Continue with individual therapist as appropriate    Safety plan reviewed:    To the Emergency Department as needed or call after hours crisis line at [phone] or [phone].  [place] Crisis Text Line: Text [place] to 861437 or Suicide LifeLine Chat: suicidepreAirCast Mobile.org/chat    Follow-up:     schedule an appointment with me or another program provider in approximately in 1 week(s) or sooner if needed.  Can speak with a staff member or call the appropriate program number (see below) to schedule    Follow up with outpatient provider(s) as planned or sooner if needed for acute medical concerns.    Questions or concerns:    Call program line with questions or concerns (see below)    Home Inventory S[pecialistshart may be used to communicate with your provider, but this is not intended to be used for emergencies.      [facility] Adult Mental Health Program lines:  [facility]: [phone]  Dual Disorder: [phone]  Adult Day Treatment:  [phone]  55+/Intensive Outpatient: [phone]    Community Resources:    National Suicide Prevention Lifeline: 988 from any phone, or 771-262-1617 (TTY: 221.710.8709). Call anytime for help.  (www.suicidepreventionMindSnacksline.org)  National [place] on Mental Illness (www.freya.org): 729.407.6125 or 026-847-9585.   Mental Health Association (www.mentalhealth.org): 201.277.7613 or 756-558-4524.  [place] Crisis Text Line: Text [place] to 659726  Suicide LifeLine Chat: suicidepreAirCast Mobile.org/GeoMe    Treatment Objective(s) Addressed in This Session:  The purpose of today's virtual visit is for this writer to provide oversight of patient's care while receiving program services. Specific treatment goals addressed included personal safety, symptoms stabilization and management, wellness and mental health, and community resources/discharge planning.     This author or another program provider will follow up with the patient as noted above.     Patient agrees with the current plan of care.    Denis Ahn MD  1/09/23      Visit Details:  Type of service:  Video Visit    Start/End Time: see above    Originating Location (pt. Location): Home in [facility]  Location (provider location): [facility] Outpatient Setting: [facility] outpatient [facility] care offices    Platform used for Video Visit: Zoom    Physician has received verbal consent for a Video Visit from the patient? Yes    65 minutes spent on the date of the encounter doing chart review, patient visit, documentation, discussion with other provider(s) and completion of FMLA paperwork     This document completed in part using Dragon Medical One dictation software.  Please excuse any inadvertent word or phrase substitutions.

## 2023-01-09 NOTE — GROUP NOTE
Process Group Note    PATIENT'S NAME: Jossy Matthews  MRN:   [id number]  :   1995  ACCT. NUMBER: [id number]  DATE OF SERVICE: 23  START TIME:  9:00 AM  END TIME:  9:50 AM  FACILITATOR: Gail Grullon LMFT  TOPIC:  Process Group    Diagnoses:    296.23 (F32.2) Major Depressive Disorder, Single Episode, Severe With anxious distress  300.02 (F41.1) Generalized Anxiety Disorder.    [facility] Adult Partial Hospitalization Program  TRACK: 1    NUMBER OF PARTICIPANTS: 5                                      Service Modality:  Video Visit     Telemedicine Visit: The patient's condition can be safely assessed and treated via synchronous audio and visual telemedicine encounter.      Reason for Telemedicine Visit: Patient convenience (e.g. access to timely appointments / distance to available provider), Patient required immediate assessment / treatment , and Services only offered telehealth    Originating Site (Patient Location): Patient's home    Distant Site (Provider Location): Provider Remote Setting- Home Office    Consent:  The patient/guardian has verbally consented to: the potential risks and benefits of telemedicine (video visit) versus in person care; bill my insurance or make self-payment for services provided; and responsibility for payment of non-covered services.     Patient would like the video invitation sent by:  My Chart    Mode of Communication:  Video Conference via Medical Zoom    As the provider I attest to compliance with applicable laws and regulations related to telemedicine.                               Data:    Session content: At the start of this group, patients were invited to check in by identifying themselves, describing their current emotional status, and identifying issues to address in this group.   Area(s) of treatment focus addressed in this session included Symptom Management, Personal Safety and Community Resources/Discharge Planning.    Jossy reported feeling  " overwhelmed again or still  today. Patient identified the following goal(s) to work towards today:  go on a walk.  Patient plans to use the following skills to achieve their goal:  opposite action.  Patient anticipates the following barriers that may interfere with achieving their goal:  myself, my own mind.  Endorses safety concerns,  I keep waking up and feeling like I don t want to exist really badly,  reports that it is difficult to think of ways to cope in the moment. Denies plan/means/intent. Received suggestions from the group on things to try, including self-affirmations, music, engaging with supports including pets. Encouraged intentional use of safety plan. Denies chemical use, and reports taking their medications as prescribed. Patient reports feeling proud of/grateful for:  my friends and family.  Patient asked for the following supports from the group today:  I don t know.      Therapeutic Interventions/Treatment Strategies:  Psychotherapist offered support, feedback and validation, set limits, provided redirection and reinforced use of skills. Treatment modalities used include Motivational Interviewing, Cognitive Behavioral Therapy and Dialectical Behavioral Therapy. Interventions include Coping Skills: Assisted patient in identifying 1-2 healthy distraction skills to reduce overall distress, Discussed how the use of intentional \"in the moment\" actions can help reduce distress and Assisted patient in understanding the purpose of planning / creating / participating / sharing in positive experiences and Emotions Management:  Reviewed opposite action skill.    Assessment:    Patient response:   Patient responded to session by accepting feedback, giving feedback, listening and accepting support    Possible barriers to participation / learning include: unresponsive to treatment strategies and and no barriers identified    Health Issues:   None reported       Substance Use Review:   Substance Use: No active " concerns identified.    Mental Status/Behavioral Observations  Appearance:   Appropriate   Eye Contact:   Fair   Psychomotor Behavior: Normal   Attitude:   Cooperative  Friendly  Orientation:   All  Speech   Rate / Production: Normal/ Responsive Monotone  Normal    Volume:  Normal   Mood:    Anxious  Depressed  Normal Sad   Affect:    Appropriate  Worrisome   Thought Content:   Rumination and Safety reports  presence of suicidal ideation passive suicidal ideation   Thought Form:  Coherent  Logical     Insight:    Good  and Fair     Plan:     Safety Plan: Committed to safety and agreed to follow previously developed safety coping plan.      Barriers to treatment: None identified    Patient Contracts (see media tab):  None    Substance Use: Not addressed in session     Continue or Discharge: Patient will continue in Adult Partial Hospitalization Program (PHP)  as planned. Patient is likely to benefit from learning and using skills as they work toward the goals identified in their treatment plan.      Gail Grullon, LIAM  January 9, 2023

## 2023-01-10 ENCOUNTER — TELEPHONE (OUTPATIENT)
Dept: BEHAVIORAL HEALTH | Facility: CLINIC | Age: 28
End: 2023-01-10

## 2023-01-10 NOTE — PROGRESS NOTES
Partial Hospitalization Program  Occupational Therapy Evaluation     Patient: Jossy Matthews  MRN: [id number]  : 1995  Age: 27 year old  Sex: female  Program Start date: 2023  Date of Occupational Therapy Evaluation: 1/10/2023  Anticipated discharge: On or around: 2023    Medical and Therapy History  Presenting Problem (From DA completed on 1/3/2023): The reason for seeking services at this time is: Depression, anxiety, insomnia, suicide attempt prior to this admission  The problem(s) began to worsen three months ago. She has a hx of major depressive disorder and anxiety.  She was brought to the emergency room by her father.    Mental Health Diagnosis (From Dr. Denis Ahn H&P completed on 2023): GURU (generalized anxiety disorder)    Major depressive disorder, recurrent episode, severe with mixed features  Current ways taking care of presenting issues:  Patient was referred for an assessment by [address]. Patient has attempted to resolve these concerns in the past through medications. She had individual psychotherapy 'all her life'.  She does not have a psychotherapist at this time, but she signed up for [facility] clinic.  A primary care MD prescribes her medications.   Current Patient Providers: Current Outpatient Psychiatric Provider: none; primary care provider has been prescribing medications  Current Outpatient Individual Psychotherapist: none; referral has been placed  Primary Care Provider: Rosetta Lowe MD at [facility]  Medical Conditions and Co-morbidities:  Insomnia due to other mental disorder    Occupational Therapy History: Chart review indicates knowledge of OT during hospitalization.  Occupational Profile: Occupations are  the everyday activities that people do as individuals, in families, and with communities to occupy time and bring meaning and purpose to life. Occupations include things people need to, want to and are expected to do  (World  "Federation of Occupational Therapists, 2012a, para. 2). Occupations are categorized as activities of daily living, instrumental activities of daily living, health management, rest and sleep, education, work, play, leisure, spirituality, and social participation within specific environments or contexts.  Occupational History:   Patient concerns related to participation in occupations and how they have changed over time Patient reports the following functional impairments:  health maintenance, self-care, social interactions and work / vocational responsibilities.      What are barriers to engagement in occupations:   \"Right now it's hard for me to concentrate and focus,\" attributes this more to discomfort from new medications than from anxiety  \"I don't want to die and I don't want to live this way -- so overwhelmed and afraid,\" denies suicidal ideation as such and does not find a current state of affairs in any way sustainable      Personal Interests, values, cultural considerations \"The patient describes their cultural background as [place]      Patient identified their sexual orientation as heterosexual.   Performance Patterns (routines, roles, habits & rituals)  Daughter, sister   Environment & context  Patient's current living/housing situation involves mom, dad and pt and they report that housing is stable.  Patient is currently employed.  Patient reports their finances are obtained through work and living with her parents where she reportedly has no expenses.   Patient does not identify finances as a current stressor.         Assessment of Occupational Performance Patterns: Acquired habits, routines, roles, and rituals used in the process of engaging consistently in occupations and can support or hinder occupational performance. Performance patterns help establish lifestyles and occupational balance (proportion of time spend in productive, restorative, and leisure activities) and are shaped " in part by context and cultural norms (AOTA Occupational Therapy Practice Framework: Domain and Process Fourth Edition).     Assessment format:   Observation of patient in context, Interview, Observation in Group Process, Chart review    Occupations:  Activities of Daily Living: Activities oriented toward taking care of one's own body and completed on a routine basis Patient report and observation   Bathing/showering  Personal hygiene/grooming  Dressing/clothing mgmt.  Medication Management  Nutritional intake  Sexual activity  Functional mobility  recently, bathing, eating     Instrumental Activities of daily Living: Activities to support daily life within the home and community   Care of others  Care of pets  Communication mgmt.  Driving & community mobility  Financial management  Home establishment & management  Bahai & spiritual expression  Safety & emergency preparedness  Shopping No pets, one pet but I live with my parents  Mostly just phone, calling, texting, emailing (recently no, because i've been so overwhelmed and depressed)  I drove for the first time in a long time. Driving was okay.  I dont know, i've never had to manage finances - that is where a lot of my stress comes from, feeling like I'm not prepared for life in general. I could survive when I didn't need a car, I feel very overwhelmed and out of control.     Health Management: Activities related to developing, managing, and maintaining health and wellness routines, including self-management, with the goal of improving or maintaining health to support participation in other occupations.   Social & emotional health promotion & maintenance.  Symptom & condition management.  Communication with healthcare system.  Mindfulness  Physical activity  Substance use Medication thing, I feel like I'm still not totally stable, from being in the hospital. They prescribed me a different medication and now we're trying to get me back to 200mg. It's only my  "2nd day.      Rest & Sleep: Activities related to obtaining restorative rest and sleep to support healthy, active engagement in other occupations.   Rest & relaxation  Sleep preparation & promotion  Sleep participation \"Oh my god, I've been taking trazodone and melatonin. It's because it's like a daily cycle. Every time I wake up, I feel the worst. I wake up with those feelings of not wanting to exist.\"     Education: Activities needed for learning and participating in the educational environment.   Formal educational participation.  Informal personal education needs or interest exploration.  Informal educational participation. Patient's highest education level was associates degree and she works full time.     Work: Labor or exertion related to the development, production, delivery, or management of objects or services; benefits. May be financial or nonfinancial (e.g., social connectedness, contributions to society, structure and routine to daily life;   Employment interest & pursuits  Employment seeking & acquisition  Job performance & maintenance  senior care preparation & adjustment  Volunteer exploration & participation. Employed - focus is difficult due to symptoms of anxiety  \"It is not a job I've been in for a long time, so I am still learning the job. I still feel unsure of when and how I should go back.\"    Per chart review: She reported that she did not know how to take care of herself and she was dependent on her parents to help her.  Employment is stressful.  She works in college admissions.  She has anxiety, decreased concentration, fear of inadequacy.  She uses marijuana occasionally.  She graduated from college and she lived in [place] for 4 years.  She came back to [place] in 2019 and she has been living with her parents.  She reported that she had anxiety when she was in [place] but she was able to manage it and that was the best part of her life.  She felt guilty and ashamed of being unable to lounge.  " "She reported anxiety starting in high school.  She had individual psychotherapy all her life.  She does not have psychotherapist at this time, but she signed out for [facility] health clinic.  A primary care prescribes her medications.  Emergency room  talked collateral information from patient's father.  He reported that patient's mental health has been deteriorating in the last 3 months.  She has had social anxiety since high school.  She has difficult time in employment, difficulties with adjustment, anxious     Leisure & Play: Nonobligatory activity that is intrinsically motivated and engaged in during discretionary time, that is, time not committed to obligatory occupations. Activities that are intrinsically motivated, internally controlled, and freely chosen and that may include fantasy, exploration, humor, risk taking, contests, and celebrations   Leisure/play exploration  Leisure/play participation \"Hmm... no much. Watch a show. Go on a walk. Everything feels hard right now.\"     Social Participation: Activities that involve social interaction with others, including family, friends, peers, and community members, and that support social interdependence   Community participation  Family participation  Friendships  Intimate partner relationships  Peer group participation  lives with parents \"not that much, today I might hang out with a friend. Since I'm at home, I don't get that much interaction, which probably doesn't help.\"       Client Factors & Performance Skills:   Safety:   Safety Assessment:   Patient denies current homicidal ideation and behaviors.  Patient denies current self-injurious ideation and behaviors.    Patient denied risk behaviors associated with substance use.  Patient denies any high risk behaviors associated with mental health symptoms.  Patient reports the following current concerns for their personal safety: None.  Patient reports there are firearms in the house.  The firearms " are secured in a locked space.  What is important to me and makes life worth living: My family is very important to me.  [employer], helping people  Substance use: Patient denies using alcohol.  Patient denies using tobacco.  Patient reports using cannabis 'every once in a while' with no consistency and last use was at [place] time.   Patient reports using/abusing the following substance(s). Patient reported no other substance use.     Trauma History: Per DA - atient did not serve in the . There are indications or report of significant loss, trauma, abuse or neglect issues related to: are no indications and client denies any losses, trauma, abuse, or neglect concerns.    Neurosensory patterns and preferences:    Awareness of body (Interoception/sensation/NS arousal level): aware of increased HR, muscle tension, weird headache, right side of the head/face, sweaty    Awareness of sensory based coping skills: no skills, willing to explore    Mood: Anxious  Affect: Congruent to mood    Thought Content:  Clear    Verbal Content: No observed deficiencies,     Concentration: Difficulty concentration    Energy Level:  Low    Decision Making:  Unable to make decisions    Motivation/Procrastination:  Procrastinates engaging in activties  Avoids engaging in activities    Frustration/Stress Management:  Needs assistance to manage frustrations/identify & apply coping skills    Self Awareness:  Demonstrates/espressess negative view of self    Interpersonal Skills: Difficulty/ineffective in expressing feelings  Difficulty/ineffective in managing conflict  Demonstrates/reports difficulty with trust /personal boundaries    Time Management:  Needs assistance to organize use of time and/or structure daily activities effectively      Intervention/Plan of Care:   (See Multidisciplinary Plan of Care for more details and progress towards goals)     Clinical Occupational Summary: Patient is a 27 year old single female diagnosed  "with GURU and MDD.  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 Patient was referred to the program after inpatient hospitilation. Patient has been an intermittent participant in groups.  Patient noted mental health symptoms have been impacting important occupations such as work and activities of daily living.  Pt reports currently on leave from work and needs more support and treatment at this time. Patient identified goals to work on while in the Partial Program.    Treatment diagnosis: Impaired participation in valued occupations (self care, leisure, work, socializing) due to mental health symptoms: anxiety, depression, negative self talk   Occupational Therapy Goal(s):   In Occupational Therapy groups Jossy will:    Lifestyle health: Learn, practice, apply 1-3 skills/strategies that support participation in meaningful activities with an emphasis on seeking out opportunities to learn \"life skills\" to improve mental health management after discharge.     Self-Regulation: Learn, practice, apply 2 body based self-regulation/mindfulness skills to improve anixety symptoms and increase distress tolerance to support participation in meaningful roles, routines, relationships and responsibilities.     Skilled Occupational Therapy Interventions: Including but not limited to:    Evaluation, goal setting, ongoing assessment, treatment planning, discharge planning.     Treatment groups: Facilitation and group cohesion development.  Psychoeducation and Experiential groups focusing on: Self-awareness & self-expression, lifestyle health, neurosensory applications/interventions, mindfulness, motivation, energy management, meaningful & purposeful intervention activities, self-regulation skill development, self-compassion and resiliency development, therapeutic use of self, community resources.    Medical Necessity: Moderate complexity (36851)  CPT codes & descriptors Occupational Profile " and  Medical/Therapy History Assessment of Occupational Performance Clinical   Decision Making   Overall Level       Low Complexity (96862) Brief history relating to presenting  Problem   Problem-focused. 1-3 performance  deficits relating to  physical, cognitive, psychosocial  limitations/restrictions Low complexity, limited  amount of treatment options,  no assessment modification,  no co-morbidities          x Moderate Complexity (37193)   Expanded review of therapy/  medical records. Additional  review of physical, cognitive,  psychosocial performance Detailed. 3-5 performance  deficits relating to physical,  cognitive, psychosocial limitations/  restrictions Moderate analytical complexity,  detailed assessments,  minimal to moderate modification  of assessments, may have  comorbidities.     x x x    High Complexity (89708)   Extensive review of physical,  cognitive, psychosocial performance Comprehensive, 5 or more  performance deficits relating  to physical, cognitive, and  psychosocial limitations/restrictions. High analytical complexity,  comprehensive assessments,  multiple treatment options,  significant modifications of  assessment, comorbidities  affecting performance.            Would patient benefit from skilled Occupational Therapy Intervention to work on the above goals?    Yes    Patients potential for improvement towards goals: excellent, good, fair, guarded, poor    Signature: Annel Dobbs MA, OTR/L 1/11/2023

## 2023-01-10 NOTE — TELEPHONE ENCOUNTER
Jossy was not in PHP group.  Therapist called Jossy and the phone went straight to voice mail. Therapist left a message requesting Jossy to join group or to call the program line if she is unable to join group.    LIAM Navarro on 1/10/2023 at 9:51 AM

## 2023-01-11 ENCOUNTER — HOSPITAL ENCOUNTER (OUTPATIENT)
Dept: BEHAVIORAL HEALTH | Facility: CLINIC | Age: 28
Discharge: HOME | End: 2023-01-11
Attending: PSYCHIATRY & NEUROLOGY
Payer: COMMERCIAL

## 2023-01-11 ENCOUNTER — TELEPHONE (OUTPATIENT)
Dept: BEHAVIORAL HEALTH | Facility: CLINIC | Age: 28
End: 2023-01-11

## 2023-01-11 PROCEDURE — H0035 MH PARTIAL HOSP TX UNDER 24H: HCPCS | Mod: GT,95

## 2023-01-11 PROCEDURE — H0035 MH PARTIAL HOSP TX UNDER 24H: HCPCS | Mod: GT,95 | Performed by: OCCUPATIONAL THERAPIST

## 2023-01-11 PROCEDURE — H0035 MH PARTIAL HOSP TX UNDER 24H: HCPCS | Mod: GT,95 | Performed by: COUNSELOR

## 2023-01-11 NOTE — GROUP NOTE
Process Group Note    PATIENT'S NAME: Jossy Matthews  MRN:   [id number]  :   1995  ACCT. NUMBER: [id number]  DATE OF SERVICE: 23  START TIME:  9:00 AM  END TIME:  9:50 AM  FACILITATOR: Radha Solano LPCC  TOPIC:  Process Group    Diagnoses:  1.  Major depressive disorder, recurrent episode, severe with mixed features (H)   2.  GURU (generalized anxiety disorder)       [facility] Adult Partial Hospitalization Program  TRACK: [facility]    NUMBER OF PARTICIPANTS: 8                                      Service Modality:  Video Visit     Telemedicine Visit: The patient's condition can be safely assessed and treated via synchronous audio and visual telemedicine encounter.      Reason for Telemedicine Visit: Services only offered telehealth    Originating Site (Patient Location): Patient's home    Distant Site (Provider Location): Provider Remote Setting- Home Office    Consent:  The patient/guardian has verbally consented to: the potential risks and benefits of telemedicine (video visit) versus in person care; bill my insurance or make self-payment for services provided; and responsibility for payment of non-covered services.     Patient would like the video invitation sent by:  My Chart and email    Mode of Communication:  Video Conference via Medical Zoom    As the provider I attest to compliance with applicable laws and regulations related to telemedicine.                                   Data:    Session content: At the start of this group, patients were invited to check in by identifying themselves, describing their current emotional status, and identifying issues to address in this group.   Area(s) of treatment focus addressed in this session included Symptom Management, Personal Safety, Develop / Improve Independent Living Skills and Develop Socialization / Interpersonal Relationship Skills.    Jossy reported feeling 'tired and anxious' and discussed working toward 'putting away clothes.' She  "was unsure of what skills she would use to address the goal. She identified a barrier as 'having a hard time doing basic things to take care of myself.' She reported feeling that 'every morning, I feel like I want to die.' She expressed no plan or intent and committed to safety. She reported no SIB or substance use. She reported feeling proud/grateful for her family, friends, and cat.     Therapeutic Interventions/Treatment Strategies:  Psychotherapist offered support, feedback and validation, provided redirection and reinforced use of skills. Treatment modalities used include Cognitive Behavioral Therapy and Dialectical Behavioral Therapy. Interventions include Behavioral Activation: Reinforced benefits/challenges of change process through applying skills to replace unwanted behaviors, Explored how behaviors effect mood and interact with thoughts and feelings and Encouraged strategies to reduce individual procrastination and increase motivation by increasing goal-directed activities to enhance mood and reduce symptoms., Coping Skills: Discussed how the use of intentional \"in the moment\" actions can help reduce distress, Symptoms Management: Promoted understanding of their diagnoses and how it impacts their functioning and Emotions Management:  Reinforced the purpose and biological basis of emotions, Discussed barriers to emotional regulation and Reviewed opposite action skill.    Assessment:    Patient response:   Patient responded to session by accepting feedback, giving feedback, appearing disengaged, appearing distracted and verbalizing understanding    Possible barriers to participation / learning include: distracted by home tasks, leaving the screen     Health Issues:   None reported       Substance Use Review:   Substance Use: No active concerns identified.    Mental Status/Behavioral Observations  Appearance:   Disheveled   Eye Contact:   Fair   Psychomotor Behavior: Restless   Attitude:   Cooperative "   Orientation:   All  Speech   Rate / Production: Normal/ Responsive   Volume:  Normal   Mood:    Anxious   Affect:    Lethargic   Thought Content:   Safety reports  presence of suicidal ideation passive suicidal ideation   Thought Form:  Coherent  Logical     Insight:    Fair     Plan:     Safety Plan: Committed to safety and agreed to follow previously developed safety coping plan.      Barriers to treatment: None identified    Patient Contracts (see media tab):  None    Substance Use: Not addressed in session     Continue or Discharge: Patient will continue in Adult Partial Hospitalization Program (PHP)  as planned. Patient is likely to benefit from learning and using skills as they work toward the goals identified in their treatment plan.      Radha Solano, [facility]  January 11, 2023

## 2023-01-11 NOTE — GROUP NOTE
Psychotherapy Group Note    PATIENT'S NAME: Jossy Matthews  MRN:   [id number]  :   1995  ACCT. NUMBER: [id number]  DATE OF SERVICE: 23  START TIME: 10:00 AM  END TIME: 10:50 AM  FACILITATOR: Radha Solano LPCC  TOPIC:  EBP Group: Relationship Skills  [facility] Adult Partial Hospitalization Program  TRACK: [facility]    NUMBER OF PARTICIPANTS: 7    Summary of Group / Topics Discussed:  Relationship Skills: Relationship Mapping: Patients identified different types of relationships they have in their life and examined if there is conflict or closeness, the degree of conflict or closeness, and the reason for conflict. The goal of this topic is to help patients gain awareness of the relationships they have with others, identify types of conflict in patients  lives and how they impact symptoms/functioning, and identify how they can improve relationships with relationship and interpersonal skills they have learned.     Patient Session Goals / Objectives:    Familiarized patients with awareness to the different types of relationships they may have with different people and substances in their lives    Discussed and practiced strategies to promote healthier understanding of interpersonal relationships with a focus on awareness of conflict, the causes of conflict, and the ways to transform conflict    Discussed the use of substances and its impact on their relationships                                      Service Modality:  Video Visit     Telemedicine Visit: The patient's condition can be safely assessed and treated via synchronous audio and visual telemedicine encounter.      Reason for Telemedicine Visit: Services only offered telehealth    Originating Site (Patient Location): Patient's home    Distant Site (Provider Location): Provider Remote Setting- Home Office    Consent:  The patient/guardian has verbally consented to: the potential risks and benefits of telemedicine (video visit) versus in  person care; bill my insurance or make self-payment for services provided; and responsibility for payment of non-covered services.     Patient would like the video invitation sent by:  My Chart and email    Mode of Communication:  Video Conference via Medical Zoom    As the provider I attest to compliance with applicable laws and regulations related to telemedicine.                               Patient Participation / Response:  Moderately participated, sharing some personal reflections / insights and adequately adequately received / provided feedback with other participants.    Demonstrated understanding of topics discussed through group discussion and participation and Demonstrated understanding of relationship skills and communication skills    Treatment Plan:  Patient has a current master individualized treatment plan and today was our weekly review of the patient's progress.  See Epic treatment plan for progress / updates on goals and plan.    Radha Solano LPCC

## 2023-01-11 NOTE — GROUP NOTE
OT Clinic Group Note    PATIENT'S NAME: Jossy Matthews  MRN:   [id number]  :   1995  ACCT. NUMBER: [id number]  DATE OF SERVICE: 23  START TIME: 11:00 AM  END TIME: 11:50 AM  FACILITATOR: Natalie Harper OTR/L  TOPIC: [facility] OT Group: Occupational Therapy Clinic  [facility] Adult Partial Hospitalization Program  TRACK: 1    NUMBER OF PARTICIPANTS: 8                                      Service Modality:  Video Visit     Telemedicine Visit: The patient's condition can be safely assessed and treated via synchronous audio and visual telemedicine encounter.      Reason for Telemedicine Visit: Services only offered telehealth    Originating Site (Patient Location): Patient's home    Distant Site (Provider Location): Provider Remote Setting- Home Office    Consent:  The patient/guardian has verbally consented to: the potential risks and benefits of telemedicine (video visit) versus in person care; bill my insurance or make self-payment for services provided; and responsibility for payment of non-covered services.     Patient would like the video invitation sent by:  My Chart    Mode of Communication:  Video Conference via Medical Zoom    As the provider I attest to compliance with applicable laws and regulations related to telemedicine.      Summary of Group / Topics Discussed:  Occupational Therapy Clinic: Provided opportunity for patients to independently choose and engage in a therapeutic activity that supports progress towards their goals and psychiatric recovery. The Occupational Therapy Clinic provides a context for patients to monitor their symptoms, gain self-awareness, practice skills (self-regulation, mindfulness, self-talk, focus/concentration, social, productivity), build a sense of self efficacy and mastery, as well as receive validation, support, and resources. OT checks in, observes, assesses, and monitors performance skills and patterns in context with each group member. Patients were  provided orientation to the OT clinic and overview of purpose of the OT clinic in support of meeting their goals.     Patient Session Goals / Objectives:    Independently identify a purposeful and meaningful therapeutic activity.    Identified which goal(s) they are intentionally working on during session.     Reflected on their performance and share insight about their progress.    Practiced and identified a way to generalize a skill to their everyday life      Patient Participation / Response:  Fully participated with the group by sharing personal reflections / insights and openly received / provided feedback with other participants.    Patient presentation: arrived a few minutes late to group; active in session working on a productive task of her choice; reported positive benefit from the experience, Verbalized understanding of content and Patient would benefit from additional opportunities to practice the content to be able to generalize it to their everyday life with increased intentionality, consistency, and efficacy in support of their psychiatric recovery    Treatment Plan:  Patient has a current master individualized treatment plan.  See Epic treatment plan for more information.    Natalie Harper, OTR/L

## 2023-01-11 NOTE — GROUP NOTE
Psychoeducation Group Note    PATIENT'S NAME: Jossy Matthews  MRN:   [id number]  :   1995  ACCT. NUMBER: [id number]  DATE OF SERVICE: 23  START TIME:  1:00 PM  END TIME:  1:50 PM  FACILITATOR: Gail Grullon LMFT  TOPIC:  Wellness Group: Mental Health Maintenance  [facility] Adult Partial Hospitalization Program  TRACK: 1    NUMBER OF PARTICIPANTS: 7    Summary of Group / Topics Discussed:  Mental Health Maintenance:  Trust: In this group, the concept of trust was explored through the viewing, discussion, and self-reflection of the Yanira Brown Talk Titled,  The Anatomy of Trust.      Patient Session Goals / Objectives:  ? Defined and described definition of trust   ? Identified ways to connect the BRAVING acronym with earlier relationship mapping session                                    Service Modality:  Video Visit     Telemedicine Visit: The patient's condition can be safely assessed and treated via synchronous audio and visual telemedicine encounter.      Reason for Telemedicine Visit: Patient convenience (e.g. access to timely appointments / distance to available provider), Patient required immediate assessment / treatment , and Services only offered telehealth    Originating Site (Patient Location): Patient's home    Distant Site (Provider Location): Provider Remote Setting- Home Office    Consent:  The patient/guardian has verbally consented to: the potential risks and benefits of telemedicine (video visit) versus in person care; bill my insurance or make self-payment for services provided; and responsibility for payment of non-covered services.     Patient would like the video invitation sent by:  My Chart    Mode of Communication:  Video Conference via Medical Zoom    As the provider I attest to compliance with applicable laws and regulations related to telemedicine.                               Patient Participation / Response:  Fully participated with the group by sharing  personal reflections / insights and openly received / provided feedback with other participants.    Demonstrated understanding of topics discussed through group discussion and participation and Verbalized understanding of mental health maintenance topic    Treatment Plan:  Patient has a current master individualized treatment plan.  See Epic treatment plan for more information.    Gail Grullon LMFT

## 2023-01-11 NOTE — PROGRESS NOTES
Acknowledgement of Current Treatment Plan       I have reviewed my treatment plan with my therapist / counselor on 1/11/2023.   I agree with the plan as it is written in the electronic health record.    Name:      Signature:  Jossy Matthews Unable to sign due to Covid-19, verbal permission given 1/11/2023, 12pm.   Denis Ahn MD  Psychiatrist/Medical Director Denis Ahn MD on 1/12/2023 at 8:10 AM   Gail Grullon MA, LMFT LIAM Blake on 1/11/2023 at 3:54 PM   Annel Dobbs MA, OTR/L Annel Dobbs MA, OTR/L on January 11, 2023 at 2:12 PM

## 2023-01-11 NOTE — TELEPHONE ENCOUNTER
[facility] therapist called Jossy due to her being absent from [facility] groups all morning.  Jossy answered the phone and reported she was on her way to a doctor appointment.  Therapist noted she informed [facility] staff her doctor appointment was at 10am in the morning.  Jossy reported she had a second doctor appointment she forgot to mention before.  She was not sure how long the doctor appointment would be and said she might join [facility] groups later today if possible, otherwise she will be back in [facility] group tomorrow morning.   Therapist asked her if she has any safety concerns since her last check in and she said no.     LIAM Navarro on 1/10/2023 at 9:57 PM

## 2023-01-11 NOTE — GROUP NOTE
Psychoeducation Group Note    PATIENT'S NAME: Jossy Matthews  MRN:   [id number]  :   1995  ACCT. NUMBER: [id number]  DATE OF SERVICE: 23  START TIME:  2:00 PM  END TIME:  2:50 PM  FACILITATOR: Ra Lopez RN  TOPIC: MH Wellness Group: [facility]                                    Service Modality:  Video Visit     Telemedicine Visit: The patient's condition can be safely assessed and treated via synchronous audio and visual telemedicine encounter.      Reason for Telemedicine Visit: Patient has requested telehealth visit and Services only offered telehealth    Originating Site (Patient Location): Patient's home    Distant Site (Provider Location): Provider Remote Setting- Home Office    Consent:  The patient/guardian has verbally consented to: the potential risks and benefits of telemedicine (video visit) versus in person care; bill my insurance or make self-payment for services provided; and responsibility for payment of non-covered services.     Patient would like the video invitation sent by:  My Chart    Mode of Communication:  Video Conference via Medical Zoom    As the provider I attest to compliance with applicable laws and regulations related to telemedicine.                           [facility] Adult Partial Hospitalization Program  TRACK: [facility]    NUMBER OF PARTICIPANTS: 8    Summary of Group / Topics Discussed:  Patients were provided education and discussed the topic of resilience using the APA's Road to Resilience.    Patient Session Goals / Objectives:  ? Patients discovered effective resilience-building strategies  ? Patients determined the importance of building resilience in supporting MH  ? Patients identified one technique they will use to support their resilience              Patient Participation / Response:  Fully participated with the group by sharing personal reflections / insights and openly received / provided feedback with other participants.    Demonstrated  understanding of topics discussed through group discussion and participation    Treatment Plan:  Patient has a current master individualized treatment plan.  See Epic treatment plan for more information.    Ra Lopez RN

## 2023-01-12 ENCOUNTER — HOSPITAL ENCOUNTER (OUTPATIENT)
Dept: BEHAVIORAL HEALTH | Facility: CLINIC | Age: 28
Discharge: HOME | End: 2023-01-12
Attending: PSYCHIATRY & NEUROLOGY
Payer: COMMERCIAL

## 2023-01-12 PROCEDURE — H0035 MH PARTIAL HOSP TX UNDER 24H: HCPCS | Mod: GT,95

## 2023-01-12 PROCEDURE — H0035 MH PARTIAL HOSP TX UNDER 24H: HCPCS | Mod: GT,95 | Performed by: OCCUPATIONAL THERAPIST

## 2023-01-12 PROCEDURE — H0035 MH PARTIAL HOSP TX UNDER 24H: HCPCS | Mod: GT,95 | Performed by: SOCIAL WORKER

## 2023-01-12 NOTE — GROUP NOTE
Psychotherapy Group Note    PATIENT'S NAME: Jossy Matthews  MRN:   [id number]  :   1995  ACCT. NUMBER: [id number]  DATE OF SERVICE: 23  START TIME:  1:00 PM  END TIME:  1:50 PM  FACILITATOR: Asia Pinon LICSW  TOPIC:  EBP Group: Enhanced Mindfulness  [facility] Adult Partial Hospitalization Program  TRACK: 1    NUMBER OF PARTICIPANTS: 7    Summary of Group / Topics Discussed:  Enhanced Mindfulness: Body and Mind Integration: Patients received an overview and education regarding the importance of including the body in the management of emotional health and self-care and as a direct route to mindfulness practice.  Patients discussed various ways in which the body can serve as an informant to their physical and emotional experiences/need. Patients discussed the body as a direct link to the present moment and to mindfulness practice.  Patients discussed current relationship with body, self-awareness, mindfulness practice and barriers to connection with body.  Patients were guided through breath work and movement to facilitate greater self-awareness, grounding, self-expression, and connection to other.  Patients discussed how the experiential could be applied to better manage mental health and develop vanessa connection to self.    Patient Session Goals / Objectives:    Identify how movement awareness could be used for grounding, stress management, self-expression, connection to other and self-regulation    Improved awareness of breath and movement preferences    Identify how movement and the body is used in mindfulness practice    Reflect on use of these practices in everyday life.    Identify barriers to attending to body                                        Service Modality:  Video Visit         Telemedicine Visit: The patient's condition can be safely assessed and treated via synchronous audio and visual telemedicine encounter.          Reason for Telemedicine Visit: Services only offered  telehealth and covid19        Originating Site (Patient Location): Patient's home        Distant Site (Provider Location): Provider Remote Setting- Home Office        Consent:  The patient/guardian has verbally consented to: the potential risks and benefits of telemedicine (video visit) versus in person care; bill my insurance or make self-payment for services provided; and responsibility for payment of non-covered services.         Patient would like the video invitation sent by:  My Chart        Mode of Communication:  Video Conference via Medical Zoom        As the provider I attest to compliance with applicable laws and regulations related to telemedicine.                                                  Patient Participation / Response:  Moderately participated, sharing some personal reflections / insights and adequately adequately received / provided feedback with other participants.    Demonstrated understanding of topics discussed through group discussion and participation and Practiced skills in session    Treatment Plan:  Patient has a current master individualized treatment plan.  See Epic treatment plan for more information.    JIMY LySW

## 2023-01-12 NOTE — GROUP NOTE
Psychoeducation Group Note    PATIENT'S NAME: Jossy Matthews  MRN:   [id number]  :   1995  ACCT. NUMBER: [id number]  DATE OF SERVICE: 23  START TIME: 11:00 AM  END TIME: 11:50 AM  FACILITATOR: Natalie Harper OTR/L  TOPIC:  PHP OT Group: Self- Regulation Skills  [facility] Adult Partial Hospitalization Program  TRACK: 1    NUMBER OF PARTICIPANTS: 7                                      Service Modality:  Video Visit     Telemedicine Visit: The patient's condition can be safely assessed and treated via synchronous audio and visual telemedicine encounter.      Reason for Telemedicine Visit: Services only offered telehealth    Originating Site (Patient Location): Patient's home    Distant Site (Provider Location): [facility]    Consent:  The patient/guardian has verbally consented to: the potential risks and benefits of telemedicine (video visit) versus in person care; bill my insurance or make self-payment for services provided; and responsibility for payment of non-covered services.     Patient would like the video invitation sent by:  My Chart    Mode of Communication:  Video Conference via Medical Zoom    As the provider I attest to compliance with applicable laws and regulations related to telemedicine.     Summary of Group / Topics Discussed:  Self-Regulation Skills: Coping Through the Senses Introduction: Patients were introduced and taught about neurosensory based skills and strategies related to supporting effective self regulation skills.  Patients were taught about the eight sensory systems (external and internal) and how they can be used for coping with mental health symptoms and stressors.  Patients were provided with an experiential opportunity to increase self-awareness of helpful sensory input and self care activities. Patients were introduced on how to create supportive environments that encourage use of these  skills.    Patient Session Goals / Objectives:    Review/learn the 8 sensory systems and how they relate to self-regulation    Identified specific and individualized neurosensory skills to help when distressed      Identified skills learned and how this applies to current daily life    Established a plan for practice of these skills in their own environments      Patient Participation / Response:  Moderately participated, sharing some personal reflections / insights and adequately adequately received / provided feedback with other participants.    Patient presentation: arrived late to group; on/off screen at times; quiet in session and Patient would benefit from additional opportunities to practice the content to be able to generalize it to their everyday life with increased intentionality, consistency, and efficacy in support of their psychiatric recovery    Treatment Plan:  Patient has a current master individualized treatment plan.  See Epic treatment plan for more information.    Natalie Harper, OTR/L

## 2023-01-12 NOTE — TELEPHONE ENCOUNTER
PHP therapist called Jossy because she was not present in the PHP group.  Jossy reported she was not able to find the link to join group.  Therapist re-sent the link by email and Jossy rejoined the group.    LIAM Navarro on 1/11/2023 at 9:09 PM

## 2023-01-12 NOTE — GROUP NOTE
Psychoeducation Group Note    PATIENT'S NAME: Jossy Matthews  MRN:   [id number]  :   1995  ACCT. NUMBER: [id number]  DATE OF SERVICE: 23  START TIME:  2:00 PM  END TIME:  2:50 PM  FACILITATOR: Karlene Youssef RN  TOPIC:  Wellness Group: [facility] Adult Partial Hospitalization Program  TRACK: [facility]    NUMBER OF PARTICIPANTS: 6    Summary of Group / Topics Discussed:  Foundations of Health: Nutrition: Super Nutrients & Micronutrients: Super Nutrients are Foods that have high nutritional yield. Micronutrients are essential elements found in food or taken through supplements that are necessary for normal physiological functioning. This group was designed to complement the macronutrients group and build upon previous education. The changes that food makes on the brain (how the brain uses sugar) and nutrition as it relates to mental health was also discussed.       Patient Session Goals / Objectives:  ? Identified the health enhancing benefits to good nutrition  ? Verbalized ways in which the food we eat affects the brain  ? Explained the role of micronutrients in the body, how much we need, and how to get it                                    Service Modality:  Video Visit     Telemedicine Visit: The patient's condition can be safely assessed and treated via synchronous audio and visual telemedicine encounter.      Reason for Telemedicine Visit:  Telehealth only    Originating Site (Patient Location): Patient's home    Distant Site (Provider Location): Provider remote setting    Consent:  The patient/guardian has verbally consented to: the potential risks and benefits of telemedicine (video visit) versus in person care; bill my insurance or make self-payment for services provided; and responsibility for payment of non-covered services.     Patient would like the video invitation sent by:  My Chart    Mode of Communication:  Video Conference via Medical Zoom    As the provider I  attest to compliance with applicable laws and regulations related to telemedicine.                              Patient Participation / Response:  Minimally participated, only when prompted / asked.    Identified / Expressed personal readiness to practice skills    Treatment Plan:  Patient has a current master individualized treatment plan.  See Epic treatment plan for more information.    Karlene Youssef RN

## 2023-01-12 NOTE — GROUP NOTE
Process Group Note    PATIENT'S NAME: Jossy Matthews  MRN:   [id number]  :   1995  ACCT. NUMBER: [id number]  DATE OF SERVICE: 23  START TIME:  9:00 AM  END TIME:  9:50 AM  FACILITATOR: Gail Grullon LMFT  TOPIC:  Process Group    Diagnoses:  296.23 (F32.2) Major Depressive Disorder, Single Episode, Severe With anxious distress  300.02 (F41.1) Generalized Anxiety Disorder.    [facility] Adult Partial Hospitalization Program  TRACK: 1    NUMBER OF PARTICIPANTS: 8                                      Service Modality:  Video Visit     Telemedicine Visit: The patient's condition can be safely assessed and treated via synchronous audio and visual telemedicine encounter.      Reason for Telemedicine Visit: Patient convenience (e.g. access to timely appointments / distance to available provider), Patient required immediate assessment / treatment , and Services only offered telehealth    Originating Site (Patient Location): Patient's home    Distant Site (Provider Location): Provider Remote Setting- Home Office    Consent:  The patient/guardian has verbally consented to: the potential risks and benefits of telemedicine (video visit) versus in person care; bill my insurance or make self-payment for services provided; and responsibility for payment of non-covered services.     Patient would like the video invitation sent by:  My Chart    Mode of Communication:  Video Conference via Medical Zoom    As the provider I attest to compliance with applicable laws and regulations related to telemedicine.                               Data:    Session content: At the start of this group, patients were invited to check in by identifying themselves, describing their current emotional status, and identifying issues to address in this group.   Area(s) of treatment focus addressed in this session included Symptom Management, Personal Safety and Community Resources/Discharge Planning.    Jossy reported feeling  " afraid, depressed, overwhelmed  today. Patient identified the following goal(s) to work towards today:  I don t have a clear goal today,  encouraged to pick something small,  move, I guess, I think sitting like this makes me feel worse.  Patient plans to use the following skills to achieve their goal:  opposite action.  Patient anticipates the following barriers that may interfere with achieving their goal:  myself.  Endorses passive safety concerns with suicidal thoughts  I don t want to exist,  I m afraid of taking care of myself, not because I don t feel loved or alone it s just everything else , reports coping by engaging a supportive friend and going on a walk. Denies chemical use, and reports taking their medications as prescribed. Patient reports feeling proud of/grateful for:  my family and friends, my cat.  Patient asked for the following supports from the group today:  I don t know.  Declined additional processing time.     Therapeutic Interventions/Treatment Strategies:  Psychotherapist offered support, feedback and validation, set limits, provided redirection and reinforced use of skills. Treatment modalities used include Motivational Interviewing, Cognitive Behavioral Therapy and Dialectical Behavioral Therapy. Interventions include Behavioral Activation: Reinforced benefits/challenges of change process through applying skills to replace unwanted behaviors and Encouraged strategies to reduce individual procrastination and increase motivation by increasing goal-directed activities to enhance mood and reduce symptoms. and Coping Skills: Discussed use of self-soothe skills to decrease distress in the body, Assisted patient in identifying 1-2 healthy distraction skills to reduce overall distress, Discussed how the use of intentional \"in the moment\" actions can help reduce distress and Assisted patient in understanding the purpose of planning / creating / participating / sharing in positive " experiences.    Assessment:    Patient response:   Patient responded to session by accepting feedback, giving feedback, listening and appearing disengaged    Possible barriers to participation / learning include: unresponsive to treatment strategies and and no barriers identified    Health Issues:   None reported       Substance Use Review:   Substance Use: No active concerns identified.    Mental Status/Behavioral Observations  Appearance:   Appropriate   Eye Contact:   Fair   Psychomotor Behavior: Normal  Restless   Attitude:   Cooperative  Friendly Pleasant  Orientation:   All  Speech   Rate / Production: Normal/ Responsive Monotone  Normal    Volume:  Normal   Mood:    Anxious  Depressed  Normal Sad   Affect:    Appropriate  Worrisome   Thought Content:   Rumination and Safety reports  presence of suicidal ideation passive suicidal ideation   Thought Form:  Coherent  Logical     Insight:    Fair     Plan:     Safety Plan: Committed to safety and agreed to follow previously developed safety coping plan.      Barriers to treatment: None identified    Patient Contracts (see media tab):  None    Substance Use: Not addressed in session     Continue or Discharge: Patient will continue in Adult Partial Hospitalization Program (PHP)  as planned. Patient is likely to benefit from learning and using skills as they work toward the goals identified in their treatment plan.      LIAM Blake  January 12, 2023

## 2023-01-12 NOTE — GROUP NOTE
Psychotherapy Group Note    PATIENT'S NAME: Jossy Matthews  MRN:   [id number]  :   1995  ACCT. NUMBER: [id number]  DATE OF SERVICE: 23  START TIME: 10:00 AM  END TIME: 10:50 AM  FACILITATOR: Gail Grullon LMFT  TOPIC:  EBP Group: Self-Awareness  [facility] Adult Partial Hospitalization Program  TRACK: 1    NUMBER OF PARTICIPANTS: 7    Summary of Group / Topics Discussed:  Self-Awareness: Grief: Patients were provided with an overview of how personal losses impact their thoughts, feelings, and behaviors. Different stages of grief were discussed, with a focus on the personal and individual experiences of grief as a natural response to loss. The relationship between grief, depression, and anxiety was also discussed. Patients were provided with information regarding different ways of processing grief and shared their personal experiences.     Patient Session Goals / Objectives:    Defined and explored the concept of grief and the grieving process    Discussed relationship between grief, depression, and anxiety     Normalized and recognized the purpose/benefits of the grieving process    Discussed management of the thoughts and feelings associated with grief                                      Service Modality:  Video Visit     Telemedicine Visit: The patient's condition can be safely assessed and treated via synchronous audio and visual telemedicine encounter.      Reason for Telemedicine Visit: Patient convenience (e.g. access to timely appointments / distance to available provider), Patient required immediate assessment / treatment , and Services only offered telehealth    Originating Site (Patient Location): Patient's home    Distant Site (Provider Location): Provider Remote Setting- Home Office    Consent:  The patient/guardian has verbally consented to: the potential risks and benefits of telemedicine (video visit) versus in person care; bill my insurance or make self-payment for services  provided; and responsibility for payment of non-covered services.     Patient would like the video invitation sent by:  My Chart    Mode of Communication:  Video Conference via Medical Zoom    As the provider I attest to compliance with applicable laws and regulations related to telemedicine.                               Patient Participation / Response:  Moderately participated, sharing some personal reflections / insights and adequately adequately received / provided feedback with other participants.    Demonstrated understanding of topics discussed through group discussion and participation and Demonstrated understanding of values, strengths, and challenges to learn about themselves    Treatment Plan:  Patient has a current master individualized treatment plan.  See Epic treatment plan for more information.    Gail Grullon LMFT

## 2023-01-13 ENCOUNTER — HOSPITAL ENCOUNTER (OUTPATIENT)
Dept: BEHAVIORAL HEALTH | Facility: CLINIC | Age: 28
Discharge: HOME | End: 2023-01-13
Attending: PSYCHIATRY & NEUROLOGY
Payer: COMMERCIAL

## 2023-01-13 DIAGNOSIS — F33.2 MAJOR DEPRESSIVE DISORDER, RECURRENT EPISODE, SEVERE WITH MIXED FEATURES (H): ICD-10-CM

## 2023-01-13 DIAGNOSIS — F41.1 GAD (GENERALIZED ANXIETY DISORDER): Primary | ICD-10-CM

## 2023-01-13 PROCEDURE — H0035 MH PARTIAL HOSP TX UNDER 24H: HCPCS | Mod: GT,95

## 2023-01-13 PROCEDURE — 99214 OFFICE O/P EST MOD 30 MIN: CPT | Mod: 95 | Performed by: PSYCHIATRY & NEUROLOGY

## 2023-01-13 PROCEDURE — H0035 MH PARTIAL HOSP TX UNDER 24H: HCPCS | Mod: GT,95 | Performed by: OCCUPATIONAL THERAPIST

## 2023-01-13 PROCEDURE — H0035 MH PARTIAL HOSP TX UNDER 24H: HCPCS | Mod: GT,95 | Performed by: COUNSELOR

## 2023-01-13 NOTE — GROUP NOTE
Psychoeducation Group Note    PATIENT'S NAME: Jossy Matthews  MRN:   [id number]  :   1995  ACCT. NUMBER: [id number]  DATE OF SERVICE: 23  START TIME:  2:00 PM  END TIME: 2:35pm  FACILITATOR: Ra Lopez RN  TOPIC:  Wellness Group: Brain Health                                    Service Modality:  Video Visit     Telemedicine Visit: The patient's condition can be safely assessed and treated via synchronous audio and visual telemedicine encounter.      Reason for Telemedicine Visit: Services only offered telehealth    Originating Site (Patient Location): Patient's home    Distant Site (Provider Location): Provider Remote Setting- Home Office    Consent:  The patient/guardian has verbally consented to: the potential risks and benefits of telemedicine (video visit) versus in person care; bill my insurance or make self-payment for services provided; and responsibility for payment of non-covered services.     Patient would like the video invitation sent by:  My Chart    Mode of Communication:  Video Conference via Medical Zoom    As the provider I attest to compliance with applicable laws and regulations related to telemedicine.          [facility] Adult Partial Hospitalization Program  TRACK: [facility]    NUMBER OF PARTICIPANTS: 7    Summary of Group / Topics Discussed:  Brain Health:  Pathophysiology of Mood Disorders: Patients were educated on mood disorder etiology and neuroscience, risk factors, symptoms, and pharmacologic, psychotherapeutic, and complementary treatment options. Patients were guided on a discussion of mental, behavioral, and physical symptoms and shared their symptoms with the group.     Patient Session Goals / Objectives:  ? Described what mood disorders are and identified risk factors   ? Explained how chemical imbalances in the brain can cause symptoms and how medications work to reverse this imbalance   ? Identified and described pharmacologic, psychotherapeutic, and  complementary treatment options      Patient Participation / Response:  Fully participated with the group by sharing personal reflections / insights and openly received / provided feedback with other participants.    Demonstrated understanding of topics discussed through group discussion and participation    Treatment Plan:  Patient has a current master individualized treatment plan.  See Epic treatment plan for more information.    Ra Lopez RN

## 2023-01-13 NOTE — GROUP NOTE
Psychoeducation Group Note    PATIENT'S NAME: Jossy Matthews  MRN:   [id number]  :   1995  ACCT. NUMBER: [id number]  DATE OF SERVICE: 23  START TIME: 11:00 AM  END TIME: 11:50 AM  FACILITATOR: Natalie Harper OTR/L  TOPIC:  PHP OT Group: Lifestyle Balance and Structure  [facility] Adult Partial Hospitalization Program  TRACK: 1    NUMBER OF PARTICIPANTS: 7                                      Service Modality:  Video Visit     Telemedicine Visit: The patient's condition can be safely assessed and treated via synchronous audio and visual telemedicine encounter.      Reason for Telemedicine Visit: Services only offered telehealth    Originating Site (Patient Location): Patient's home    Distant Site (Provider Location): [facility]     Consent:  The patient/guardian has verbally consented to: the potential risks and benefits of telemedicine (video visit) versus in person care; bill my insurance or make self-payment for services provided; and responsibility for payment of non-covered services.     Patient would like the video invitation sent by:  My Chart    Mode of Communication:  Video Conference via Medical Zoom    As the provider I attest to compliance with applicable laws and regulations related to telemedicine.      Summary of Group / Topics Discussed:  Lifestyle Balance and Structure:  OT Goal-setting & integration: Weekend Wellness: The group focused on reflecting on the past week and identifying insights and positive experiences that they want to build upon further.  The group also focused on identifying needs and any concerns for the upcoming weekend and created a list of activities and tasks that they might engage in to help support themselves and add structure their weekend.  Facilitated the sharing of individual insights and plans with validation, support, and feedback provided.    Patient Session Goals /  Objectives:    Reflected on insights learned and positive experiences over the last week to help with their recovery and wellbeing    Identified needs and concerns for the upcoming weekend and identified ways to address these    Created a written list of possible ways to structure their weekend    Identified plan to support follow through on plans and reflection on progress made       Patient Participation / Response:  Fully participated with the group by sharing personal reflections / insights and openly received / provided feedback with other participants.    Patient presentation: constricted affect; reported feeling overwhelmed with no plans for the weekend; with some prompting/encouragement able to identify some possible activities; , Verbalized understanding of content and Patient would benefit from additional opportunities to practice the content to be able to generalize it to their everyday life with increased intentionality, consistency, and efficacy in support of their psychiatric recovery    Treatment Plan:  Patient has a current master individualized treatment plan.  See Epic treatment plan for more information.    Natalie Harper, OTR/L

## 2023-01-13 NOTE — PROGRESS NOTES
"[facility]   Adult Mental Health Outpatient Programs  Provider Interval History Note    Program (track): [facility] Program (track 1)    PATIENT'S NAME: Jossy Matthews  MRN:   [id number]  :   1995  ACCT. NUMBER: [id number]  DATE OF SERVICE: 23  CALL/VIDEO START TIME: 1425  CALL/VIDEO END TIME: 1450      Interval History:  \"I think I just wanted to check in.\" Jossy presents today for follow-up and ongoing program supervision.   Endorses:    Taking sertraline 200 mg    Stopped buspirone    \"Last night was hard to sleep\"  o Woke at 5, up until 8  o Had taken 100 mg trazodone and one of the melatonin    On further reflection is considering stepping down to intensive outpatient  o \"I don't know how well I will do without structure\"    Symptoms:    \"Still feeling very overwhelmed\"    \"Don't have a specific plan\"    Endorsing a sensation of \"heart racing\" for much of the day    Substance use:    None endorsed      Risk Assessment:    Suicidal ideation: has passive suicidal thoughts described as noted above    Thoughts of non-suicidal self-injury: none endorsed    Recent self-injurious behavior: none endorsed    Homicidal ideation: none endorsed    Other safety concerns: none endorsed      Medications:  Current Outpatient Medications   Medication Sig Dispense Refill     aspirin-acetaminophen-caffeine (EXCEDRIN EXTRA STRENGTH) 250-250-65 MG tablet Take 1 tablet by mouth daily as needed for headaches       busPIRone (BUSPAR) 10 MG tablet Take 1 tablet (10 mg) by mouth 3 times daily 90 tablet 0     gabapentin (NEURONTIN) 300 MG capsule Take 1 capsule (300 mg) by mouth 3 times daily (Patient not taking: Reported on 2023) 90 capsule 0     hydrOXYzine (ATARAX) 50 MG tablet Take 1 tablet (50 mg) by mouth every 4 hours as needed for anxiety (Patient not taking: Reported on 2023) 30 tablet 0     melatonin 5 MG tablet Take 1 tablet (5 mg) by mouth nightly as needed for " "sleep (Patient not taking: Reported on 1/5/2023) 30 tablet 0     multivitamin w/minerals (CENTRUM ADULTS) tablet Take 1 tablet by mouth daily       norethindrone-ethinyl estradiol (JUNEL FE 1/20) 1-20 MG-MCG tablet Take 1 tablet by mouth daily       sertraline (ZOLOFT) 100 MG tablet Take 1 tablet (100 mg) by mouth daily 30 tablet 0     traZODone (DESYREL) 50 MG tablet Take 1-2 tablets ( mg) by mouth nightly as needed for sleep 60 tablet 0         The above list was reviewed and updated in [facility] with patient today.     Patient is taking medications as prescribed and reports adverse effects as noted above      Laboratory Results:  Most recent labs reviewed. Pertinent updates/findings: None.     Metrics:  PHQ-9 scores:   PHQ-9 SCORE 1/3/2023   PHQ-9 Total Score 16       GURU-7 scores:   GURU-7 SCORE 1/5/2023 1/5/2023 1/5/2023   Total Score - - 11 (moderate anxiety)   Total Score 11 11 11       CSSR-S: No flowsheet data found.      Mental Status Examination (limited due to video virtual visit format):  Vital Signs: There were no vitals taken for this virtual visit.  Appearance: appropriately groomed, appears stated age, and in mild distress.  Attitude: cooperative   Eye Contact: good to the extent that can be determined in a video visit  Muscle Strength and Tone: no gross abnormalities based on remote observation  Psychomotor Behavior: Appropriate, Restless and Fidgety; no evidence of tardive dyskinesia, dystonia, or tics based on remote observation  Gait and Station: normal, no gross abnormalities based on remote observation  Speech: normal rate, production, volume, and rhythm of  Associations: No loosening of associations  Thought Process: coherent and goal directed  Thought Content: no evidence of psychotic thought, passive suicidal ideation present, no auditory hallucinations present and no visual hallucinations present  Mood: \"anxious\"  Affect: mood congruent, intensity is blunted, constricted mobility and " reactive  Insight: good  Judgment: intact, adequate for safety  Impulse Control: intact  Oriented to: time, place, person and situation  Attention Span and Concentration: normal  Language: Intact  Recent and Remote Memory: intact to interview. Not formally assessed. No amnesia.  Fund of Knowledge/Assessment of Intelligence: Average  Capacity of Activities of Daily Living: Independent, able to participate in programmatic care services.      Diagnosis/es:  1. GURU (generalized anxiety disorder)    2. Major depressive disorder, recurrent episode, severe with mixed features (H)        Assessment/Plan:  Jossy presents today for follow up. Endorses ongoing insomnia though improved compared to prior to admission. Patient noting palpitations but otherwise not endorsing significant increase in anxiety with increased dose of sertraline. Discussed possibility of adding propranolol for palpitations but patient declined for this weekend and would prefer to continue observation. Discussed that melatonin is likely not contributing to sleep onset and may be interfering with sleep maintenance so I recommended she discontinue it. We will continue sertraline 200 mg by mouth daily and trazodone 50 to 100 mg by mouth daily. Patient can take hydroxyzine if waking during the night but also discussed appropriate sleep hygiene, including getting out of bed if unable to sleep.    I have also placed a referral for transfer to intensive outpatient upon completion of our program. In the meantime, patient is endorsing ongoing passive suicidal ideation and hopelessness. Continue partial hospital program.      Anxiety  o Overall improved   o Still quite symptomatic  o Medications per above  o Continue PHP  o Anticipate stepdown to intensive outpatient next week      Depression  o Overall improved   o See above    Continue all other medications as reviewed per electronic medical record today    Continue therapy as planned:    Enrolled in partial  hospital program    Continues to benefit from services    Continues to meet criteria for this level of care    Patient would be at reasonable risk of requiring a higher level of care in the absence of current services.    I feel this patient does not meet criteria for an involuntary hold and is appropriate for treatment at an outpatient level of care.    Continue with individual therapist as appropriate    Safety plan reviewed:    To the Emergency Department as needed or call after hours crisis line at [phone] or [phone]. [place] Crisis Text Line: Text [place] to 536954 or Suicide LifeLine Chat: suicideHigh Society [email or web address].[email or web address]/chat    Follow-up:     schedule an appointment with me or another program provider in approximately in 1 week(s) or sooner if needed.  Can speak with a staff member or call the appropriate program number (see below) to schedule    Follow up with outpatient provider(s) as planned or sooner if needed for acute medical concerns.    Questions or concerns:    Call program line with questions or concerns (see below)    [employer] may be used to communicate with your provider, but this is not intended to be used for emergencies.      [facility] Adult Mental Health Program lines:  [facility]: [phone]  Dual Disorder: [phone]  Adult Day Treatment:  [phone]  55+/Intensive Outpatient: [phone]    Community Resources:    National Suicide Prevention Lifeline: 988 from any phone, or 018-126-6706 (TTY: 612.468.2117). Call anytime for help.  (www.suicidepreventionlifeline.org)  National [place] on Mental Illness (www.freya.org): 670.961.4661 or 221-241-1436.   Mental Health Association (www.mentalhealth.org): 670.304.1210 or 715-049-3143.  [place] Crisis Text Line: Text [place] to 147198  Suicide LifeLine Chat: suicideHigh Society [email or web address].[email or web address]/chat    Treatment Objective(s) Addressed in This Session:  The purpose of today's virtual visit is for this writer to provide oversight of  patient's care while receiving program services. Specific treatment goals addressed included personal safety, symptoms stabilization and management, wellness and mental health, and community resources/discharge planning.     This author or another program provider will follow up with the patient as noted above.     Patient agrees with the current plan of care.    Denis Ahn MD  1/13/23      Visit Details:  Type of service:  Video Visit    Start/End Time: see above    Originating Location (pt. Location): Home in [place]    Distant Location (provider location): Provider Remote Setting- Home Office    Platform used for Video Visit: Zoom    Physician has received verbal consent for a Video Visit from the patient? Yes    35 minutes spent on the date of the encounter doing chart review, patient visit, documentation and discussion with other provider(s)     This document completed in part using Dragon Medical One dictation software.  Please excuse any inadvertent word or phrase substitutions.

## 2023-01-13 NOTE — GROUP NOTE
Psychotherapy Group Note    PATIENT'S NAME: Jossy Matthews  MRN:   [id number]  :   1995  ACCT. NUMBER: [id number]  DATE OF SERVICE: 23  START TIME:  1:00 PM  END TIME:  1:50 PM  FACILITATOR: Gail Grullon LMFT  TOPIC: MH EBP Group: Relationship Skills  [facility] Adult Partial Hospitalization Program  TRACK: 1    NUMBER OF PARTICIPANTS: 6    Summary of Group / Topics Discussed:  Relationship Skills: Boundaries: Patients were provided with a general overview of interpersonal boundaries and how lack of boundaries relates to symptoms and functioning. The purpose is to help patients identify boundary issues and gain awareness and skills to work towards healthier interpersonal boundaries. Current awareness of healthy boundary characteristics and barriers to establishing healthy boundaries were discussed.    Patient Session Goals / Objectives:    Familiarized patients with the concept of interpersonal boundaries and their characteristics    Discussed and practiced strategies to promote healthier interpersonal boundaries    Identified boundary issues and identified plan to improve boundaries                                      Service Modality:  Video Visit     Telemedicine Visit: The patient's condition can be safely assessed and treated via synchronous audio and visual telemedicine encounter.      Reason for Telemedicine Visit: Patient convenience (e.g. access to timely appointments / distance to available provider), Patient required immediate assessment / treatment , and Services only offered telehealth    Originating Site (Patient Location): Patient's home    Distant Site (Provider Location): Provider Remote Setting- Home Office    Consent:  The patient/guardian has verbally consented to: the potential risks and benefits of telemedicine (video visit) versus in person care; bill my insurance or make self-payment for services provided; and responsibility for payment of non-covered services.      Patient would like the video invitation sent by:  My Chart    Mode of Communication:  Video Conference via Medical Zoom    As the provider I attest to compliance with applicable laws and regulations related to telemedicine.                               Patient Participation / Response:  Moderately participated, sharing some personal reflections / insights and adequately adequately received / provided feedback with other participants.    Demonstrated understanding of topics discussed through group discussion and participation and Demonstrated understanding of relationship skills and communication skills    Treatment Plan:  Patient has a current master individualized treatment plan.  See Epic treatment plan for more information.    Gail Grullon LMFT

## 2023-01-15 NOTE — GROUP NOTE
Process Group Note    PATIENT'S NAME: Jossy Matthews  MRN:   [id number]  :   1995  ACCT. NUMBER: [id number]  DATE OF SERVICE: 23  START TIME:  9:00 AM  END TIME:  9:50 AM  FACILITATOR: Alex Taylor LMFT  TOPIC:  Process Group    Diagnoses:  1. GURU (generalized anxiety disorder)   2.  Major depressive disorder, recurrent episode, severe with mixed features (H)       [facility] Adult Partial Hospitalization Program  TRACK: [facility]    NUMBER OF PARTICIPANTS: 7    Service Modality:  Video Visit     Telemedicine Visit: The patient's condition can be safely assessed and treated via synchronous audio and visual telemedicine encounter.      Reason for Telemedicine Visit: Services only offered telehealth    Originating Site (Patient Location): Patient's home    Distant Site (Provider Location): Provider Remote Setting- Home Office    Consent:  The patient/guardian has verbally consented to: the potential risks and benefits of telemedicine (video visit) versus in person care; bill my insurance or make self-payment for services provided; and responsibility for payment of non-covered services.     Patient would like the video invitation sent by:  [employer] and Email    Mode of Communication:  Video Conference via Medical Zoom    As the provider I attest to compliance with applicable laws and regulations related to telemedicine.            Data:    Session content: At the start of this group, patients were invited to check in by identifying themselves, describing their current emotional status, and identifying issues to address in this group.   Area(s) of treatment focus addressed in this session included Symptom Management, Personal Safety and Community Resources/Discharge Planning.  Patient reported feeling tired and anxious today. Heart racing and mind won t stop.   Patient discussed working toward take a shower and maybe go see a movie.  A barrier to working toward their goal(s) and/or addressing mental  health symptoms the patient identified was anxiety and depression.  Patient reported passive SI, with no plan or intent to act on the SI and committed to safety. Patient reported no substance use. Patient reported they are taking their medications as prescribed.   Patient reported feeling proud/grateful for family and friends.    Therapeutic Interventions/Treatment Strategies:  Psychotherapist offered support, feedback and validation. Treatment modalities used include Cognitive Behavioral Therapy. Interventions include Coping Skills: Facilitated understanding of  what factors may contribute to symptom relapse and skills plan to manage symptom relapse  and Symptoms Management: Promoted understanding of their diagnoses and how it impacts their functioning.      Assessment:    Patient response:   Patient responded to session by accepting feedback, listening, focusing on goals, being attentive and accepting support    Possible barriers to participation / learning include: Severity of Symptoms    Health Issues:   None reported       Substance Use Review:   Substance Use: No active concerns identified.    Mental Status/Behavioral Observations  Appearance:   Appropriate   Eye Contact:   Good   Psychomotor Behavior: Normal   Attitude:   Cooperative   Orientation:   All  Speech   Rate / Production: Normal    Volume:  Normal   Mood:    Anxious Depressed  Affect:    Appropriate   Thought Content:   Clear and Safety reports recent presence of suicidal ideation passive suicidal ideation  Thought Form:  Coherent  Logical     Insight:    Good     Plan:     Safety Plan: Committed to safety.  Recommended that patient call 911 or go to the local ED should there be a change in any of these risk factors.    Barriers to treatment: None identified    Patient Contracts (see media tab):  None    Substance Use: Provided encouragement towards sobriety     Continue or Discharge: Patient will continue in Adult Partial Hospitalization Program  (PHP)  as planned. Patient is likely to benefit from learning and using skills as they work toward the goals identified in their treatment plan.        Alex Taylor, LMFT  January 14, 2023

## 2023-01-15 NOTE — GROUP NOTE
Psychotherapy Group Note    PATIENT'S NAME: Jossy Matthews  MRN:   [id number]  :   1995  ACCT. NUMBER: [id number]  DATE OF SERVICE: 23  START TIME: 10:00 AM  END TIME: 10:50 AM  FACILITATOR: Alex Taylor LMFT  TOPIC:  EBP Group: Coping Skills  [facility] Adult Partial Hospitalization Program  TRACK: [facility]    NUMBER OF PARTICIPANTS: 7    Summary of Group / Topics Discussed:  Coping Skills: Relapse Planning: Patients discussed and increased understanding of how anticipating and planning for possible increased symptoms is a proactive way to reduce the likelihood of relapse.  Patients shared individualized factors that may lead to increased symptoms and decompensation in functioning.  Each patient developed a relapse prevention plan designed to help them recognize when they may have increasing symptoms requiring them to take action and notify their key supports and care team.      Patient Session Goals / Objectives:    Identify and understand what factors may contribute to symptom relapse.    Identify actions that may be taken to manage increased symptoms/stressors.    Review or create an individualized written relapse plan    Problem solve barriers to plan creation and implementation    Share relapse plan with key support people    Service Modality:  Video Visit     Telemedicine Visit: The patient's condition can be safely assessed and treated via synchronous audio and visual telemedicine encounter.      Reason for Telemedicine Visit: Services only offered telehealth    Originating Site (Patient Location): Patient's home    Distant Site (Provider Location): Provider Remote Setting- Home Office    Consent:  The patient/guardian has verbally consented to: the potential risks and benefits of telemedicine (video visit) versus in person care; bill my insurance or make self-payment for services provided; and responsibility for payment of non-covered services.     Patient would like the video  invitation sent by:  [employer] and Email    Mode of Communication:  Video Conference via Medical Zoom    As the provider I attest to compliance with applicable laws and regulations related to telemedicine.          Patient Participation / Response:  Fully participated with the group by sharing personal reflections / insights and openly received / provided feedback with other participants.  Jossy reported feeling stuck.  Demonstrated understanding of topics discussed through group discussion and participation and Expressed understanding of the relevance / importance of coping skills at distressing times in life    Treatment Plan:  Patient has a current master individualized treatment plan.  See Epic treatment plan for more information.    Alex Taylor LMFT

## 2023-01-16 ENCOUNTER — HOSPITAL ENCOUNTER (OUTPATIENT)
Dept: BEHAVIORAL HEALTH | Facility: CLINIC | Age: 28
Discharge: HOME | End: 2023-01-16
Attending: PSYCHIATRY & NEUROLOGY
Payer: COMMERCIAL

## 2023-01-16 ENCOUNTER — TELEPHONE (OUTPATIENT)
Dept: BEHAVIORAL HEALTH | Facility: CLINIC | Age: 28
End: 2023-01-16

## 2023-01-16 PROCEDURE — H0035 MH PARTIAL HOSP TX UNDER 24H: HCPCS | Mod: GT,95 | Performed by: OCCUPATIONAL THERAPIST

## 2023-01-16 PROCEDURE — H0035 MH PARTIAL HOSP TX UNDER 24H: HCPCS | Mod: GT,95 | Performed by: COUNSELOR

## 2023-01-16 PROCEDURE — H0035 MH PARTIAL HOSP TX UNDER 24H: HCPCS | Mod: GT,95

## 2023-01-16 NOTE — GROUP NOTE
Psychoeducation Group Note    PATIENT'S NAME: Jossy Matthews  MRN:   [id number]  :   1995  ACCT. NUMBER: [id number]  DATE OF SERVICE: 23  START TIME: 11:00 AM  END TIME: 11:50 AM  FACILITATOR: Natalie Harper OTR/L  TOPIC: MH PHP OT Group: Self- Regulation Skills  [facility] Adult Partial Hospitalization Program  TRACK: 1    NUMBER OF PARTICIPANTS: 8                                      Service Modality:  Video Visit     Telemedicine Visit: The patient's condition can be safely assessed and treated via synchronous audio and visual telemedicine encounter.      Reason for Telemedicine Visit: Services only offered telehealth    Originating Site (Patient Location): Patient's home    Distant Site (Provider Location): [facility] Outpatient Setting: Partial [facility]    Consent:  The patient/guardian has verbally consented to: the potential risks and benefits of telemedicine (video visit) versus in person care; bill my insurance or make self-payment for services provided; and responsibility for payment of non-covered services.     Patient would like the video invitation sent by:  My Chart    Mode of Communication:  Video Conference via Medical Zoom    As the provider I attest to compliance with applicable laws and regulations related to telemedicine.         Summary of Group / Topics Discussed:  Self-Regulation Skills: Sensory Grounding Skills:  Patients actively participated in a psychoeducational discussion focused on identifying and utilizing skills for grounding when experiencing dissociation, flashbacks, panic attacks, ruminations, and/or suicidal thoughts.  Patients also participated in a discussion focused on identifying skills that can be used for preventative purposes.  Patient's explored body based skills (bottom up processing skills) and techniques to help manage symptoms and ground themselves so they can be in control and comfortable and able to function in different  environments.         Patient Session Goals / Objectives:    [place] how the ANS works and importance of its  impact on functioning and mental wellbeing    [place] how developing interoceptive awareness can help one self-regulate sooner rather than later    Identified signs and symptoms when grounding skills could be helpful     Identified specific and individualized neurosensory skills to help when distressed and for crisis management    Established a plan for practice of these skills in their own environments       Patient Participation / Response:  Fully participated with the group by sharing personal reflections / insights and openly received / provided feedback with other participants.    Patient presentation: constricted affect observed; active in experiential practice in session; , Verbalized understanding of content and Patient would benefit from additional opportunities to practice the content to be able to generalize it to their everyday life with increased intentionality, consistency, and efficacy in support of their psychiatric recovery    Treatment Plan:  Patient has a current master individualized treatment plan.  See Epic treatment plan for more information.    Natalie Harper, OTR/L

## 2023-01-16 NOTE — PROGRESS NOTES
Acknowledgement of Current Treatment Plan       I have reviewed my treatment plan with my therapist / counselor on 1/16/23.   I agree with the plan as it is written in the electronic health record.     Name:      Signature:  Jossy Matthews   unable to sign due to Covid-19, reviewed verbally     Denis Ahn MD  Psychiatrist/Medical Director Denis Ahn MD on 1/17/2023 at 9:55 AM   Radha Solano MA, LPCC JOSÉ LUIS Dewitt on 1/16/2023 at 2:10 PM

## 2023-01-16 NOTE — GROUP NOTE
Process Group Note    PATIENT'S NAME: Jossy Matthews  MRN:   [id number]  :   1995  ACCT. NUMBER: [id number]  DATE OF SERVICE: 23  START TIME:  9:00 AM  END TIME: 10:50 AM  FACILITATOR: Radha Solano LPCC  TOPIC:  Process Group    Diagnoses:  1.  Major depressive disorder, recurrent episode, severe with mixed features (H)   2.  GURU (generalized anxiety disorder)       [facility] Adult Partial Hospitalization Program  TRACK: [facility]    NUMBER OF PARTICIPANTS: 8                                      Service Modality:  Video Visit     Telemedicine Visit: The patient's condition can be safely assessed and treated via synchronous audio and visual telemedicine encounter.      Reason for Telemedicine Visit: Services only offered telehealth    Originating Site (Patient Location): Patient's home    Distant Site (Provider Location): Provider Remote Setting- Home Office    Consent:  The patient/guardian has verbally consented to: the potential risks and benefits of telemedicine (video visit) versus in person care; bill my insurance or make self-payment for services provided; and responsibility for payment of non-covered services.     Patient would like the video invitation sent by:  My Chart    Mode of Communication:  Video Conference via Medical Zoom    As the provider I attest to compliance with applicable laws and regulations related to telemedicine.                                   Data:    Session content: At the start of this group, patients were invited to check in by identifying themselves, describing their current emotional status, and identifying issues to address in this group.   Area(s) of treatment focus addressed in this session included Symptom Management, Personal Safety, Community Resources/Discharge Planning, Develop / Improve Independent Living Skills and Develop Socialization / Interpersonal Relationship Skills.    Jossy reported feeling 'extremely overwhelmed, anxious, and  conflicted.' She reported specifically being anxious about completing PHP soon. She was unsure about a specific goals or skills to use. She identified a potential barrier to working toward a goal as her anxiety and depression symptoms. She endorsed passive suicidal thoughts and committed to safety and showing up to group tomorrow. She reported no substance use or SIB. She reported taking medications as prescribed. She reported feeling proud/grateful for her friends and family.     Therapeutic Interventions/Treatment Strategies:  Psychotherapist offered support, feedback and validation and reinforced use of skills. Treatment modalities used include Cognitive Behavioral Therapy and Dialectical Behavioral Therapy. Interventions include Behavioral Activation: Reinforced benefits/challenges of change process through applying skills to replace unwanted behaviors, Explored how behaviors effect mood and interact with thoughts and feelings and Encouraged strategies to reduce individual procrastination and increase motivation by increasing goal-directed activities to enhance mood and reduce symptoms., Coping Skills: Reviewed patients current calming practices and discussed a more formal way of practicing and accessing skills and Promoted understanding of how and when to apply grounding strategies to reduce distress and increase presence in the moment and Emotions Management:  Reinforced the purpose and biological basis of emotions and Discussed barriers to emotional regulation.    Assessment:    Patient response:   Patient responded to session by accepting feedback, listening, focusing on goals, appearing distracted and verbalizing understanding    Possible barriers to participation / learning include: unresponsive to treatment strategies    Health Issues:   None reported       Substance Use Review:   Substance Use: No active concerns identified.    Mental Status/Behavioral Observations  Appearance:   Appropriate   Eye  Contact:   Fair   Psychomotor Behavior: Restless   Attitude:   Cooperative   Orientation:   All  Speech   Rate / Production: Monotone    Volume:  Soft   Mood:    Anxious   Affect:    Restricted  Worrisome   Thought Content:   Safety reports  presence of suicidal ideation passive suicidal ideation   Thought Form:  Coherent  Logical     Insight:    Fair     Plan:     Safety Plan: Committed to safety and agreed to follow previously developed safety coping plan.      Barriers to treatment: unresponsive to tx strategies    Patient Contracts (see media tab):  None    Substance Use: Not addressed in session     Continue or Discharge: Patient will continue in Adult Partial Hospitalization Program (PHP)  as planned. Patient is likely to benefit from learning and using skills as they work toward the goals identified in their treatment plan.      Radha Solano, [facility]  January 16, 2023

## 2023-01-16 NOTE — GROUP NOTE
Psychotherapy Group Note    PATIENT'S NAME: Jossy Matthews  MRN:   [id number]  :   1995  ACCT. NUMBER: [id number]  DATE OF SERVICE: 23  START TIME:  1:00 PM  END TIME:  1:50 PM  FACILITATOR: Alex Taylor LMFT  TOPIC:  EBP Group: Specialty Awareness  [facility] Adult Partial Hospitalization Program  TRACK: [facility]    NUMBER OF PARTICIPANTS: 8    Summary of Group / Topics Discussed:  Specialty Topics: Education Support Network: Patients reviewed handouts as tools for identifying the mild, moderate, and severe symptoms of their disorder.  Patients identified helpful supportive statements and actions they would appreciate from caregivers.  The goal is to gather information in preparation for the education support network for problem solving and planning for support with caregivers.      Patient Session Goals / Objectives:    Understanding diagnoses and accompanying physical reactions, thoughts, and behaviors.      Explored options to support patients in their recovery     Formulated a plan to communicate with caregivers for assistance and support to aid in recovery     Problem solved barriers to follow through on plan    Service Modality:  Video Visit     Telemedicine Visit: The patient's condition can be safely assessed and treated via synchronous audio and visual telemedicine encounter.      Reason for Telemedicine Visit: Services only offered telehealth    Originating Site (Patient Location): Patient's home    Distant Site (Provider Location): Provider Remote Setting- Home Office    Consent:  The patient/guardian has verbally consented to: the potential risks and benefits of telemedicine (video visit) versus in person care; bill my insurance or make self-payment for services provided; and responsibility for payment of non-covered services.     Patient would like the video invitation sent by:  [employer] and Email    Mode of Communication:  Video Conference via Medical Zoom    As the provider I  attest to compliance with applicable laws and regulations related to telemedicine.        Patient Participation / Response:  Fully participated with the group by sharing personal reflections / insights and openly received / provided feedback with other participants.  Agreed to inviting a support person to network education group tomorrow.    Demonstrated understanding of topics discussed through group discussion and participation, Identified / Expressed readiness to act on skill suggestions discussed in topic, Verbalized understanding of ways to proactively manage illness and Practiced skills in session    Treatment Plan:  Patient has a current master individualized treatment plan and today was our weekly review of the patient's progress.  See Epic treatment plan for progress / updates on goals and plan.    Alex Taylor LMFT

## 2023-01-16 NOTE — GROUP NOTE
Psychoeducation Group Note    PATIENT'S NAME: Jossy Matthews  MRN:   [id number]  :   1995  ACCT. NUMBER: [id number]  DATE OF SERVICE: 23  START TIME:  2:00 PM  END TIME:  2:50 PM  FACILITATOR: Karlene Youssef RN  TOPIC:  Wellness Group: [facility] Adult Partial Hospitalization Program  TRACK: [facility]    NUMBER OF PARTICIPANTS: 7    Summary of Group / Topics Discussed:  Foundations of Health: Sleep: Case study/sleep hygiene: Patients explored the connection between sleep and mental illness. Patients learned about how adequate sleep can improve health, productivity, wellness, quality of life, and safety.     Patient Session Goals / Objectives:  ? Demonstrated understanding of sleep hygiene practices and benefits of sleep  ? Identified sleep hygiene strategies to utilize     Described the connection between sleep disturbances and mental illness                                    Service Modality:  Video Visit     Telemedicine Visit: The patient's condition can be safely assessed and treated via synchronous audio and visual telemedicine encounter.      Reason for Telemedicine Visit: Services only offered telehealth    Originating Site (Patient Location): Patient's home    Distant Site (Provider Location): Provider Remote Setting- Home Office    Consent:  The patient/guardian has verbally consented to: the potential risks and benefits of telemedicine (video visit) versus in person care; bill my insurance or make self-payment for services provided; and responsibility for payment of non-covered services.     Patient would like the video invitation sent by:  My Chart    Mode of Communication:  Video Conference via Medical Zoom    As the provider I attest to compliance with applicable laws and regulations related to telemedicine.                              Patient Participation / Response:  Fully participated with the group by sharing personal reflections / insights and openly  received / provided feedback with other participants.    Identified / Expressed personal readiness to practice skills    Treatment Plan:  Patient has a current master individualized treatment plan.  See Epic treatment plan for more information.    Karlene Youssef RN

## 2023-01-17 ENCOUNTER — HOSPITAL ENCOUNTER (OUTPATIENT)
Dept: BEHAVIORAL HEALTH | Facility: CLINIC | Age: 28
Discharge: HOME | End: 2023-01-17
Attending: PSYCHIATRY & NEUROLOGY
Payer: COMMERCIAL

## 2023-01-17 DIAGNOSIS — F51.05 INSOMNIA DUE TO OTHER MENTAL DISORDER: ICD-10-CM

## 2023-01-17 DIAGNOSIS — F99 INSOMNIA DUE TO OTHER MENTAL DISORDER: ICD-10-CM

## 2023-01-17 DIAGNOSIS — F33.2 MAJOR DEPRESSIVE DISORDER, RECURRENT EPISODE, SEVERE WITH MIXED FEATURES (H): ICD-10-CM

## 2023-01-17 DIAGNOSIS — F41.1 GAD (GENERALIZED ANXIETY DISORDER): Primary | ICD-10-CM

## 2023-01-17 PROCEDURE — H0035 MH PARTIAL HOSP TX UNDER 24H: HCPCS | Mod: GT,95 | Performed by: COUNSELOR

## 2023-01-17 PROCEDURE — 99215 OFFICE O/P EST HI 40 MIN: CPT | Mod: 95 | Performed by: PSYCHIATRY & NEUROLOGY

## 2023-01-17 PROCEDURE — H0035 MH PARTIAL HOSP TX UNDER 24H: HCPCS | Mod: GT,95

## 2023-01-17 PROCEDURE — H0035 MH PARTIAL HOSP TX UNDER 24H: HCPCS | Mod: GT,95 | Performed by: OCCUPATIONAL THERAPIST

## 2023-01-17 PROCEDURE — 99417 PROLNG OP E/M EACH 15 MIN: CPT | Mod: 95 | Performed by: PSYCHIATRY & NEUROLOGY

## 2023-01-17 RX ORDER — TRAZODONE HYDROCHLORIDE 100 MG/1
100-200 TABLET ORAL
Qty: 60 TABLET | Refills: 0 | Status: SHIPPED | OUTPATIENT
Start: 2023-01-17 | End: 2023-01-23

## 2023-01-17 NOTE — GROUP NOTE
Psychoeducation Group Note    PATIENT'S NAME: Jossy Matthews  MRN:   [id number]  :   1995  ACCT. NUMBER: [id number]  DATE OF SERVICE: 23  START TIME: 10:00 AM  END TIME: 10:50 AM  FACILITATOR: Karlene Youssef RN  TOPIC:  Wellness Group: Mind/Body Practice & Complementary  [facility] Adult Partial Hospitalization Program  TRACK: [facility]    NUMBER OF PARTICIPANTS: 8    Summary of Group / Topics Discussed:  Mind/Body Practice & Complementary Therapies:  Progressive Muscle Relaxation: In addition to affecting our mood and behavior, psychological stress can cause a myriad of physical symptoms in our body. Patients were educated on these effects and guided to increased self-awareness of how stress affects their body. The purpose, benefits, history, and techniques of progressive muscle relaxation were discussed. In an instructor guided experiential, patients were guided to practice PMR to help reduce physical symptoms of psychological stress and achieve a more balanced feeling of well-being.    Patient Session Goals / Objectives:  ? Identified physiological symptoms of stress on the body  ? Listed & Explained the purpose and benefits to practicing PMR   ? Practiced progressive muscle relaxation experiential                                    Service Modality:  Video Visit     Telemedicine Visit: The patient's condition can be safely assessed and treated via synchronous audio and visual telemedicine encounter.      Reason for Telemedicine Visit: Services only offered telehealth    Originating Site (Patient Location): Patient's home    Distant Site (Provider Location): Provider Remote Setting- Home Office    Consent:  The patient/guardian has verbally consented to: the potential risks and benefits of telemedicine (video visit) versus in person care; bill my insurance or make self-payment for services provided; and responsibility for payment of non-covered services.     Patient would like the video  invitation sent by:  My Chart    Mode of Communication:  Video Conference via Medical Zoom    As the provider I attest to compliance with applicable laws and regulations related to telemedicine.                              Patient Participation / Response:  Minimally participated, only when prompted / asked.    Verbalized understanding of Mind/Body Practice & Complementary Therapies topic    Treatment Plan:  Patient has a current master individualized treatment plan.  See Epic treatment plan for more information.    Karlene Youssef RN

## 2023-01-17 NOTE — GROUP NOTE
Psychoeducation Group Note    PATIENT'S NAME: Jossy Matthews  MRN:   [id number]  :   1995  ACCT. NUMBER: [id number]  DATE OF SERVICE: 23  START TIME: 11:00 AM  END TIME: 11:50 AM  FACILITATOR: Natalie Harper OTR/L  TOPIC: MH PHP OT Group: Partial Hospitalization Program- Occupational Therapy  [facility] Adult Partial Hospitalization Program  TRACK: 1    NUMBER OF PARTICIPANTS: 8                                      Service Modality:  Video Visit     Telemedicine Visit: The patient's condition can be safely assessed and treated via synchronous audio and visual telemedicine encounter.      Reason for Telemedicine Visit: Services only offered telehealth    Originating Site (Patient Location): Patient's home    Distant Site (Provider Location): [facility] Outpatient Setting: [facility]    Consent:  The patient/guardian has verbally consented to: the potential risks and benefits of telemedicine (video visit) versus in person care; bill my insurance or make self-payment for services provided; and responsibility for payment of non-covered services.     Patient would like the video invitation sent by:  My Chart    Mode of Communication:  Video Conference via Medical Zoom    As the provider I attest to compliance with applicable laws and regulations related to telemedicine.     Summary of Group / Topics Discussed:  Cognitive Functioning: Patients were taught and provided with an opportunity to gain awareness of how their mental health symptoms impact their current cognitive functioning as well as how this impacts their performance and participation in meaningful roles, relationships, and routines.  Patients were taught skills and strategies on how to monitor and improve cognitive performance through remediation or compensatory strategies.  Patients were given opportunities to practice taught skills and techniques in session and how to apply to everyday life.        Patient Session  Goals / Objectives:    Identified how their mental health symptoms impact their functioning, focusing on specific cognitive challenges     Improved awareness of specific remediation and/or compensatory strategies to improve  executive functioning skills and how this relates to mental health recovery        Established a plan for practice of these skills in their own environments    Practiced and reflected on how to generalize taught skills to their everyday life          Patient Participation / Response:  Fully participated with the group by sharing personal reflections / insights and openly received / provided feedback with other participants.    Patient presentation: constricted affect that brightened at times observed; active in group experiential practice in session, Verbalized understanding of content and Patient would benefit from additional opportunities to practice the content to be able to generalize it to their everyday life with increased intentionality, consistency, and efficacy in support of their psychiatric recovery    Treatment Plan:  Patient has a current master individualized treatment plan.  See Epic treatment plan for more information.    Natalie Harper, OTR/L

## 2023-01-17 NOTE — PROGRESS NOTES
"[facility] Mental Health Outpatient Programs  Provider Interval History Note    Program (track): [facility] Hospital Program (track 1)    PATIENT'S NAME: Jossy Matthews  MRN:   [id number]  :   1995  ACCT. NUMBER: [id number]  DATE OF SERVICE: 23  CALL/VIDEO START TIME: 1110  CALL/VIDEO END TIME: 1130   Also met later with patient and her parents from 1453 to 1522      Interval History:  \"I got a bunch of questions.\" Jossy presents today for follow-up and ongoing program supervision.   Endorses:    \"Last night I did not sleep well at all\"  o Took 200 milligrams trazodone total over the course of the night in divided doses    Requests MOLLY for therapist, father  o Has already released information to father  o As discussed later, patient's therapist since the age of 15 is Hever Dumont ([phone], [email or web address])    Discussed returning to work, next steps in treatment  o Patient does not feel ready to return to work full-time  o Discussed whether part-time might be an option  o Discussed whether she would prefer to step down to intensive outpatient or another treatment    Patient requested that I'll meet with/discuss matters with her father    Met with patient, father in the afternoon following  programming  o Discussed options  - Patient, family believe that DBT will be a better fit than IOP  - Endorse to have an appointment set with the transition clinic for psychiatry  - Discussed that we prefer a 4-week commitment minimum for intensive outpatient but cannot reserve a spot on the wait list in case DBT is not accessible in a timely fashion    Substance use:    None endorsed      Risk Assessment:    Suicidal ideation: has passive suicidal thoughts described as \"because I'm just so overwhelmed,\" denies plan, intent    Thoughts of non-suicidal self-injury: denied    Recent self-injurious behavior: denied    Homicidal ideation: denied    Other " safety concerns: denied      Medications:  Current Outpatient Medications   Medication Sig Dispense Refill     aspirin-acetaminophen-caffeine (EXCEDRIN EXTRA STRENGTH) 250-250-65 MG tablet Take 1 tablet by mouth daily as needed for headaches       busPIRone (BUSPAR) 10 MG tablet Take 1 tablet (10 mg) by mouth 3 times daily (Patient not taking: Reported on 1/13/2023) 90 tablet 0     gabapentin (NEURONTIN) 300 MG capsule Take 1 capsule (300 mg) by mouth 3 times daily (Patient not taking: Reported on 1/9/2023) 90 capsule 0     hydrOXYzine (ATARAX) 50 MG tablet Take 1 tablet (50 mg) by mouth every 4 hours as needed for anxiety (Patient not taking: Reported on 1/5/2023) 30 tablet 0     melatonin 5 MG tablet Take 1 tablet (5 mg) by mouth nightly as needed for sleep (Patient not taking: Reported on 1/5/2023) 30 tablet 0     multivitamin w/minerals (CENTRUM ADULTS) tablet Take 1 tablet by mouth daily       norethindrone-ethinyl estradiol (JUNEL FE 1/20) 1-20 MG-MCG tablet Take 1 tablet by mouth daily       sertraline (ZOLOFT) 100 MG tablet Take 1 tablet (100 mg) by mouth daily 30 tablet 0     traZODone (DESYREL) 50 MG tablet Take 1-2 tablets ( mg) by mouth nightly as needed for sleep 60 tablet 0       The above list was reviewed and updated in [facility] with patient today.     Patient is taking medications as prescribed and denies adverse effects      Laboratory Results:  Most recent labs reviewed. Pertinent updates/findings: None.     Metrics:  PHQ-9 scores:   PHQ-9 SCORE 1/3/2023   PHQ-9 Total Score 16       GURU-7 scores:   GURU-7 SCORE 1/5/2023 1/5/2023 1/5/2023   Total Score - - 11 (moderate anxiety)   Total Score 11 11 11       CSSR-S: No flowsheet data found.      Mental Status Examination (limited due to video virtual visit format):  Vital Signs: There were no vitals taken for this virtual visit.  Appearance: appropriately groomed, appears stated age, and in mild distress.  Attitude: cooperative   Eye Contact: good  "to the extent that can be determined in a video visit  Muscle Strength and Tone: no gross abnormalities based on remote observation  Psychomotor Behavior: Appropriate and Fidgety; no evidence of tardive dyskinesia, dystonia, or tics based on remote observation  Gait and Station: normal, no gross abnormalities based on remote observation  Speech: normal rate, production, volume, and rhythm of  Associations: No loosening of associations and Perseverative  Thought Process: coherent and perseverating  Thought Content: no evidence of psychotic thought, passive suicidal ideation present, no auditory hallucinations present and no visual hallucinations present  Mood: \"anxious\"  Affect: mood congruent, intensity is blunted, constricted mobility and reactive  Insight: good  Judgment: intact, adequate for safety  Impulse Control: intact  Oriented to: time, place, person and situation  Attention Span and Concentration: normal  Language: Intact  Recent and Remote Memory: intact to interview. Not formally assessed. No amnesia.  Fund of Knowledge/Assessment of Intelligence: Average  Capacity of Activities of Daily Living: Independent, able to participate in programmatic care services.        Diagnosis/es:  1. GURU (generalized anxiety disorder)    2. Insomnia due to other mental disorder    3. Major depressive disorder, recurrent episode, severe with mixed features (H)        Assessment/Plan:  Jossy presents today for follow up. Endorses ongoing anxiety around life skills with overall elevated anxiety in general and some insomnia last night. As discussed with patient, insomnia and anxiety can be self-perpetuating and the addition of more medications may not ultimately be beneficial. Discussed the advantages of aerobic exercise as an adjunct for treatment of anxiety.    New prescription of trazodone sent today but no change in dose: patient advised to take between 50 and 200 mg daily at bedtime as needed. No change in sertraline " today. Patient will be following up in the transition clinic following discharge tomorrow.    As discussed with patient, family, DBT is likely the better fit when compared to primary mental health intensive outpatient. As we offer the latter and not the former, we will keep patient on the wait list in case DBT cannot be located in a timely fashion. Patient given resources for DBT providers in the state with a couple of names of specific providers to whom we have referred other patients.    Patient is reaching maximum benefit at this level of care in any case. If treatment gains persist, anticipate she'll be ready for discharge as planned tomorrow.      (F41.1) GURU (generalized anxiety disorder)  (primary encounter diagnosis)  o Comment: no changes to medication today  o Plan for discharge from partial hospital program and step down to DBT tomorrow      (F51.05,  F99) Insomnia due to other mental disorder  o Comment: continue trazodone, discussed sleep hygiene and exercise as adjuncts  o Plan: see above      (F33.2) Major depressive disorder, recurrent episode, severe with mixed features (H)  o Comment: see above  o Plan: see above    Continue all other medications as reviewed per electronic medical record today    Continue therapy as planned:    Enrolled in Partial Hospital Program    Reaching maximum benefit at this level of care; anticipate discharge tomorrow     I feel this patient does not meet criteria for an involuntary hold and is appropriate for treatment at an outpatient level of care.    Continue with individual therapist as appropriate    Safety plan reviewed:    To the Emergency Department as needed or call after hours crisis line at [phone] or [phone]. [place] Crisis Text Line: Text [place] to 592794 or Suicide LifeLine Chat: suicidepreventionlifeline.org/chat    Follow-up:     Follow up with outpatient provider(s) as planned or sooner if needed for acute medical concerns.    Questions or  concerns:    Call program line with questions or concerns (see below)    URXhart may be used to communicate with your provider, but this is not intended to be used for emergencies.      [facility] Adult Mental Health Program lines:  Partial Hospital: [phone]  Dual Disorder: [phone]  Adult Day Treatment:  [phone]  55+/Intensive Outpatient: [phone]    Community Resources:    National Suicide Prevention Lifeline: 988 from any phone, or 317-582-0459 (TTY: 745.265.6036). Call anytime for help.  (www.suicidepreventionlifeline.org)  National [place] on Mental Illness (www.freya.org): 951.678.2438 or 663-576-1225.   Mental Health Association (www.mentalhealth.org): 569.327.3594 or 415-093-3904.  [place] Crisis Text Line: Text [place] to 634441  Suicide LifeLine Chat: suicideFanplayrline.org/chat    Treatment Objective(s) Addressed in This Session:  The purpose of today's virtual visit is for this writer to provide oversight of patient's care while receiving program services. Specific treatment goals addressed included personal safety, symptoms stabilization and management, wellness and mental health, and community resources/discharge planning.     This author or another program provider will follow up with the patient as noted above.     Patient agrees with the current plan of care.    Denis Ahn MD  1/17/23      Visit Details:  Type of service:  Video Visit    Start/End Time: see above    Originating Location (pt. Location): Home in [place]    Distant Location (provider location): [facility] Outpatient Setting: [facility] adult mental health outpatient programmatic care offices    Platform used for Video Visit: Zoom    Physician has received verbal consent for a Video Visit from the patient? Yes    90 minutes spent on the date of the encounter doing chart review, patient visit, documentation, discussion with other provider(s) and discussion with family     This document  completed in part using Dragon Medical One dictation software.  Please excuse any inadvertent word or phrase substitutions.

## 2023-01-17 NOTE — DISCHARGE SUMMARY
"       Adult Mental Health Intensive Outpatient Discharge Summary/Instructions      Patient: Jossy Matthews MRN: [id number]   : 1995 Age: 27 year old Sex: female     Admission Date: 23  Discharge Date: 23  Diagnosis:   296.23 (F32.2) Major Depressive Disorder, Single Episode, Severe With anxious distress  300.02 (F41.1) Generalized Anxiety Disorder.    Focus of Treatment / Progress    Personal Safety: Jossy reported the suicidal ideation (SI) is \"the same\" since starting PHP.  Jossy denied any self-injurious behavior.  She completed the [place] since last contact assessment and she identified daily suicidal thoughts with thoughts about methods, such as crashing a car, hanging herself, or carbon monoxide, however, she denied having any plan or intent to act on those thoughts.   She reported her ability to cope with the SI has improve slightly since starting PHP, she was unable to identify any specific changes.  Patient committed to safety and agreed to follow her safety coping plan.  If safety concerns increase, Jossy agreed to call 911 or check into the emergency department, if needed to stay safe.  She put the crisis line number in her phone.    Suicide Risk and Safety Concerns were assessed for Jossy Matthews ,   1995.    Patient meets the following risk assessment and triage:  When the [place] Suicide Severity Rating Scale has been completed, the patient identifies lifetime history of suicidal ideation and/or Suicidal Behavior that is greater than 10 years.      The recommendation is to provide the Brief Safety Plan:    Reviewed safety plan from 1/3/23 date and made the following changes/additions coping skills were added and When the patient identifies the following:  Suicidal Ideation with intent or intent & plan  (Yes to C-SSRS Suicidal Ideation #4 and #5) in the past month     The following will be initiated:  Complete/Review/Update Safety Plan    Safety Plan:  Reviewed safety " "plan from 1/3/23 date and made the following changes/additions :  coping skills were added       * Follow your safety plan     * Call crisis lines as needed:    [facility] [phone] [facility] [phone]  [facility] [phone] Crisis Connection [phone]  [facility] [phone] [facility] [phone]  [facility] [phone] National Suicide Prevention [phone]  Or 988  [facility] [phone] Suicide Prevention [phone]  [facility] [phone]    Managing symptoms of:  Depression, anxiety and thoughts of suicide    Community support/health:  National [place] on Mental Illness   Https://namimn.org/    [place] Warm line Peer support  https://mentalhealthmn.org/support/[place]-warmline/   638-538-0911  Text \"Support\" to 13398    [place] Mental Health Help line  191.746.2823      Managing Symptoms and Preventing Relapse    * Go to all of your appointments    * Take all medications as directed.      * Carry a current list if medication with you    * Do not use illicit (street) drugs.  Avoid alcohol    * Report these symptoms to your care team. These are early signs of relapse:   Thoughts of suicide   Losing more sleep   Increased confusion   Mood getting worse   Feeling more aggressive   Other:  isolation    *Use these skills daily:  Create a routine or structure that works for your needs, yoga, practice mindfulness, opposite action, move your body intentionally every day, eat regular balanced meals daily, reach out to supports, coming up with more positive thoughts in response to negative thoughts, and practice self-compassion.     Copy of summary sent to: pt via 3DLT.com    My primary care provider: Primary Care Provider: Silvano Vizcaino [facility] Next Visit:     Current Outpatient Psychiatric Provider: Sunni Gonzalez APRN CNP, [facility],  [phone]    Next Visit:  1/23/23 at 2:00pm  Ofelia Bagley MD, [facility] PSYCHIATRY, " [phone]  Next Visit:  3/8/23 at 1:15pm  Current Outpatient Individual Psychotherapist: Hever Dumont, [facility], [phone] Next Visit: 1/19/23 at 12:15pm.    Go to group therapy and / or support groups at: Follow up with contacting the DBT resources we have given you, which will provide you with the needed support in using skills. Contact [facility] about the Intensive Outpatient program (IOP) if you are unable to get into DBT.   If you would like to look into groups, or other mental health services, at [facility], you can call the One Access line at 1-462.301.8729.    See your medical doctor about:  Annual physical and any medical concerns that may arise    Other:  Jossy, your treatment team wishes you the very best going forward.      Client Signature: unable to sign due to Covid-19, reviewed verbally  Staff Signature:_LIAM Navarro on 1/18/2023 at 5:00 PM

## 2023-01-17 NOTE — TELEPHONE ENCOUNTER
Jossy was not in the scheduled 9am PHP group.  Therapist called Jossy and asked her to join PHP group, she said she would join.    LIAM Navarro on 1/16/2023 at 9:37 PM

## 2023-01-17 NOTE — GROUP NOTE
Psychotherapy Group Note    PATIENT'S NAME: Jossy Matthews  MRN:   [id number]  :   1995  ACCT. NUMBER: [id number]  DATE OF SERVICE: 23  START TIME:  1:00 PM  END TIME:  2:50 PM  FACILITATOR: Radha Solano LPCC; Alex Taylor LMFT; Gail Grullon LMFT  TOPIC: MH EBP Group: Social Support  [facility] Adult Partial Hospitalization Program  TRACK: [facility]    NUMBER OF PARTICIPANTS: 8 (plus support persons)    Summary of Group / Topics Discussed:  Social Support:   Meeting: Patients and their support system participated in a session on creating family and community-based support.  The importance and role of family/social support was stressed to increase the effectiveness of treatment. Patients and their support system were given information on PHP structure, diagnoses served within the program, in addition to key goals and objectives. Additionally, physical symptoms, thoughts, and behaviors commonly observed in psychopathology were presented and discussed.  Lastly, information was presented on ways to help promote and support recovery.  The patients and their support system benefitted from this session by working through an exercise designed to clarify their needs for assistance and support in order to enhance their recovery. Staff helped each clarify their roles, so that the patients can be in charge of their stabilization and recovery.      Patient Session Goals / Objectives:    Normalize aspects of mental health conditions and mental illness    Provide education on  normal  vs.  abnormal  aspects of mental health    Clarify the role of partial hospitalization programming    Reduce stigma associated with mental health diagnosis (es).    Provide social support system with techniques and strategies to improve communication and enhance empathy.    Improve interpersonal communication regarding symptoms    Build a sense of mastery and self-respect                                     Service Modality:  Video Visit     Telemedicine Visit: The patient's condition can be safely assessed and treated via synchronous audio and visual telemedicine encounter.      Reason for Telemedicine Visit: Services only offered telehealth    Originating Site (Patient Location): Patient's home    Distant Site (Provider Location): Provider Remote Setting- Home Office    Consent:  The patient/guardian has verbally consented to: the potential risks and benefits of telemedicine (video visit) versus in person care; bill my insurance or make self-payment for services provided; and responsibility for payment of non-covered services.     Patient would like the video invitation sent by:  My Chart    Mode of Communication:  Video Conference via Medical Zoom    As the provider I attest to compliance with applicable laws and regulations related to telemedicine.                                 Patient Participation / Response:  Moderately participated, sharing some personal reflections / insights and adequately adequately received / provided feedback with other participants.    Jossy participated in  Group with father. Materials were provided for participants and those unable to attend.     Treatment Plan:  Patient has a current master individualized treatment plan.  See Epic treatment plan for more information.    Radha Solano LPCC

## 2023-01-17 NOTE — GROUP NOTE
Process Group Note    PATIENT'S NAME: Jossy Matthews  MRN:   [id number]  :   1995  ACCT. NUMBER: [id number]  DATE OF SERVICE: 23  START TIME:  9:00 AM  END TIME:  9:50 AM  FACILITATOR: Radha Solano LPCC  TOPIC:  Process Group    Diagnoses:    1. GURU (generalized anxiety disorder)   2.  Major depressive disorder, recurrent episode, severe with mixed features (H)       [facility] Adult Partial Hospitalization Program  TRACK: [facility]    NUMBER OF PARTICIPANTS: 4                                      Service Modality:  Video Visit     Telemedicine Visit: The patient's condition can be safely assessed and treated via synchronous audio and visual telemedicine encounter.      Reason for Telemedicine Visit: Services only offered telehealth    Originating Site (Patient Location): Patient's home    Distant Site (Provider Location): Provider Remote Setting- Home Office    Consent:  The patient/guardian has verbally consented to: the potential risks and benefits of telemedicine (video visit) versus in person care; bill my insurance or make self-payment for services provided; and responsibility for payment of non-covered services.     Patient would like the video invitation sent by:  My Chart    Mode of Communication:  Video Conference via Medical Zoom    As the provider I attest to compliance with applicable laws and regulations related to telemedicine.                                   Data:    Session content: At the start of this group, patients were invited to check in by identifying themselves, describing their current emotional status, and identifying issues to address in this group.   Area(s) of treatment focus addressed in this session included Symptom Management, Personal Safety, Community Resources/Discharge Planning, Develop / Improve Independent Living Skills and Develop Socialization / Interpersonal Relationship Skills.    Jossy reported feeling 'very unwell' and noted she did not sleep  "well and is struggling with head and neck pain today. She reported feeling 'very overwhelmed' by both the pain and 'not knowing what I need to do to be better.' She discussed working toward 'getting through group' and planned to practice listening skills to address the goal. She identified barriers as feeling 'super tired, distracted, and anxious.' Jossy endorsed passive suicidal thoughts and was committed to safety and showing up to group tomorrow. She reported no SIB or substance use. She reported taking medications as prescribed. She reported feeling proud/grateful for friends and family. Jossy took time to discuss difficulty with negative self-talk and was receptive to feedback and support.     Therapeutic Interventions/Treatment Strategies:  Psychotherapist offered support, feedback and validation and reinforced use of skills. Treatment modalities used include Motivational Interviewing, Cognitive Behavioral Therapy and Dialectical Behavioral Therapy. Interventions include Cognitive Restructuring:  Explored impact of ineffective thoughts / distortions on mood and activity and Assisted patient in formulating new neutral/positive alternatives to challenge less helpful / ineffective thoughts, Coping Skills: Discussed how the use of intentional \"in the moment\" actions can help reduce distress and Emotions Management:  Reinforced the purpose and biological basis of emotions and Discussed barriers to emotional regulation.    Assessment:    Patient response:   Patient responded to session by accepting feedback, giving feedback, listening, focusing on goals, being attentive, accepting support and verbalizing understanding    Possible barriers to participation / learning include: unresponsive to treatment strategies    Health Issues:   None reported       Substance Use Review:   Substance Use: No active concerns identified.    Mental Status/Behavioral Observations  Appearance:   Disheveled   Eye Contact:   Fair "   Psychomotor Behavior: Agitated  Restless   Attitude:   Cooperative   Orientation:   All  Speech   Rate / Production: Monotone  Normal    Volume:  Soft   Mood:    Anxious  Fearful  Affect:    Lethargic  Worrisome   Thought Content:   Safety reports  presence of suicidal ideation passive suicidal ideation   Thought Form:  Coherent  Logical     Insight:    Fair     Plan:     Safety Plan: Committed to safety and agreed to follow previously developed safety coping plan.      Barriers to treatment: None identified and not responsive to tx strategies    Patient Contracts (see media tab):  None    Substance Use: Not addressed in session     Continue or Discharge: Patient will continue in Adult Partial Hospitalization Program (PHP)  as planned. Patient is likely to benefit from learning and using skills as they work toward the goals identified in their treatment plan.      JOSÉ LUIS Dewitt  January 17, 2023

## 2023-01-18 ENCOUNTER — HOSPITAL ENCOUNTER (OUTPATIENT)
Dept: BEHAVIORAL HEALTH | Facility: CLINIC | Age: 28
Discharge: HOME | End: 2023-01-18
Attending: PSYCHIATRY & NEUROLOGY
Payer: COMMERCIAL

## 2023-01-18 PROCEDURE — H0035 MH PARTIAL HOSP TX UNDER 24H: HCPCS | Mod: GT,95 | Performed by: COUNSELOR

## 2023-01-18 PROCEDURE — H0035 MH PARTIAL HOSP TX UNDER 24H: HCPCS | Mod: GT,95

## 2023-01-18 PROCEDURE — H0035 MH PARTIAL HOSP TX UNDER 24H: HCPCS | Mod: GT,95 | Performed by: OCCUPATIONAL THERAPIST

## 2023-01-18 ASSESSMENT — COLUMBIA-SUICIDE SEVERITY RATING SCALE - C-SSRS
2. HAVE YOU ACTUALLY HAD ANY THOUGHTS OF KILLING YOURSELF?: YES
SUICIDE, SINCE LAST CONTACT: NO
LETHALITY/MEDICAL DAMAGE CODE MOST LETHAL ACTUAL ATTEMPT: MINOR PHYSICAL DAMAGE
REASONS FOR IDEATION SINCE LAST CONTACT: COMPLETELY TO END OR STOP THE PAIN (YOU COULDN'T GO ON LIVING WITH THE PAIN OR HOW YOU WERE FEELING)
TOTAL  NUMBER OF INTERRUPTED ATTEMPTS SINCE LAST CONTACT: NO
5. HAVE YOU STARTED TO WORK OUT OR WORKED OUT THE DETAILS OF HOW TO KILL YOURSELF? DO YOU INTEND TO CARRY OUT THIS PLAN?: NO
ATTEMPT SINCE LAST CONTACT: NO
1. SINCE LAST CONTACT, HAVE YOU WISHED YOU WERE DEAD OR WISHED YOU COULD GO TO SLEEP AND NOT WAKE UP?: YES
LETHALITY/MEDICAL DAMAGE CODE MOST LETHAL POTENTIAL ATTEMPT: BEHAVIOR LIKELY TO RESULT IN DEATH DESPITE AVAILABLE MEDICAL CARE
6. HAVE YOU EVER DONE ANYTHING, STARTED TO DO ANYTHING, OR PREPARED TO DO ANYTHING TO END YOUR LIFE?: NO
MOST LETHAL DATE: 66471
TOTAL  NUMBER OF ABORTED OR SELF INTERRUPTED ATTEMPTS SINCE LAST CONTACT: NO

## 2023-01-18 NOTE — GROUP NOTE
Psychoeducation Group Note    PATIENT'S NAME: Jossy Matthews  MRN:   [id number]  :   1995  ACCT. NUMBER: [id number]  DATE OF SERVICE: 23  START TIME:  2:00 PM  END TIME:  2:50 PM  FACILITATOR: Ra Lopez RN  TOPIC:  Wellness Group: Brain Health                                    Service Modality:  Video Visit     Telemedicine Visit: The patient's condition can be safely assessed and treated via synchronous audio and visual telemedicine encounter.      Reason for Telemedicine Visit: Services only offered telehealth    Originating Site (Patient Location): Patient's home    Distant Site (Provider Location): Provider Remote Setting- Home Office    Consent:  The patient/guardian has verbally consented to: the potential risks and benefits of telemedicine (video visit) versus in person care; bill my insurance or make self-payment for services provided; and responsibility for payment of non-covered services.     Patient would like the video invitation sent by:  My Chart    Mode of Communication:  Video Conference via Medical Zoom    As the provider I attest to compliance with applicable laws and regulations related to telemedicine.                           [facility] Adult Partial Hospitalization Program  TRACK: [facility]    NUMBER OF PARTICIPANTS: 9    Summary of Group / Topics Discussed:   Brain Health:  Pathophysiology of stress and anxiety: Patients were educated on the difference between stress, chronic stress, and anxiety. The anatomy and pathophysiology of the body/brain were reviewed to illustrate the immediate effects of stress/anxiety in the body and the long term effects and increased risks for chronic disease that come from unmanaged stress/anxiety. Self-coping strategies to manage symptoms of stress were reviewed and pharmacologic, psychotherapeutic, and complementary treatment options were discussed.    Patient Session Goals / Objectives:  ? Described the differences between stress  and anxiety and how the body responds to it  ? Listed the long term effects and increased risks for chronic disease that can arise from unmanaged stress/anxiety  ? Identified and described pharmacologic, psychotherapeutic, and complementary treatment options      Patient Participation / Response:  Fully participated with the group by sharing personal reflections / insights and openly received / provided feedback with other participants.    Demonstrated understanding of topics discussed through group discussion and participation    Treatment Plan:  Patient has See Epic Treatment Plan - Patient is discharging.    Ra Lopez RN

## 2023-01-18 NOTE — GROUP NOTE
Psychotherapy Group Note    PATIENT'S NAME: Jossy Matthews  MRN:   [id number]  :   1995  ACCT. NUMBER: [id number]  DATE OF SERVICE: 23  START TIME: 10:00 AM  END TIME: 10:50 AM  FACILITATOR: Radha Solano LPCC  TOPIC:  EBP Group: Coping Skills  [facility] Adult Partial Hospitalization Program  TRACK: [facility]    NUMBER OF PARTICIPANTS: 7    Summary of Group / Topics Discussed:  Coping Skills: Radical Acceptance: Patients learned radical acceptance as a way to tolerate heightened stress in the moment.  Patients identified situations that necessitate radical acceptance.  They focused on applying acceptance of the moment to tolerate difficult emotions and events. Patients discussed how to distinguish when this can be useful in their lives and when other skills are more relevant or helpful.    Patient Session Goals / Objectives:    Understand that some amount of pain exists for each of us in our lives    Process the difficulty of acceptance in our lives and benefits of radical acceptance to mood and functioning.    Demonstrate understanding of when to use the radical acceptance to tolerate distress by providing examples of situations where this could be applied.    Identify barriers to applying radical acceptance to difficult situations and explore strategies to overcome them                                    Service Modality:  Video Visit     Telemedicine Visit: The patient's condition can be safely assessed and treated via synchronous audio and visual telemedicine encounter.      Reason for Telemedicine Visit: Services only offered telehealth    Originating Site (Patient Location): Patient's home    Distant Site (Provider Location): Provider Remote Setting- Home Office    Consent:  The patient/guardian has verbally consented to: the potential risks and benefits of telemedicine (video visit) versus in person care; bill my insurance or make self-payment for services provided; and responsibility  for payment of non-covered services.     Patient would like the video invitation sent by:  My Chart    Mode of Communication:  Video Conference via Medical Zoom    As the provider I attest to compliance with applicable laws and regulations related to telemedicine.                         '        Patient Participation / Response:  Minimally participated, only when prompted / asked.    Demonstrated understanding of topics discussed through group discussion and participation and Expressed understanding of the relevance / importance of coping skills at distressing times in life    Treatment Plan:  Patient has See Epic Treatment Plan - Patient is discharging.    Radha Solano, [facility]

## 2023-01-19 ENCOUNTER — TELEPHONE (OUTPATIENT)
Dept: BEHAVIORAL HEALTH | Facility: CLINIC | Age: 28
End: 2023-01-19
Payer: COMMERCIAL

## 2023-01-19 NOTE — GROUP NOTE
Psychotherapy Group Note    PATIENT'S NAME: Jossy Matthews  MRN:   [id number]  :   1995  ACCT. NUMBER: [id number]  DATE OF SERVICE: 23  START TIME:  1:00 PM  END TIME:  1:50 PM  FACILITATOR: Alex Taylor LMFT  TOPIC:  EBP Group: Social Support  [facility] Adult Partial Hospitalization Program  TRACK: [facility]    NUMBER OF PARTICIPANTS: 9    Summary of Group / Topics Discussed:  Social Support:  Building and communicating with individual therapy supports: The patients engaged in a discussion of how locate, connect with and communicate with individual therapy providers     Patient Session Goals / Objectives:    Identify resources for locating individual therapists    Identify ways that individual therapy can assist with recovery.     Identify ways to communicate needs to an individual therapist    Service Modality:  Video Visit     Telemedicine Visit: The patient's condition can be safely assessed and treated via synchronous audio and visual telemedicine encounter.      Reason for Telemedicine Visit: Services only offered telehealth    Originating Site (Patient Location): Patient's home    Distant Site (Provider Location): Provider Remote Setting- Home Office    Consent:  The patient/guardian has verbally consented to: the potential risks and benefits of telemedicine (video visit) versus in person care; bill my insurance or make self-payment for services provided; and responsibility for payment of non-covered services.     Patient would like the video invitation sent by:  Lookback and Email    Mode of Communication:  Video Conference via Medical Zoom        Patient Participation / Response:  Moderately participated, sharing some personal reflections / insights and adequately adequately received / provided feedback with other participants.    Appeared to understand group discussion topic.    Treatment Plan:  Patient has See Epic Treatment Plan - Patient is discharging.    LIAM Navarro

## 2023-01-19 NOTE — GROUP NOTE
OT Clinic Group Note    PATIENT'S NAME: Jossy Matthews  MRN:   [id number]  :   1995  ACCT. NUMBER: [id number]  DATE OF SERVICE: 23  START TIME: 11:00 AM  END TIME: 11:50 AM  FACILITATOR: Natlaie Harper OTR/L  TOPIC: [facility] OT Group: Occupational Therapy Clinic  [facility] Adult Partial Hospitalization Program  TRACK: 1    NUMBER OF PARTICIPANTS: 8                                      Service Modality:  Video Visit     Telemedicine Visit: The patient's condition can be safely assessed and treated via synchronous audio and visual telemedicine encounter.      Reason for Telemedicine Visit: Services only offered telehealth    Originating Site (Patient Location): Patient's home    Distant Site (Provider Location): [facility] Outpatient Setting: [facility]    Consent:  The patient/guardian has verbally consented to: the potential risks and benefits of telemedicine (video visit) versus in person care; bill my insurance or make self-payment for services provided; and responsibility for payment of non-covered services.     Patient would like the video invitation sent by:  My Chart    Mode of Communication:  Video Conference via Medical Zoom    As the provider I attest to compliance with applicable laws and regulations related to telemedicine.      Summary of Group / Topics Discussed:  Occupational Therapy Clinic: Provided opportunity for patients to independently choose and engage in a therapeutic activity that supports progress towards their goals and psychiatric recovery. The Occupational Therapy Clinic provides a context for patients to monitor their symptoms, gain self-awareness, practice skills (self-regulation, mindfulness, self-talk, focus/concentration, social, productivity), build a sense of self efficacy and mastery, as well as receive validation, support, and resources. OT checks in, observes, assesses, and monitors performance skills and patterns in context with each  group member. Patients were provided orientation to the virtual OT clinic and overview of purpose of the OT clinic in support of meeting their goals.     Patient Session Goals / Objectives:    Independently identify a purposeful and meaningful therapeutic activity.    Identified which goal(s) they are intentionally working on during session.     Reflected on their performance and share insight about their progress.    Practiced and identified a way to generalize a skill to their everyday life      Patient Participation / Response:  Fully participated with the group by sharing personal reflections / insights and openly received / provided feedback with other participants.    Patient presentation: reported getting distracted and not following through with planned task; will work on that task later; congruent affect observed; no safety concerns reported, Verbalized understanding of content and Patient would benefit from additional opportunities to practice the content to be able to generalize it to their everyday life with increased intentionality, consistency, and efficacy in support of their psychiatric recovery    Treatment Plan:  Patient has See Epic Treatment Plan - Patient is discharging.  See discharge summary for more information.     Natalie Harper, OTR/L

## 2023-01-19 NOTE — GROUP NOTE
Process Group Note    PATIENT'S NAME: Jossy Matthews  MRN:   [id number]  :   1995  ACCT. NUMBER: [id number]  DATE OF SERVICE: 23  START TIME:  9:00 AM  END TIME:  9:50 AM  FACILITATOR: Alex Taylor LMFT  TOPIC:  Process Group    Diagnoses:  1. GURU (generalized anxiety disorder)   2. Major depressive disorder, recurrent episode, severe with mixed features (H)       [facility] Adult Partial Hospitalization Program  TRACK: [facility]    NUMBER OF PARTICIPANTS: 4    Service Modality:  Video Visit     Telemedicine Visit: The patient's condition can be safely assessed and treated via synchronous audio and visual telemedicine encounter.      Reason for Telemedicine Visit: Services only offered telehealth    Originating Site (Patient Location): Patient's home    Distant Site (Provider Location): Provider Remote Setting- Home Office    Consent:  The patient/guardian has verbally consented to: the potential risks and benefits of telemedicine (video visit) versus in person care; bill my insurance or make self-payment for services provided; and responsibility for payment of non-covered services.     Patient would like the video invitation sent by:  Rezora and Email    Mode of Communication:  Video Conference via Medical Zoom          Data:    Session content: At the start of this group, patients were invited to check in by identifying themselves, describing their current emotional status, and identifying issues to address in this group.   Area(s) of treatment focus addressed in this session included Symptom Management, Personal Safety and Community Resources/Discharge Planning.  Patient reported feeling  overwhelmed and Anxious.    Patient discussed working toward exercise and taking a shower.   For skills they will use to address their goal(s), patient identified opposite action.   A barrier to working toward their goal(s) and/or addressing mental health symptoms the patient identified was anxiety and  feeling down.  Patient reported passive SI with no plan or intent to act on the SI and committed to following her safety plan to stay safe. Jossy reported when she has SI her family steps in to help her, however, she is uncertain about her ability to use skills on her own.  Patient reported no substance use. Patient reported they are taking their medications as prescribed.   Patient reported feeling proud/grateful for family and friends.    Therapeutic Interventions/Treatment Strategies:  Psychotherapist offered support, feedback and validation. Treatment modalities used include Cognitive Behavioral Therapy. Interventions include Coping Skills: Facilitated understanding of  what factors may contribute to symptom relapse and skills plan to manage symptom relapse  and Symptoms Management: Promoted understanding of their diagnoses and how it impacts their functioning.    Assessment:    Patient response:   Patient responded to session by accepting feedback, giving feedback, listening, focusing on goals, being attentive and accepting support    Possible barriers to participation / learning include: Severity of Symptoms    Health Issues:   None reported       Substance Use Review:   Substance Use: No active concerns identified.    Mental Status/Behavioral Observations  Appearance:   Appropriate   Eye Contact:   Reduced, occasionally walking away from the camera  Psychomotor Behavior: Normal   Attitude:   Cooperative   Orientation:   All  Speech   Rate / Production: Normal    Volume:  Normal   Mood:    Anxious Depressed  Affect:    Worrisome  Thought Content:   Clear and Safety reports recent presence of suicidal ideation passive suicidal ideation  Thought Form:  Coherent  Logical     Insight:    Good     Plan:     Safety Plan: Committed to safety and agreed to follow previously developed safety coping plan.      Barriers to treatment: None identified    Patient Contracts (see media tab):  None    Substance Use: Provided  encouragement towards sobriety     Continue or Discharge: Patient is being discharged today. See Treatment Plan and Discharge Summary.       Alex Taylor, LMFT  January 18, 2023

## 2023-01-19 NOTE — TELEPHONE ENCOUNTER
PHP therapist called Jossy and informed her therapist sent her some additional DBT resources by email.  Therapist also asked Jossy if she could talk to her family if about suicidal thoughts if she needed more support or ask them to put things out of reach such as car keys if she had thoughts of doing harmful things with them, which she agreed to she would do.    Alex Taylor, LMFT on 1/19/2023 at 4:45 PM

## 2023-01-23 ENCOUNTER — VIRTUAL VISIT (OUTPATIENT)
Dept: BEHAVIORAL HEALTH | Facility: CLINIC | Age: 28
End: 2023-01-23
Payer: COMMERCIAL

## 2023-01-23 DIAGNOSIS — F41.1 GAD (GENERALIZED ANXIETY DISORDER): Primary | ICD-10-CM

## 2023-01-23 DIAGNOSIS — F33.1 MODERATE EPISODE OF RECURRENT MAJOR DEPRESSIVE DISORDER (H): ICD-10-CM

## 2023-01-23 DIAGNOSIS — G47.9 SLEEP DISTURBANCES: ICD-10-CM

## 2023-01-23 PROCEDURE — 99205 OFFICE O/P NEW HI 60 MIN: CPT | Mod: 95 | Performed by: NURSE PRACTITIONER

## 2023-01-23 RX ORDER — GABAPENTIN 100 MG/1
100 CAPSULE ORAL 2 TIMES DAILY
Qty: 60 CAPSULE | Refills: 1 | Status: SHIPPED | OUTPATIENT
Start: 2023-01-23 | End: 2023-02-06

## 2023-01-23 RX ORDER — TRAZODONE HYDROCHLORIDE 100 MG/1
100-200 TABLET ORAL
Qty: 60 TABLET | Refills: 0 | Status: SHIPPED | OUTPATIENT
Start: 2023-01-23 | End: 2023-02-06

## 2023-01-23 RX ORDER — SERTRALINE HYDROCHLORIDE 100 MG/1
200 TABLET, FILM COATED ORAL DAILY
Qty: 180 TABLET | Refills: 0 | Status: SHIPPED | OUTPATIENT
Start: 2023-01-23 | End: 2023-03-08

## 2023-01-23 ASSESSMENT — ANXIETY QUESTIONNAIRES
4. TROUBLE RELAXING: NEARLY EVERY DAY
1. FEELING NERVOUS, ANXIOUS, OR ON EDGE: NEARLY EVERY DAY
2. NOT BEING ABLE TO STOP OR CONTROL WORRYING: NEARLY EVERY DAY
GAD7 TOTAL SCORE: 14
5. BEING SO RESTLESS THAT IT IS HARD TO SIT STILL: SEVERAL DAYS
6. BECOMING EASILY ANNOYED OR IRRITABLE: NOT AT ALL
3. WORRYING TOO MUCH ABOUT DIFFERENT THINGS: NEARLY EVERY DAY
GAD7 TOTAL SCORE: 14
IF YOU CHECKED OFF ANY PROBLEMS ON THIS QUESTIONNAIRE, HOW DIFFICULT HAVE THESE PROBLEMS MADE IT FOR YOU TO DO YOUR WORK, TAKE CARE OF THINGS AT HOME, OR GET ALONG WITH OTHER PEOPLE: SOMEWHAT DIFFICULT
7. FEELING AFRAID AS IF SOMETHING AWFUL MIGHT HAPPEN: SEVERAL DAYS

## 2023-01-23 ASSESSMENT — PATIENT HEALTH QUESTIONNAIRE - PHQ9: SUM OF ALL RESPONSES TO PHQ QUESTIONS 1-9: 18

## 2023-01-23 NOTE — PROGRESS NOTES
"[facility]    Transition Clinic: Psychiatry Note  Medication Management              Charts/documentation reviewed within EMR:  -12/29/2022 x3        VISIT INFORMATION    Date:  2023     Number:  -Initial     Referral source:  -Inpatient psychiatry      Patient Identifying Information:  Legal name: Jossy Matthews  Preferred name: Jossy  : 1995  Preferred pronouns:  She/her      Participants:   -Patient  -Provider  -Mother          Telehealth visit details:  Type of service:  Video  Patient location:  At home, Off site  Provider Location:  [facility] Addiction Service Line  Platform utilized:  Bantr    Start time: 2:40 pm  End time:  3:37 pm      HPI    Hx:  -MDD  -Multiple anxiety disorders  -Recent suicide attempt by hanging  -Inpatient psychiatric hospitalization  -Attended PHP through [facility]  -Living in parents household    -----------------------    -Have a headache daily ranging from 30 minutes to 4 hours       PSYCHIATRIC ROS    Sleep:   -Can't sleep without the Trazodone  -Wake up in the middle of the night and have take 50 mg  -Don't like how I need it to sleep        Appetite/Weight Changes:   -Denies unintentional loss or gain > 10 lbs in the past 4 weeks    ------------------    -So, so  -Not eating a ton        Energy Levels:   -4/10      Depression/Anxiety:   -Anxiety > Depression  -Racing thoughts, easily overwhelmed  -Disagree with Dr. Banda's dx of \"mixed state\" for depression ... I wasn't manic and I'm not Bipolar      Suicidal ideations:   +Passive ideations occur regularly      SIB:  -Denies current engagement of self harm       Side effects:  Gabapentin 300 mg TID:   -Feel out of it + weird  -Per mom: eyes were glazed off + seemed zoned out        MENTAL HEALTH HISTORY      Individual therapy or IOP:   -Individual and currently attending [facility] IOP      Suicide attempts:  -1x by hanging  -Most recent: " 12/28/2022      Inpatient psychiatric hospitalizations:  -1x  -Most recent: [facility], [facility] 12/29/2022 to 1/3/2023  -No hx of commitments       ECT:  -None reported         Medication Trials:      SSRIs:  -Lexapro: worsened anxiety + suicidal ideations, sweating    Other anxiolytics:  -Buspar 10 mg three times daily: discontinued on own  -Gabapentin 300 mg three times daily: felt weird, out of it  -Hydroxyzine 50 mg every 4 hours: sleepy        SUBSTANCE USE    Prior use:  -Denies any history of alcohol, recreational, or illicit substance use resulting in: legal issues, c/d programming, or withdrawal symptoms        Current use:    Alcohol:   -None reported       Recreational Drugs:   -None reported       Medical Marijuana:  -None reported       Cigarettes per day:   -None reported       Chewing tobacco:   -None reported       Vaping:    -None reported       Caffeine intake:    -None reported ... worsens anxiety             SOCIAL HISTORY  -Was reviewed within the EMR per: 12/29/2022 encounter by CELIA Gorman          MEDICAL HISTORY    Current:  -The problem list was reviewed prior to the appointment        Developmental:   -Mother had normal pregnancy: Yes  -Met age appropriate milestones: Yes  -Participated in special education classes and or had an IEP: No  -Hx of autism spectrum disorder, learning disability, and or other cognitive disorder: No      Neurological:  -Denies any hx of: seizures, concussions, or TBI        MEDICATIONS      Current Outpatient Medications:      aspirin-acetaminophen-caffeine (EXCEDRIN EXTRA STRENGTH) 250-250-65 MG tablet, Take 1 tablet by mouth daily as needed for headaches, Disp: , Rfl:      busPIRone (BUSPAR) 10 MG tablet, Take 1 tablet (10 mg) by mouth 3 times daily (Patient not taking: Reported on 1/13/2023), Disp: 90 tablet, Rfl: 0     gabapentin (NEURONTIN) 300 MG capsule, Take 1 capsule (300 mg) by mouth 3 times daily (Patient not taking: Reported on 1/9/2023), Disp: 90  capsule, Rfl: 0     hydrOXYzine (ATARAX) 50 MG tablet, Take 1 tablet (50 mg) by mouth every 4 hours as needed for anxiety (Patient not taking: Reported on 1/5/2023), Disp: 30 tablet, Rfl: 0     melatonin 5 MG tablet, Take 1 tablet (5 mg) by mouth nightly as needed for sleep (Patient not taking: Reported on 1/5/2023), Disp: 30 tablet, Rfl: 0     multivitamin w/minerals (CENTRUM ADULTS) tablet, Take 1 tablet by mouth daily, Disp: , Rfl:      norethindrone-ethinyl estradiol (JUNEL FE 1/20) 1-20 MG-MCG tablet, Take 1 tablet by mouth daily, Disp: , Rfl:      sertraline (ZOLOFT) 100 MG tablet, Take 1 tablet (100 mg) by mouth daily, Disp: 30 tablet, Rfl: 0     traZODone (DESYREL) 100 MG tablet, Take 1-2 tablets (100-200 mg) by mouth nightly as needed for sleep, Disp: 60 tablet, Rfl: 0          If a controlled substance has been prescribed during the appointment:    -The [place] Prescription Monitoring Program has been reviewed and there are no current concerns with: diversionary activity, early refill requests, and or obtaining the medication from multiple providers.          VITALS    BP Readings from Last 3 Encounters:   01/03/23 110/80       Pulse Readings from Last 3 Encounters:   01/03/23 90       Wt Readings from Last 3 Encounters:   12/30/22 76.7 kg (169 lb)               LABS    The following have been reviewed prior to or during the appointment:  -12/29/2022          SCALES      PHQ 1/3/2023   PHQ-9 Total Score 16   Q9: Thoughts of better off dead/self-harm past 2 weeks More than half the days        GURU-7 SCORE 1/5/2023 1/5/2023 1/5/2023   Total Score - - 11 (moderate anxiety)   Total Score 11 11 11                MENTAL STATUS EXAMINATION    Appearance: Adequately Groomed, Attire Appropriate for the Season  General Behavior:  Cooperative, Direct Eye Contact  Speech: Fluent, Normal rate and volume  Musculoskeletal:    -Gait not observed during t.h. visit  -No facial tics/tremors observed   -Motor coordination  is grossly intact   Mood:  Anxious   Affect: Congruent with mood + intermittently frustrated  Attention: Intact  Orientation:  Person, Place, Time, Situation  Thought Associations:  Intact  Thought Content: Reality based   Thought Processes: Organized, Normal rate  Memory: No overt impairment; no screenings or formal testing performed  Language: Intact  Judgement: Fair  Insight: Fair to good        ASSESSMENT/CLINICAL IMPRESSIONS    Summary:    Jossy Matthews is a 26 y/o female with a history of: MDD, Multiple Anxiety Disorders.    Is attending today's appointment with her mother for the management of psychotropics/bridging until able to establish long term outpatient psychiatric services.    Recently underwent first inpatient psychiatric hospitalization at [facility] 12/29/2022 to 1/3/2023 due to a suicide attempt via hanging.    Since discharge from the above iph has been attending PHP programing through [facility] and discontinued several of the medications due to either ineffectiveness or side effects.    Throughout the appointment is mildly anxious/demonstrates anticipatory anxiety about eventually returning to work and how necessary accommodations will be implemented by her employer.    Will trial a lower dosage of Gabapentin to see if 1) it is better tolerated than 300 mg TID 2) to target ongoing GURU symptoms + skin picking/hair pulling.    Continues to experience passive suicidal ideations but denies current intent or plan.  Safety plan reviewed (see below) and mental health crisis resources provided (see AVS).         DSM-V and or working diagnosis:    1. GURU    2. Moderate to Severe episode of recurrent major depressive disorder (H)    3. Sleep Disturbances     History of:    4. Social Phobia    5. Panic Disorder    6. Trichtillomania         SAFETY EVALUATION:  Suicidal ideations:  +passive si  -denies current intent or plan  Homicidal ideations:  -denies  Risk factors:  -recent attempt, leading to iph  -ongoing  psychosocial stressors  Protective and mitigating factors:  -parents  -engagement in outpatient mental health services  Risk assessment:  -medium      SAFETY PLAN:  -Has numbers of at least 1 family member + 1 friend in personal contact list  -Knows clinic number(s) + operation of regular business hours   -Reviewed to utilize 986 or text [place] to [phone] for mental health crisis  -Discussed to call 911 and or return to the nearest Emergency Department if unable to maintain safety of self or others (due to severity of suicidal and or homicidal ideations)          TREATMENT PLAN      Medications:  Start:  Gabapentin/Neurontin 100 mg twice daily; TDD = 200 mg    Continue:  Sertraline/Zoloft 200 mg daily    Trazodone/Desyrel 100 to 200 mg (1 to 2 tabs) as needed at bedtime  -Take 100 mg nightly  -Can take an additional 50 to 100 mg in the middle of the night if needed      Labs:  -None Obtained        Therapy and or Non-pharmacological modalities:  -Continue [facility] Day [facility] + and or individual therapy        Return to Clinic or Referrals:  -Please follow up at the Transition Clinic for medication management in: 2 to 3 weeks          Total time:  78 minutes per:    -Review of EMR   -Appointment time  -Documentation           Sunni BRAGG-CNP,  [facility]          ----------------------------------------------------------------------------------------------------------------------        TREATMENT RISK STATEMENT    The risks, benefits, alternatives, and potential adverse effects have been explained and are understood by the patient.  The patient agrees to the treatment plan with their ability to do so.      The patient knows to call the clinic: [phone]  for any problems or concerns until the next psychiatry visit, regardless if it is within or outside of the [facility] system.     If unable to reach clinic staff (via phone call or medical messaging) during the normal business hours: 8:00 am to 4:30 pm then it  is recommended accessing the nearest: emergency department, urgent care facility, or utilizing local (varies based on county of residence) and national crisis #'s or text messaging services for immediate assistance.          ----------------------------------------------------------------------------------------------------------------------        If applicable the following has been discussed with the patient, parent/guardian, and or attending family member during the appointment:      1. Risks of polypharmacy and possible drug interactions with current medication list + common OTC products, herbs, and supplements.    Moving forward, it is suggested to intermittently check-in with a clinic or retail pharmacist whenever new medications or OTC/h/s are consumed.    2. Recommendation to adhere to CDC guidelines as it relates alcohol consumption.  If taking benzodiazepines, you should abstain from alcohol intake due to increased risks of CNS and respiratory depression, as well as psychomotor impairment.    3. If possible, it is recommended to avoid concurrent use of prescribed:  opioids  +  benzodiazepines due to increased risks of CNS and respiratory depression, as well as the increased risk of overdose.     4. Recommendation to minimize and or abstain from THC use (unless the pt. is prescribed medical marijuana).    5. Recommendation to abstain from illicit substances including but not limited to the following: heroin, street fentanyl, cocaine, methamphetamines, and bath salts.    6. Do not take opioids, stimulants, and or other prescription medications unless they are specifically prescribed for you.    7. Recommendation to abstain from: alcohol,  tobacco/smoking, vaping, THC, and all illicit substances if trying to become or are pregnant.    8. Black Box Warnings associated with the prescribed psychotropic(s).    9. Potential adverse effects of antipsychotics including but not limited to the following: weight gain,  metabolic syndrome, EPS/Tardive Dyskinesias.    10. Potential CV and neurological adverse effects of stimulants including but not limited to the following:  sudden death, MI, stroke, HTN, cardiomyopathy (long term use) as well as seizures.

## 2023-01-23 NOTE — PATIENT INSTRUCTIONS
Start:  Gabapentin/Neurontin 100 mg twice daily; TDD = 200 mg    Continue:  Sertraline/Zoloft 200 mg daily    Trazodone/Desyrel 100 to 200 mg (1 to 2 tabs) as needed at bedtime  -Take 100 mg nightly  -Can take an additional 50 to 100 mg in the middle of the night if needed                  If you or someone you know is experiencing a mental health crisis contributing to immediate danger that is life threatening: call 911 and or go to the nearest emergency department.    ----------------------------------------------------------------------------------------------------------      Call **CRISIS (**[phone]) from a cell phone anywhere in the [facility] to reach the local [facility] crisis team.      ----------------------------------------------------------------------------------------------------------      Please use the following websites to obtain a comprehensive list of all counties within the [facility] for adult and child mental health crisis numbers:    https://[place].[facility]/dhs/people-we-serve/people-with-disabilities/health-care/adult-mental-health/resources/crisis-contacts.jsp    https://[place].gov[place]/people-we-serve/people-with-disabilities/health-care/childrens-mental-health/resources/crisis-contacts.jsp      Below is a list of county (also found on the above websites), state, and national crisis numbers.   These resources can provide real-time assistance for moderately severe mental health symptoms.    Additionally, please visit your county website to find the most up-to-date list of local:  adult, child, family, and chemical dependency services available.          [facility]  [phone]      [facility]  [phone]      [facility]  [phone]      [facility]  [phone]      [facility]  [phone]: Adults 18 and over    [phone]: Children 17 and under    [facility] ([facility]), Acute Psychiatric Services (APS) Assessment & Referral:   [phone]    Community Outreach  for Psychiatric Emergencies (COPE):  [phone]      [facility]  [phone]: Adults 18 and over    [phone]: Children 17 and under      Walk-in crisis services are available Monday to Friday from 8 am to 5:30 pm at Urgent Care for  Adult metal health:    [address] [place] [address]  Closed on Saturday, Sunday, and holidays      [place]  [phone]: Toll free      [facility]  [phone]      [facility] [place]  [phone]: Toll free    Text: [place] to 819830      [facility] and human services  Call 211 or visit [email or web address]://www.[email or web address] to obtain free and confidential h/hs information       [place] [facility]  If you are an adult needing support, you can talk to a specialist who has first hand experience living with a mental health condition:    [phone]    Text: Support to 27391      [facility] [place]:  domestic violence/LGBTQ+  [phone] option 3      [facility] [employer] [facility]: LGBTQ+  3-497-309-9945    Text: START to 848626       Resources  8-981-XqupKdp: (1-249.554.3171)    Crisis line: 1-743.434.7322    Text: 125575      [employer]: women and sexually exploited youth  525.359.3363      [employer] Domestic Violence Shelter Hotline  524.802.3942      National Suicide Prevention Lifeline  872    National Youth Crisis Hotline  566

## 2023-01-23 NOTE — PROGRESS NOTES
". This video/telephone visit will be conducted virtually between you and your provider. We have found that certain health care needs can be provided without the need for an in-person physical exam. This service lets us provide the care you need with a video /telephone conversation. If a prescription is necessary we can send it directly to your pharmacy. If lab work is needed we can place an order for that and you can then stop by our lab to have the test done at a later time.\"   Just as we bill insurance for in-person visits, we also bill insurance for video/telephone visits. If you have questions about your insurance coverage, we recommend that you speak with your insurance company.      Patient/Parent has given verbal consent for video/Telephone visit? Yes    Patient would like the video visit invitation sent by: Lex if connection issue: [phone]    Patient verified allergies, medications and pharmacy via phone. Patient states they are ready for visit.      Mental Health &Addiction (MH&A)Transition Clinic (TC):     Provides Patient Support While Waiting to Access Programmatic and Outpatient MH&A Care and Provides Select Crisis Assessment Services     INTAKE  ____________________________________________________    \"The Transition Clinic is a temporary psychiatry service that helps to bridge the time to your next appointment. It is not intended to be a long-term service and you are expected to attend your scheduled appointment with your next provider.\"  [x] Patient/Parent verbalized understanding    If you need support between appointments, please call [phone] and let them know you're seen by [facility] Psychiatry. You may also send a Ticketbud message to reach us.    General-     Most pressing MH needs at this time:  Confirm medications possible refills   Mom is also present and Jossy is ok with Mom being present.     Any physical health conditions or diagnoses we should be aware of or that are " impacting you: Denies       Medications-     Injectable medications currently prescribed: No   If yes, do you need an appointment for the next injection:     Any Controlled Substances that you are prescribed: NA       Primary care provider: Physician No Ref-Primary        GURU-7 scores:    GURU-7 SCORE 1/5/2023 1/23/2023   Total Score 11 (moderate anxiety) -   Total Score 11 14       PHQ-9 scores:   PHQ-9 SCORE 1/3/2023 1/23/2023   PHQ-9 Total Score 16 18       Anything the provider should be aware of for today's appointment: Refills are needed and discuss future.     New (awaiting) Mental health provider or next programming: Ofelia Bagley MD Psychiatry Resident.    Location [facility] [address] [place] [address] [address]; [place], [place] [address] [phone]      Date of scheduled apt: 3/8/2023 at 1:30pm in-person         Maria G Diamond CMA on January 23, 2023 at 2:32 PM

## 2023-02-04 NOTE — PROGRESS NOTES
[facility]      Mental Health & Addiction Service Line    [facility]: Psychiatry Progress Note  Medication Management                Charts/documentation reviewed within the EMR:  -2023    -2023          VISIT INFORMATION      Date:  2023       Number:  -2nd      Referral source:  -Inpatient psychiatry      Patient Identifying Information:  Legal name: Jossy Matthews  Preferred name: Jossy  : 1995  Preferred pronouns: She/her      Participants:   -Patient  -Provider  -Parents        Telehealth visit details:  Type of service:   Video  Patient location:   At home, Off site  Provider Location: [facility] Addiction Service Line  Platform utilized: [email or web address]    Start time: 1:37 pm  End time:  2:08 pm          INTERIM HX    -Gabapentin isn't doing anything    Per father: Hever  -I snooped on her phone  -Continues to look up ways to hurt self which is concerning  -Also is very unmotivated, apathetic           PSYCHIATRIC ROS      Sleep:  -Still can't fall asleep without the Trazodone  -Getting around 7 or 8 hours       Mood/Anxiety:  -See PHQ-9 score    -See GURU-7 score    -I guess depression symptoms are little but not that much better  -Ongoing issues with low self esteem and lacks sense of self          Suicidal ideations:  +Passive ideations  -Unclear intent ... looking up plans as noted above        SIB:  -Denies engaging in self harm         Side effects:  -None reported         PSYCHIATRIC HISTORY      Individual therapy or IOP:   -Individual and currently attending [facility] IOP        Suicide attempts:  -1x by hanging  -Most recent: 2022        Inpatient psychiatric hospitalizations:  -1x  -Most recent: [facility], [facility] 2022 to 1/3/2023  -No hx of commitments         ECT:  -None reported            Medication Trials:        SSRIs:  -Lexapro: worsened anxiety + suicidal ideations, sweating     Other anxiolytics:  -Buspar 10 mg three times daily:  discontinued on own  Gabapentin   -100 mg twice daily: ineffective  -300 mg three times daily: felt weird, out of it  -Hydroxyzine 50 mg every 4 hours: sleepy        CURRENT SUBSTANCE USE    Alcohol:   -None reported         Recreational Drugs:   -None reported         Medical Marijuana:  -None reported         Cigarettes per day:   -None reported         Chewing tobacco:   -None reported         Vaping:    -None reported         Caffeine intake:    -None reported           MEDICAL HISTORY    -The problem list was reviewed via the EMR prior to the appointment    -The patient denies any concerning physical and or medical symptoms during the interviewing process          MEDICATIONS      Current Outpatient Medications:      aspirin-acetaminophen-caffeine (EXCEDRIN EXTRA STRENGTH) 250-250-65 MG tablet, Take 1 tablet by mouth daily as needed for headaches, Disp: , Rfl:      gabapentin (NEURONTIN) 100 MG capsule, Take 1 capsule (100 mg) by mouth 2 times daily, Disp: 60 capsule, Rfl: 1     multivitamin w/minerals (CENTRUM ADULTS) tablet, Take 1 tablet by mouth daily, Disp: , Rfl:      norethindrone-ethinyl estradiol (JUNEL FE 1/20) 1-20 MG-MCG tablet, Take 1 tablet by mouth daily, Disp: , Rfl:      sertraline (ZOLOFT) 100 MG tablet, Take 2 tablets (200 mg) by mouth daily, Disp: 180 tablet, Rfl: 0     traZODone (DESYREL) 100 MG tablet, Take 1-2 tablets (100-200 mg) by mouth nightly as needed for sleep, Disp: 60 tablet, Rfl: 0      If a controlled substance is prescribed during today's appointment:    -The [place] Prescription Monitoring Program has been reviewed and there are no current concerns with: diversionary activity, early refill requests, and or obtaining the medication from multiple providers.        VITALS    BP Readings from Last 3 Encounters:   01/03/23 110/80       Pulse Readings from Last 3 Encounters:   01/03/23 90       Wt Readings from Last 3 Encounters:   12/30/22 76.7 kg (169 lb)         LABS    The  following labs were reviewed prior to or during the appointment:  -None        SCALES    PHQ 1/3/2023 1/23/2023   PHQ-9 Total Score 16 18   Q9: Thoughts of better off dead/self-harm past 2 weeks More than half the days Several days        GURU-7 SCORE 1/5/2023 1/5/2023 1/23/2023   Total Score - 11 (moderate anxiety) -   Total Score 11 11 14               MENTAL STATUS EXAMINATION    Appearance: Adequately Groomed, Attire Appropriate for the Season  General Behavior:  Cooperative, Direct Eye Contact  Speech: Fluent, Normal rate and volume  Musculoskeletal:    -Gait not observed during t.h. visit  -No facial tics/tremors observed   -Motor coordination is grossly intact   Mood:  Depressed and Anxious   Affect: Mildly anxious + Apathetic  Attention: Intact  Orientation:  Person, Place, Time, Situation  Thought Associations:  Intact  Thought Content: Reality based   Thought Processes: Organized, Normal rate  Memory: No overt impairment; no screenings or formal testing performed  Language: Intact  Judgement: Fair  Insight: Fair to good          ASSESSMENT/CLINICAL IMPRESSIONS    Summary:    Jossy Matthews is a 28 y/o female with a history of: MDD, Multiple Anxiety Disorders.     Recently underwent first inpatient psychiatric hospitalization at [facility] 12/29/2022 to 1/3/2023 due to a suicide attempt via hanging.    Established care at the [facility] on 1/23/2023 for the management of psychotropics/bridging until able to establish long term outpatient psychiatric services.     --------------    Jossy continues to reside in her parent's household and is currently on a TYRONE from the workplace.       Didn't find Gabapentin 100 mg twice daily to be effective at reducing anxiety symptoms or improving Trichtillomania.   Doesn't want to further modify the dosing (previously 300 mg TID was to sedating), therefore instructed to discontinue.    Remains down, depressed, experiencing anhedonia, at times feelings of  hopelessness/helplessness, and has poor motivation to do chores, run errands, etc.  Her father outlines since discharge from the above iph, she's looked up ways to carry out suicide on her phone, which is very worrisome.   Jossy doesn't dispute this is something she continues to research.    Provided instructions to titrate Wellbutrin to 150 mg/day to target: dysphoric, depressed mood.     Reviewed safety plan and parents are aware to assist taking Jossy back to the nearest ED if necessary/demonstrates additional decompensation or further exacerbations of GURU + MDD dx.        DSM-V and or working diagnosis:    1. GURU     2. Moderate to Severe episode of recurrent major depressive disorder (H)     3. Sleep Disturbances      History of:     4. Social Phobia     5. Panic Disorder     6. Trichtillomania            SAFETY EVALUATION:  Suicidal ideations:  +ideations with plan  Homicidal ideations:  -denies  Risk factors:  -recent attempt, leading to iph  -ongoing psychosocial stressors  Protective and mitigating factors:  -parents  -engagement in outpatient mental health services  Risk assessment:  -medium      SAFETY PLAN:  -Has numbers of at least 1 family member + 1 friend in personal contact list  -Knows clinic number(s) + operation of regular business hours   -Reviewed to utilize 988 or text [place] to [phone] for mental health crisis  -Discussed to call 911 and or return to the nearest Emergency Department if unable to maintain safety of self or others (due to severity of suicidal and or homicidal ideations)        TREATMENT PLAN      Medications:  Start:  Gabapentin/Neurontin 100 mg twice daily; discontinue    Bupropion/Wellbutrin   -75 mg for 8 to 10 days in the AM  -150 mg XL in the AM thereafter    Continue:  Sertraline/Zoloft 200 mg daily     Trazodone/Desyrel 100 to 200 mg (1 to 2 tabs) as needed at bedtime  -Take 100 mg nightly  -Can take an additional 50 to 100 mg in the middle of the night if  needed          Labs:  -None obtained          Therapy and or Non-pharmacological modalities :  -Individual and or group (DBT) therapy recommended in addition to psychotropics          Return to Clinic or Referrals:  -Please follow up at the [facility] for medication management in:  2-3 weeks        Total time:  41 minutes per:    -Review of EMR  -Appointment time   -Documentation          [facility]    ----------------------------------------------------------------------------------------------------------------------        TREATMENT RISK STATEMENT    The risks, benefits, alternatives, and potential adverse effects have been explained and are understood by the patient.  The patient agrees to the treatment plan with their ability to do so.      The patient knows to call the clinic: [phone]  for any problems or concerns until the next psychiatry visit, regardless if it is within or outside of the [employer] system.     If unable to reach clinic staff (via phone call or medical messaging) during the normal business hours: 8:00 am to 4:30 pm then it is recommended accessing the nearest: emergency department, urgent care facility, or utilizing local (varies based on county of residence) and national crisis #'s or text messaging services for immediate assistance.          ----------------------------------------------------------------------------------------------------------------------        If applicable the following has been discussed with the patient, parent/guardian, and or attending family member during the appointment:      1. Risks of polypharmacy and possible drug interactions with current medication list + common OTC products, herbs, and supplements.    Moving forward, it is suggested to intermittently check-in with a clinic or retail pharmacist whenever new medications or OTC/h/s are consumed.    2. Recommendation to adhere to CDC guidelines as it relates alcohol  consumption.  If taking benzodiazepines, you should abstain from alcohol intake due to increased risks of CNS and respiratory depression, as well as psychomotor impairment.    3. If possible, it is recommended to avoid concurrent use of prescribed:  opioids  +  benzodiazepines due to increased risks of CNS and respiratory depression, as well as the increased risk of overdose.     4. Recommendation to minimize and or abstain from THC use (unless the pt. is prescribed medical marijuana).    5. Recommendation to abstain from illicit substances including but not limited to the following: heroin, street fentanyl, cocaine, methamphetamines, and bath salts.    6. Do not take opioids, stimulants, and or other prescription medications unless they are specifically prescribed for you.    7. Recommendation to abstain from tobacco/smoking, alcohol, THC, and all illicit substances if trying to become or are pregnant.    8. Black Box Warnings associated with the prescribed psychotropic(s).    9. Potential adverse effects of antipsychotics including but not limited to the following: weight gain, metabolic syndrome, EPS/Tardive Dyskinesias.    10. Potential CV and neurological adverse effects of stimulants including but not limited to the following:  sudden death, MI, stroke, HTN, cardiomyopathy (long term use) as well as seizures.

## 2023-02-05 ENCOUNTER — HEALTH MAINTENANCE LETTER (OUTPATIENT)
Age: 28
End: 2023-02-05

## 2023-02-06 ENCOUNTER — VIRTUAL VISIT (OUTPATIENT)
Dept: BEHAVIORAL HEALTH | Facility: CLINIC | Age: 28
End: 2023-02-06
Payer: COMMERCIAL

## 2023-02-06 DIAGNOSIS — G47.9 SLEEP DISTURBANCES: ICD-10-CM

## 2023-02-06 DIAGNOSIS — F33.1 MODERATE EPISODE OF RECURRENT MAJOR DEPRESSIVE DISORDER (H): ICD-10-CM

## 2023-02-06 DIAGNOSIS — F41.1 GAD (GENERALIZED ANXIETY DISORDER): Primary | ICD-10-CM

## 2023-02-06 PROCEDURE — 99215 OFFICE O/P EST HI 40 MIN: CPT | Mod: VID | Performed by: NURSE PRACTITIONER

## 2023-02-06 RX ORDER — BUPROPION HYDROCHLORIDE 75 MG/1
75 TABLET ORAL EVERY MORNING
Qty: 10 TABLET | Refills: 0 | Status: SHIPPED | OUTPATIENT
Start: 2023-02-06 | End: 2023-02-22 | Stop reason: DRUGHIGH

## 2023-02-06 RX ORDER — TRAZODONE HYDROCHLORIDE 100 MG/1
100-200 TABLET ORAL
Qty: 60 TABLET | Refills: 0 | Status: SHIPPED | OUTPATIENT
Start: 2023-02-06 | End: 2023-02-22 | Stop reason: DRUGHIGH

## 2023-02-06 RX ORDER — BUPROPION HYDROCHLORIDE 150 MG/1
150 TABLET ORAL EVERY MORNING
Qty: 30 TABLET | Refills: 0 | Status: SHIPPED | OUTPATIENT
Start: 2023-02-06 | End: 2023-02-22

## 2023-02-06 ASSESSMENT — PATIENT HEALTH QUESTIONNAIRE - PHQ9: SUM OF ALL RESPONSES TO PHQ QUESTIONS 1-9: 16

## 2023-02-06 ASSESSMENT — ANXIETY QUESTIONNAIRES
3. WORRYING TOO MUCH ABOUT DIFFERENT THINGS: NEARLY EVERY DAY
GAD7 TOTAL SCORE: 12
5. BEING SO RESTLESS THAT IT IS HARD TO SIT STILL: SEVERAL DAYS
2. NOT BEING ABLE TO STOP OR CONTROL WORRYING: MORE THAN HALF THE DAYS
7. FEELING AFRAID AS IF SOMETHING AWFUL MIGHT HAPPEN: NOT AT ALL
6. BECOMING EASILY ANNOYED OR IRRITABLE: NOT AT ALL
GAD7 TOTAL SCORE: 12
1. FEELING NERVOUS, ANXIOUS, OR ON EDGE: NEARLY EVERY DAY
4. TROUBLE RELAXING: NEARLY EVERY DAY

## 2023-02-06 NOTE — PROGRESS NOTES
" This video/telephone visit will be conducted virtually between you and your provider. We have found that certain health care needs can be provided without the need for an in-person physical exam. This service lets us provide the care you need with a video /telephone conversation. If a prescription is necessary we can send it directly to your pharmacy. If lab work is needed we can place an order for that and you can then stop by our lab to have the test done at a later time.\"   Just as we bill insurance for in-person visits, we also bill insurance for video/telephone visits. If you have questions about your insurance coverage, we recommend that you speak with your insurance company.      Patient/Parent has given verbal consent for video/Telephone visit? yes    Patient would like the video visit invitation sent by: [employer] .  If tech issues please text to cell phone: [phone]     Patient verified allergies, medications and pharmacy via phone. Patient states they are ready for visit.     Mental Health &Addiction ([facility]&A)[facility] (TC):     Provides Patient Support While Waiting to Access Programmatic and Outpatient MH&A Care and Provides [facility] Crisis Assessment Services     FOLLOW-UP VISIT  ____________________________________________      \"The Transition [facility] is a temporary psychiatry service that helps to bridge the time to your next appointment. It is not intended to be a long-term service and you are expected to attend your scheduled appointment with your next provider.\"  [x] Patient/Parent verbalized understanding    If you need support between appointments, please call [phone] and let them know you're seen by [facility]. You may also send a [employer] message to reach us.    General-     Most pressing needs at this time: pt would like to discuss the Gabapentin because it's not helping     Mental Health currently: not right    Any changes to your physical health: nothing new     "     Medications-     Injectable medications currently prescribed: None    If yes, do you need an appointment for the next injection: NA     Any Controlled Substances that you are prescribed: Gabapentin     Any refills you are aware that you'll need soon: possible Trazodone        Primary care provider: Physician No Ref-Primary        GURU-7 scores:    GURU-7 SCORE 1/23/2023 2/6/2023   Total Score - -   Total Score 14 12       PHQ-9 scores:   PHQ-9 SCORE 1/23/2023 2/6/2023   PHQ-9 Total Score 18 16         Anything the provider should be aware of for today's appointment: nothing reported    New (awaiting) Mental health provider or next programming: @ [facility] [place] [facility] in [facility] w/ VANDANA Bagley     Date of scheduled apt: 3/8/2023      Cecile Mcwilliams on February 6, 2023 at 1:18 PM

## 2023-02-06 NOTE — PATIENT INSTRUCTIONS
Start:  Gabapentin/Neurontin 100 mg twice daily; discontinue    Bupropion/Wellbutrin   -75 mg for 8 to 10 days in the AM  -150 mg XL in the AM thereafter      Continue:  Sertraline/Zoloft 200 mg daily     Trazodone/Desyrel 100 to 200 mg (1 to 2 tabs) as needed at bedtime  -Take 100 mg nightly  -Can take an additional 50 to 100 mg in the middle of the night if needed

## 2023-02-13 DIAGNOSIS — G47.9 SLEEP DISTURBANCES: ICD-10-CM

## 2023-02-13 RX ORDER — TRAZODONE HYDROCHLORIDE 100 MG/1
100-200 TABLET ORAL
Qty: 60 TABLET | Refills: 0 | OUTPATIENT
Start: 2023-02-13

## 2023-02-13 NOTE — TELEPHONE ENCOUNTER
Refill received from pharmacy. Pt discharged from PHP programming. Has provider through [facility] and [facility] provider in March. Forwarding this refill to current provider for approval/denial. Conducted chart review.  Kristina Zavala RN on 2/13/2023 at 9:53 AM

## 2023-02-13 NOTE — TELEPHONE ENCOUNTER
TC RN received instructions from TC provider to call and check with pharmacy if this refill is needed.    TC RN called [facility]ACY #[id number] - [place], [place] - [address] and spoke with the pharmacy who confirmed that the pharmacy has current refills for trazodone 100 mg ready for refills and that this refill request is not needed.    TC RN routed encounter to TC provider for update.

## 2023-02-19 NOTE — PROGRESS NOTES
[facility]      Mental Health & Addiction Service Line    Transition Clinic: Psychiatry Progress Note  Medication Management                Charts/documentation reviewed within the EMR:  -2023      VISIT INFORMATION      Date:  2023       Number:  -3rd      Referral source:  -Inpatient psychiatry      Patient Identifying Information:  Legal name: Jossy Matthews  Preferred name: Jossy  : 1995  Preferred pronouns: She/her      Participants:   -Patient  -Provider        Telehealth visit details:  Type of service:   Video  Patient location:   At home, Off site  Provider Location: [facility]  Platform utilized: [email or web address]    Start time: 1:36 pm  End time:  2:16 pm          INTERIM HX    -Can't tell if the Wellbutrin is doing anything  -Not sure if there are side effects or if it's my anxiety  -Headache, weird pressure sensation behind eye, every day, last 60 to 120 minutes  -Not sure if the headaches have been there all along or started after initiation of Wellbutrin         PSYCHIATRIC ROS      Sleep:  -Can't sleep without the Trazodone which really bothers me  -Taking 50 mg or a little less (sometime try to spilt the tab into )      Mood/Anxiety:  -See PHQ-9 score    -See GURU-7 score    -Not sure what I want to do with my life (career, relationship wise, etc.) at this point in time  -Also scared/worried/fearful about trying something new in part because it's hard to have the motivation to do so  -Often just want to give up or not exist       Suicidal ideations:  +Passive ideations  -No longer looking up ways to hurt myself      SIB:  -Denies engaging in self harm         Side effects:  -See above ... from Wellbutrin ??     -Reported during 2023 appointment with therapist headaches did seem more noticeable after Wellbutrin    -Poop smells/stinks more than it used to ... not sure if it's from Trazodone and or Wellbutrin  -Intermittent  loose stools  -Able to hold down water/food, no emesis         PSYCHIATRIC HISTORY      Individual therapy or IOP:   -Individual and currently attending [facility] IOP        Suicide attempts:  -1x by hanging  -Most recent: 12/28/2022        Inpatient psychiatric hospitalizations:  -1x  -Most recent: [facility], [facility] 12/29/2022 to 1/3/2023  -No hx of commitments         ECT:  -None reported            Medication Trials:        SSRIs:  -Lexapro: worsened anxiety + suicidal ideations, sweating     Other anxiolytics:  -Buspar 10 mg three times daily: discontinued on own  Gabapentin   -100 mg twice daily: ineffective  -300 mg three times daily: felt weird, out of it  -Hydroxyzine 50 mg every 4 hours: sleepy       CURRENT SUBSTANCE USE    Alcohol:   -None reported         Recreational Drugs:   -None reported         Medical Marijuana:  -None reported         Cigarettes per day:   -None reported         Chewing tobacco:   -None reported         Vaping:    -None reported         Caffeine intake:    -None reported           MEDICAL HISTORY    -The problem list was reviewed via the EMR prior to the appointment    -The patient denies any concerning physical and or medical symptoms during the interviewing process          MEDICATIONS      Current Outpatient Medications:      aspirin-acetaminophen-caffeine (EXCEDRIN EXTRA STRENGTH) 250-250-65 MG tablet, Take 1 tablet by mouth daily as needed for headaches, Disp: , Rfl:      buPROPion (WELLBUTRIN XL) 150 MG 24 hr tablet, Take 1 tablet (150 mg) by mouth every morning, Disp: 30 tablet, Rfl: 0     buPROPion (WELLBUTRIN) 75 MG tablet, Take 1 tablet (75 mg) by mouth every morning for 8 to 10 days then increase to 150 mg XL in the AM thereafter, Disp: 10 tablet, Rfl: 0     multivitamin w/minerals (CENTRUM ADULTS) tablet, Take 1 tablet by mouth daily, Disp: , Rfl:      norethindrone-ethinyl estradiol (JUNEL FE 1/20) 1-20 MG-MCG tablet, Take 1 tablet by mouth daily, Disp: , Rfl:      sertraline  (ZOLOFT) 100 MG tablet, Take 2 tablets (200 mg) by mouth daily, Disp: 180 tablet, Rfl: 0     traZODone (DESYREL) 100 MG tablet, Take 1-2 tablets (100-200 mg) by mouth nightly as needed for sleep, Disp: 60 tablet, Rfl: 0      If a controlled substance is prescribed during today's appointment:    -The [place] Prescription Monitoring Program has been reviewed and there are no current concerns with: diversionary activity, early refill requests, and or obtaining the medication from multiple providers.        VITALS    BP Readings from Last 3 Encounters:   01/03/23 110/80       Pulse Readings from Last 3 Encounters:   01/03/23 90       Wt Readings from Last 3 Encounters:   12/30/22 76.7 kg (169 lb)         LABS    The following labs were reviewed prior to or during the appointment:  -None        SCALES    PHQ 1/3/2023 1/23/2023 2/6/2023   PHQ-9 Total Score 16 18 16   Q9: Thoughts of better off dead/self-harm past 2 weeks More than half the days Several days Several days        GURU-7 SCORE 1/5/2023 1/23/2023 2/6/2023   Total Score 11 (moderate anxiety) - -   Total Score 11 14 12               MENTAL STATUS EXAMINATION    Appearance: Adequately Groomed, Attire Appropriate for the Season  General Behavior:  Cooperative, Direct Eye Contact  Speech: Fluent, Normal rate and volume  Musculoskeletal:    -Gait not observed during t.h. visit  -No facial tics/tremors observed   -Motor coordination is grossly intact   Mood:  Depressed and Anxious   Affect:  Tired, Mildly frustrated   Attention: Intact  Orientation:  Person, Place, Time, Situation  Thought Associations:  Intact  Thought Content: Reality based   Thought Processes: Organized, Normal rate  Memory: No overt impairment; no screenings or formal testing performed  Language: Intact  Judgement: Fair  Insight: Fair to good        ASSESSMENT/CLINICAL IMPRESSIONS    Summary:    Jossy Matthews is a 28 y/o female with a history of: MDD, Multiple Anxiety Disorders.     Recently  underwent first inpatient psychiatric hospitalization at [facility] 12/29/2022 to 1/3/2023 due to a suicide attempt via hanging.     Established care at the [facility] on 1/23/2023 and attended follow up on 2/6/2023 for the management of psychotropics/bridging until able to establish long term outpatient psychiatric services.     -------------------------    Jossy continues to remain depressed, unmotivated, experience anhedonia, and has anticipatory anxiety about eventually needing to return to the workplace when current [employer] eventually ends.    She's not sure if the Wellbutrin (we reviewed it was started on 2/6/2023 therefore might just need to be on it longer) is doing much of anything, and is indecisive if she doesn't or doesn't want to remain on two scheduled antidepressants: Zoloft + Wellbutrin.    It bothers her she needs the Trazodone to fall asleep, therefore reduced dosing to align with Jossy's long term goal of eventually discontinuing altogether.    She notes ongoing skin picking, hair + eyebrow pulling.    Endorses broad suicidal ideations of not wanting to exist or just give up but is no longer seeking/looking up ways to die.        DSM-V and or working diagnosis:    1. GURU     2. Moderate to Severe episode of recurrent major depressive disorder (H)     3. Sleep Disturbances      History of:     4. Social Phobia     5. Panic Disorder     6. Trichtillomania            SAFETY EVALUATION:  Suicidal ideations:  +passive ideations  -denies intent or plan  Homicidal ideations:  -denies  Risk factors:  -recent attempt, leading to iph  -ongoing psychosocial stressors  Protective and mitigating factors:  -parents  -engagement in outpatient mental health services  Risk assessment:       SAFETY PLAN:  -Has numbers of at least 1 family member + 1 friend in personal contact list  -Knows clinic number(s) + operation of regular business hours   -Reviewed to utilize 985 or text [place] to [phone] for mental health  crisis  -Discussed to call 911 and or return to the nearest Emergency Department if unable to maintain safety of self or others (due to severity of suicidal and or homicidal ideations)        TREATMENT PLAN      Medications:  Start:  Trazodone/Desyrel 25 mg (1/2 tab) at bedtime     Continue:  Bupropion/Wellbutrin 150 mg XL in the AM     Sertraline/Zoloft 200 mg daily       Labs:  -None obtained      Therapy and or Non-pharmacological modalities :  -Continue individual therapy    -Try coconut water, Liquid I.V. for electrolytes + managing intermittent loose stools          Return to Clinic or Referrals:  -You do not need to return to the Transition [facility] for medication management  -Please make sure to keep the appointment for longitudinal outpatient psychiatry services    Provider: Dr. Ofelia Bagley MD   Clinic/Location: [facility], [address] [place] [address] [place] [address]   Phone number: [phone]  Date/Time/Visit type: 3/8/2023 @ 1:30 pm, in-person            Total time: 49 minutes per:    -Review of EMR  -Appointment time   -Documentation          Sunni BRAGG-CNP, [facility]    ----------------------------------------------------------------------------------------------------------------------        TREATMENT RISK STATEMENT    The risks, benefits, alternatives, and potential adverse effects have been explained and are understood by the patient.  The patient agrees to the treatment plan with their ability to do so.      The patient knows to call the clinic: [phone]  for any problems or concerns until the next psychiatry visit, regardless if it is within or outside of the [facility] system.     If unable to reach clinic staff (via phone call or medical messaging) during the normal business hours: 8:00 am to 4:30 pm then it is recommended accessing the nearest: emergency department, urgent care facility, or utilizing local (varies  based on county of residence) and national crisis #'s or text messaging services for immediate assistance.          ----------------------------------------------------------------------------------------------------------------------        If applicable the following has been discussed with the patient, parent/guardian, and or attending family member during the appointment:      1. Risks of polypharmacy and possible drug interactions with current medication list + common OTC products, herbs, and supplements.    Moving forward, it is suggested to intermittently check-in with a clinic or retail pharmacist whenever new medications or OTC/h/s are consumed.    2. Recommendation to adhere to CDC guidelines as it relates alcohol consumption.  If taking benzodiazepines, you should abstain from alcohol intake due to increased risks of CNS and respiratory depression, as well as psychomotor impairment.    3. If possible, it is recommended to avoid concurrent use of prescribed:  opioids  +  benzodiazepines due to increased risks of CNS and respiratory depression, as well as the increased risk of overdose.     4. Recommendation to minimize and or abstain from THC use (unless the pt. is prescribed medical marijuana).    5. Recommendation to abstain from illicit substances including but not limited to the following: heroin, street fentanyl, cocaine, methamphetamines, and bath salts.    6. Do not take opioids, stimulants, and or other prescription medications unless they are specifically prescribed for you.    7. Recommendation to abstain from tobacco/smoking, alcohol, THC, and all illicit substances if trying to become or are pregnant.    8. Black Box Warnings associated with the prescribed psychotropic(s).    9. Potential adverse effects of antipsychotics including but not limited to the following: weight gain, metabolic syndrome, EPS/Tardive Dyskinesias.    10. Potential CV and neurological adverse effects of stimulants  including but not limited to the following:  sudden death, MI, stroke, HTN, cardiomyopathy (long term use) as well as seizures.

## 2023-02-22 ENCOUNTER — MYC MEDICAL ADVICE (OUTPATIENT)
Dept: BEHAVIORAL HEALTH | Facility: CLINIC | Age: 28
End: 2023-02-22

## 2023-02-22 ENCOUNTER — VIRTUAL VISIT (OUTPATIENT)
Dept: BEHAVIORAL HEALTH | Facility: CLINIC | Age: 28
End: 2023-02-22
Payer: COMMERCIAL

## 2023-02-22 DIAGNOSIS — G47.9 SLEEP DISTURBANCES: ICD-10-CM

## 2023-02-22 DIAGNOSIS — F41.1 GAD (GENERALIZED ANXIETY DISORDER): Primary | ICD-10-CM

## 2023-02-22 DIAGNOSIS — F33.1 MODERATE EPISODE OF RECURRENT MAJOR DEPRESSIVE DISORDER (H): ICD-10-CM

## 2023-02-22 PROCEDURE — 99215 OFFICE O/P EST HI 40 MIN: CPT | Mod: VID | Performed by: NURSE PRACTITIONER

## 2023-02-22 RX ORDER — BUPROPION HYDROCHLORIDE 150 MG/1
150 TABLET ORAL EVERY MORNING
Qty: 30 TABLET | Refills: 0 | Status: CANCELLED | OUTPATIENT
Start: 2023-02-22

## 2023-02-22 RX ORDER — TRAZODONE HYDROCHLORIDE 50 MG/1
25 TABLET, FILM COATED ORAL AT BEDTIME
Qty: 30 TABLET | Refills: 1 | Status: SHIPPED | OUTPATIENT
Start: 2023-02-22

## 2023-02-22 RX ORDER — TRAZODONE HYDROCHLORIDE 100 MG/1
100-200 TABLET ORAL
Qty: 60 TABLET | Refills: 0 | Status: CANCELLED | OUTPATIENT
Start: 2023-02-22

## 2023-02-22 RX ORDER — BUPROPION HYDROCHLORIDE 150 MG/1
150 TABLET ORAL EVERY MORNING
Qty: 30 TABLET | Refills: 0 | Status: SHIPPED | OUTPATIENT
Start: 2023-02-22 | End: 2023-03-08

## 2023-02-22 NOTE — Clinical Note
Isacc Bagley-  1. I told Jossy I would give you a heads up she's interested (although I informed her you would probably need more details) of changing offices for her employer as an accommodation to manage anxiety + mental health symptoms/dx and you can potentially write a letter or provide documentation (if you deem appropriate) surrounding this when returning the workplace.  2. Per chart review she also has an appointment on 3/24/2023 with [facility] ?? ... probably will need to review with her she has to settle on one long term outpatient psychiatric provider and should be bouncing around between [facility], [facility], etc.  3. Can tend to be sort of wishy/washy in report of symptoms ... yest = 2/21/2023 told the therapist headaches started after Wellbutrin, today she said she wasn't sure, so ultimately she's remaining the Wellbutrin for the time being.   4.  Let me know if there's questions/concerns related to the progress notes.  Thanks for your coordination in care.  Sunni

## 2023-02-22 NOTE — PROGRESS NOTES
" This video/telephone visit will be conducted virtually between you and your provider. We have found that certain health care needs can be provided without the need for an in-person physical exam. This service lets us provide the care you need with a video /telephone conversation. If a prescription is necessary we can send it directly to your pharmacy. If lab work is needed we can place an order for that and you can then stop by our lab to have the test done at a later time.\"   Just as we bill insurance for in-person visits, we also bill insurance for video/telephone visits. If you have questions about your insurance coverage, we recommend that you speak with your insurance company.      Patient/Parent has given verbal consent for video/Telephone visit? Yes     Patient would like the video visit invitation sent by: [employer]; If tech issues please text to cell phone: [phone]     Patient verified allergies, medications and pharmacy via phone. Patient states they are ready for visit.     Mental Health &Addiction (MH&A)Transition Clinic (TC):     Provides Patient Support While Waiting to Access Programmatic and Outpatient MH&A Care and Provides [facility] Crisis Assessment Services     FOLLOW-UP VISIT  ____________________________________________      \"The Transition Clinic is a temporary psychiatry service that helps to bridge the time to your next appointment. It is not intended to be a long-term service and you are expected to attend your scheduled appointment with your next provider.\"  [x] Patient/Parent verbalized understanding    If you need support between appointments, please call [phone] and let them know you're seen by [facility] Psychiatry. You may also send a [employer] message to reach us.    General-     Most pressing needs at this time: Check in about medication changes with Wellburtin     Mental Health currently: No changes from last visit     Any changes to your physical health: Denies      "     Medications-     Injectable medications currently prescribed: No     If yes, do you need an appointment for the next injection: NA     Any Controlled Substances that you are prescribed: None     Any refills you are aware that you'll need soon: Yes         Primary care provider: Physician No Ref-Primary        GURU-7 scores:    GURU-7 SCORE 1/23/2023 2/6/2023   Total Score - -   Total Score 14 12       PHQ-9 scores:   PHQ-9 SCORE 1/23/2023 2/6/2023   PHQ-9 Total Score 18 16         Anything the provider should be aware of for today's appointment: Discuss medications changes.     New (awaiting) Mental health provider or next programming: [facility] in [facility] w/Ofelia Bagley MD    Date of scheduled apt: 3/8/2023 @ 1:30 pm In person       Maria G Diamond CMA on February 22, 2023 at 01:21 PM

## 2023-02-22 NOTE — PATIENT INSTRUCTIONS
Start:  Trazodone/Desyrel 25 mg (1/2 tab) at bedtime     Continue:  Bupropion/Wellbutrin 150 mg XL in the AM     Sertraline/Zoloft 200 mg daily    -------------------------------------    -Try coconut water, Liquid I.V. for electrolytes + managing intermittent loose stools    -------------------------------------    -You do not need to return to the Transition Clinic for medication management  -Please make sure to keep the appointment for longitudinal outpatient psychiatry services    Provider: Dr. Ofelia Bagley MD   Clinic/Location: [facility], [address] [place] [address], [address], [place] [address]   Phone number: [phone]  Date/Time/Visit type: 3/8/2023 @ 1:30 pm, in-person

## 2023-02-23 NOTE — PROGRESS NOTES
MH&A TC   NURSING Post-Appointment Chart-check:    Correct pharmacy verified with patient and updated in chart? [x] yes []no    Medications ordered this visit were e-scribed.  Verified by order class [x] yes  [] no    List Medications: traZODone (DESYREL) 50 MG tablet; buPROPion (WELLBUTRIN XL) 150 MG 24 hr tablet    Medication changes or discontinuations were communicated to patient's pharmacy: [] yes  [x] no    UA collected [] yes  [] no  [x] n/a-virtual     Future appointment was made: [] yes  [] no  [x] n/a  Plan LOC Ofelia Bagley MD  3/8/2023   Dictation completed at time of chart check: [x] yes  [] no    I have checked the documentation for today s encounters and the above information has been reviewed and completed.      Maria G Diamond CMA on February 23, 2023 at 3:52 PM

## 2023-03-01 ENCOUNTER — TELEPHONE (OUTPATIENT)
Dept: BEHAVIORAL HEALTH | Facility: CLINIC | Age: 28
End: 2023-03-01
Payer: COMMERCIAL

## 2023-03-01 NOTE — TELEPHONE ENCOUNTER
BELTRAN RN received refill request from    [id number] - [place], [place] - [address] for buPROPion (WELLBUTRIN XL) 150 MG 24 hr tablet last filled 2/6/23.    BELTRAN RN reviewed chart for refill directives and noted:    Return to Clinic or Referrals:  -You do not need to return to the [facility] for medication management  -Please make sure to keep the appointment for longitudinal outpatient psychiatry services    Provider: Dr. Ofelia Bagley MD   Clinic/Location: [facility], [address] [place] [address] [place] [address]   Phone number: [phone]  Date/Time/Visit type: 3/8/2023 @ 1:30 pm, in-person        BELTRAN RN faxed back refill request as denied with reason patient to get future refill from Dr. Ofelia Bagley MD. BELTRAN RN also sent a MyChart reminder to patient to keep the above appointment so as to get refills at this appointment.

## 2023-03-06 ASSESSMENT — ANXIETY QUESTIONNAIRES
2. NOT BEING ABLE TO STOP OR CONTROL WORRYING: SEVERAL DAYS
5. BEING SO RESTLESS THAT IT IS HARD TO SIT STILL: SEVERAL DAYS
6. BECOMING EASILY ANNOYED OR IRRITABLE: NOT AT ALL
8. IF YOU CHECKED OFF ANY PROBLEMS, HOW DIFFICULT HAVE THESE MADE IT FOR YOU TO DO YOUR WORK, TAKE CARE OF THINGS AT HOME, OR GET ALONG WITH OTHER PEOPLE?: SOMEWHAT DIFFICULT
1. FEELING NERVOUS, ANXIOUS, OR ON EDGE: MORE THAN HALF THE DAYS
7. FEELING AFRAID AS IF SOMETHING AWFUL MIGHT HAPPEN: NOT AT ALL
GAD7 TOTAL SCORE: 7
3. WORRYING TOO MUCH ABOUT DIFFERENT THINGS: SEVERAL DAYS
GAD7 TOTAL SCORE: 7
GAD7 TOTAL SCORE: 7
7. FEELING AFRAID AS IF SOMETHING AWFUL MIGHT HAPPEN: NOT AT ALL
IF YOU CHECKED OFF ANY PROBLEMS ON THIS QUESTIONNAIRE, HOW DIFFICULT HAVE THESE PROBLEMS MADE IT FOR YOU TO DO YOUR WORK, TAKE CARE OF THINGS AT HOME, OR GET ALONG WITH OTHER PEOPLE: SOMEWHAT DIFFICULT
4. TROUBLE RELAXING: MORE THAN HALF THE DAYS

## 2023-03-08 ENCOUNTER — LAB (OUTPATIENT)
Dept: LAB | Facility: CLINIC | Age: 28
End: 2023-03-08
Attending: PSYCHIATRY & NEUROLOGY
Payer: COMMERCIAL

## 2023-03-08 ENCOUNTER — OFFICE VISIT (OUTPATIENT)
Dept: PSYCHIATRY | Facility: CLINIC | Age: 28
End: 2023-03-08
Attending: PSYCHIATRY & NEUROLOGY
Payer: COMMERCIAL

## 2023-03-08 VITALS
WEIGHT: 163 LBS | HEART RATE: 105 BPM | SYSTOLIC BLOOD PRESSURE: 112 MMHG | BODY MASS INDEX: 27.96 KG/M2 | DIASTOLIC BLOOD PRESSURE: 82 MMHG

## 2023-03-08 DIAGNOSIS — F33.2 MAJOR DEPRESSIVE DISORDER, RECURRENT EPISODE, SEVERE WITH MIXED FEATURES (H): ICD-10-CM

## 2023-03-08 DIAGNOSIS — F41.1 GAD (GENERALIZED ANXIETY DISORDER): Primary | ICD-10-CM

## 2023-03-08 LAB
ALBUMIN SERPL BCG-MCNC: 4.4 G/DL (ref 3.5–5.2)
ALP SERPL-CCNC: 40 U/L (ref 35–104)
ALT SERPL W P-5'-P-CCNC: 48 U/L (ref 10–35)
ANION GAP SERPL CALCULATED.3IONS-SCNC: 11 MMOL/L (ref 7–15)
AST SERPL W P-5'-P-CCNC: 29 U/L (ref 10–35)
BASOPHILS # BLD AUTO: 0.1 10E3/UL (ref 0–0.2)
BASOPHILS NFR BLD AUTO: 1 %
BILIRUB SERPL-MCNC: 0.2 MG/DL
BUN SERPL-MCNC: 8.7 MG/DL (ref 6–20)
CALCIUM SERPL-MCNC: 9.7 MG/DL (ref 8.6–10)
CHLORIDE SERPL-SCNC: 102 MMOL/L (ref 98–107)
CREAT SERPL-MCNC: 0.61 MG/DL (ref 0.51–0.95)
DEPRECATED HCO3 PLAS-SCNC: 27 MMOL/L (ref 22–29)
EOSINOPHIL # BLD AUTO: 0 10E3/UL (ref 0–0.7)
EOSINOPHIL NFR BLD AUTO: 0 %
ERYTHROCYTE [DISTWIDTH] IN BLOOD BY AUTOMATED COUNT: 12.4 % (ref 10–15)
GFR SERPL CREATININE-BSD FRML MDRD: >90 ML/MIN/1.73M2
GLUCOSE SERPL-MCNC: 95 MG/DL (ref 70–99)
HCT VFR BLD AUTO: 43.6 % (ref 35–47)
HGB BLD-MCNC: 14 G/DL (ref 11.7–15.7)
IMM GRANULOCYTES # BLD: 0 10E3/UL
IMM GRANULOCYTES NFR BLD: 0 %
LYMPHOCYTES # BLD AUTO: 2.3 10E3/UL (ref 0.8–5.3)
LYMPHOCYTES NFR BLD AUTO: 27 %
MCH RBC QN AUTO: 29 PG (ref 26.5–33)
MCHC RBC AUTO-ENTMCNC: 32.1 G/DL (ref 31.5–36.5)
MCV RBC AUTO: 90 FL (ref 78–100)
MONOCYTES # BLD AUTO: 0.6 10E3/UL (ref 0–1.3)
MONOCYTES NFR BLD AUTO: 8 %
NEUTROPHILS # BLD AUTO: 5.6 10E3/UL (ref 1.6–8.3)
NEUTROPHILS NFR BLD AUTO: 64 %
NRBC # BLD AUTO: 0 10E3/UL
NRBC BLD AUTO-RTO: 0 /100
PLATELET # BLD AUTO: 296 10E3/UL (ref 150–450)
POTASSIUM SERPL-SCNC: 3.8 MMOL/L (ref 3.4–5.3)
PROT SERPL-MCNC: 7.3 G/DL (ref 6.4–8.3)
RBC # BLD AUTO: 4.83 10E6/UL (ref 3.8–5.2)
SODIUM SERPL-SCNC: 140 MMOL/L (ref 136–145)
TSH SERPL DL<=0.005 MIU/L-ACNC: 1.24 UIU/ML (ref 0.3–4.2)
WBC # BLD AUTO: 8.5 10E3/UL (ref 4–11)

## 2023-03-08 PROCEDURE — 84443 ASSAY THYROID STIM HORMONE: CPT

## 2023-03-08 PROCEDURE — 36415 COLL VENOUS BLD VENIPUNCTURE: CPT

## 2023-03-08 PROCEDURE — 85025 COMPLETE CBC W/AUTO DIFF WBC: CPT

## 2023-03-08 PROCEDURE — 82306 VITAMIN D 25 HYDROXY: CPT

## 2023-03-08 PROCEDURE — 80053 COMPREHEN METABOLIC PANEL: CPT

## 2023-03-08 PROCEDURE — 90792 PSYCH DIAG EVAL W/MED SRVCS: CPT | Mod: GC | Performed by: STUDENT IN AN ORGANIZED HEALTH CARE EDUCATION/TRAINING PROGRAM

## 2023-03-08 RX ORDER — FLUOCINOLONE ACETONIDE 0.1 MG/ML
SOLUTION TOPICAL
COMMUNITY
Start: 2021-06-30

## 2023-03-08 RX ORDER — SERTRALINE HYDROCHLORIDE 100 MG/1
200 TABLET, FILM COATED ORAL DAILY
Qty: 180 TABLET | Refills: 0 | Status: SHIPPED | OUTPATIENT
Start: 2023-03-08

## 2023-03-08 RX ORDER — BUSPIRONE HYDROCHLORIDE 5 MG/1
TABLET ORAL
Qty: 106 TABLET | Refills: 0 | Status: SHIPPED | OUTPATIENT
Start: 2023-03-08 | End: 2023-04-07

## 2023-03-08 RX ORDER — NYSTATIN 100000 U/G
CREAM TOPICAL
COMMUNITY
Start: 2022-08-12

## 2023-03-08 RX ORDER — IPRATROPIUM BROMIDE 42 UG/1
SPRAY, METERED NASAL
COMMUNITY
Start: 2022-08-28

## 2023-03-08 RX ORDER — FLUTICASONE PROPIONATE 50 MCG
2 SPRAY, SUSPENSION (ML) NASAL DAILY
COMMUNITY
Start: 2022-08-14

## 2023-03-08 RX ORDER — PIMECROLIMUS 10 MG/G
CREAM TOPICAL
COMMUNITY
Start: 2022-10-24

## 2023-03-08 RX ORDER — VALACYCLOVIR HYDROCHLORIDE 500 MG/1
TABLET, FILM COATED ORAL
COMMUNITY
Start: 2022-08-11

## 2023-03-08 RX ORDER — FLUCONAZOLE 150 MG/1
TABLET ORAL
COMMUNITY
Start: 2022-10-24

## 2023-03-08 ASSESSMENT — PATIENT HEALTH QUESTIONNAIRE - PHQ9
10. IF YOU CHECKED OFF ANY PROBLEMS, HOW DIFFICULT HAVE THESE PROBLEMS MADE IT FOR YOU TO DO YOUR WORK, TAKE CARE OF THINGS AT HOME, OR GET ALONG WITH OTHER PEOPLE: SOMEWHAT DIFFICULT
SUM OF ALL RESPONSES TO PHQ QUESTIONS 1-9: 14
SUM OF ALL RESPONSES TO PHQ QUESTIONS 1-9: 14

## 2023-03-08 NOTE — NURSING NOTE
Chief Complaint   Patient presents with     Eval/Assessment     Anxiety, depression, medication management      Patient states stopped Wellbutrin one week ago. Noticed head pressure behind eyes.   Patient also states she was able to sleep before going hospital without medications. After the Hospital , can't fall asleep without taking Trazodone . Also, noticed diarrhea with and without the Wellbutrin .  Stopped Lexparo : side effects : sweating profusely , rapid heart beat, cramps ( martina horses )  Buspar 10 not effective.  Gabapentin : side effects : feels out of it   Hydroxyzine - Drowsiness   Annika Schmidt on 3/8/2023 at 1:14 PM

## 2023-03-08 NOTE — PATIENT INSTRUCTIONS
**For crisis resources, please see the information at the end of this document**   Patient Education    Thank you for coming to the [facility] [place] [facility].     Lab Testing:  If you had lab testing today and your results are reassuring or normal they will be mailed to you or sent through [employer] within 7 days. If the lab tests need quick action we will call you with the results. The phone number we will call with results is # [phone]. If this is not the best number please call our clinic and change the number.     Medication Refills:  If you need any refills please call your pharmacy and they will contact us. Our fax number for refills is [phone].   Three business days of notice are needed for general medication refill requests.   Five business days of notice are needed for controlled substance refill requests.   If you need to change to a different pharmacy, please contact the new pharmacy directly. The new pharmacy will help you get your medications transferred.     Contact Us:  Please call [phone] during business hours (8-5:00 M-F).   If you have medication related questions after clinic hours, or on the weekend, please call [phone].     Financial Assistance [phone]   Medical Records [phone]       MENTAL HEALTH CRISIS RESOURCES:  For a emergency help, please call 911 or go to the nearest Emergency Department.     Emergency Walk-In Options:   EmPATH Unit @ [facility] ([place]): [phone] - Specialized mental health emergency area designed to be calming  [facility] ([place]): [phone]  [facility] ([place]): [phone]  [facility]): [phone]    [facility] Crisis Information:   [place]: [phone]  [place]: [phone]  [place] - Adult: [phone]     Child: [phone]  [place] - Adult: [phone]     Child: [phone]  [place]:  [phone]  List of all [facility] resources:   https://[place].gov/dhs/people-we-serve/adults/health-care/mental-health/resources/crisis-contacts.jsp    National Crisis Information:   Crisis Text Line: Text  [place]  to 689883  Suicide & Crisis Lifeline: 988  National Suicide Prevention Lifeline: 0-279-989-TALK (1-505.482.2261)       For online chat options, visit https://suicidepreventionlifeline.org/chat/  Poison Control Center: 8-545-554-6127  Trans Lifeline: 0-788-817-5122 - Hotline for transgender people of all ages  The [employer] Project: 6-198-771-7536 - Hotline for LGBT youth     For Non-Emergency Support:   Fast Tracker: Mental Health & Substance Use Disorder Resources -   https://www."[employer]ckICVRxn.org/

## 2023-03-08 NOTE — PROGRESS NOTES
Telehealth Details  Type of service:  diagnostic assessment  Time of service:    Start Time:  1:30    End Time:  2:50    Distant Site (provider location):  On-site           [facility]  Psychiatry Clinic  NEW PATIENT EVALUATION     CARE TEAM:  PCP- Physician No Ref-Primary    Psychotherapist- has individual psychotherapist       Jossy Matthews is a 27 year old who uses the name Jossy and pronouns she, her, hers.      DIAGNOSIS     GURU  MDD, recurrent, severe with mixed features     ASSESSMENT     Jossy is a 27-year-old who presents today for diagnostic assessment.  Patient reports symptoms of depression and anxiety including depressed mood, hopelessness, and overwhelmed.  Although anxiety is seemingly longstanding, patient reports numerous psychosocial stressors that are exacerbating her current presentation and few coping skills.  Today, discussed plan to continue sertraline 200 mg daily.  Will also initiate retrial of BuSpar, as patient tolerated this medication previously but took for an inadequate period of time.  Additionally, patient will likely benefit from additional therapeutic modalities.  She does currently have an individual psychotherapist.  CBT for anxiety referral placed today.  She may benefit from DBT, group therapy, or family therapy in the future.  She may also benefit from a family meeting, as patient currently lives with her parents and expresses concern about her ability to live independently.  Finally, cognitive testing/neuropsych testing may be of benefit in clarifying patient's overall diagnostic picture.  No acute safety concerns today.    Future considerations:  - cognitive testing/neuropsych testing  - DBT  - family therapy vs family meeting  - patient is unlikely to tolerate an SNRI due to activation from norepinephrine.  However, she may tolerate Effexor at a low-dose  - sertraline  - Viibryd    MNPMP review was not needed today.     PLAN                     "                                                                                            1) Meds-  - Start BuSpar 5 mg twice daily for 7 days, then increase to 10 mg daily twice daily  - Continue sertraline 200 mg daily    2) Psychotherapy-continue individual psychotherapy.  Referral placed for CBT today.  Future considerations include DBT and group therapy.    3) Next due-  Labs- CBC, CMP, TSH with free T4, vitamin D ordered today  EKG- none needed at this time  Rating scales- none needed    4) Referrals- Therapy- CBT for anxiety    5) Other- none    6) Dispo- 4 weeks     PERTINENT BACKGROUND                                                    [most recent eval 03/07/23]     Jossy first experienced \"social\" anxiety in the ninth grade after she broke up with a longtime friend. At age 16 her parents \"forced\" her to get her first job. States that this was the first time she ever had \"responsibility.\"  She found the work challenging and anxiety increased. She subsequently started taking sertraline and attending therapy.    \"Since I was young my parents have always helped with things  I have never been independent.\"  Patient feels this is a contributor to anxiety.    After high school, patient attended [employer].  \"My parents paid for everything.\"  She attended for 1.5 years until her parents could not afford tuition any longer.  She then studied abroad in [employer].  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  She felt \"safe, free, independent, happy, and confident.\"  She got  and [employer] and lived with her  and their cats.  She didn't have a car and did not have to worry about insurance.  Her  handled paying bills and she \"just made the money.\"  Didn't have to pay taxes because she was on a spouse visa.  Patient remained on sertraline during this time.    In May 2019, patient came home to visit.  At that point, she decided that she did " "not want to return to [place].  She got  and moved in with her parents.  Since that time, she has held numerous short-term jobs    At age 26 patient was taken off of her mother's health insurance.  Navigating getting her own insurance and finding jobs has caused increasing anxiety.     She returned to college and has graduated.    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  As her anxiety grew, she felt that she needed a medication change.  She spoke with PCP who changed from sertraline to escitalopram.  Patient reports feeling \"terrible\" after this change.  She experienced diaphoresis, insomnia, and \"dissociative episodes.\"  A cross taper back to sertraline was attempted.  During this time, patient developed increased anxiety and suicidal thoughts in the context of insomnia.  She began researching ways to complete suicide online.  One morning, after becoming increasingly anxious about going to work, patient wrapped a belt around her neck.  She subsequently had \"second thoughts\" and removed the tie and went to bed.  She noticed petechiae on her neck the next day but went to work.  She later experienced panic and asked her parents to bring her to the emergency department.    Patient attended PHP after hospital discharge.  States she \"hated\" this.  \"Focusing on downness made me feel more down.\"  Skills did not feel helpful.    Pertinent Items Include: suicide attempt , suicidal ideation and psych hosp      SUBJECTIVE     Mood:  -  overall anxious and depressed  - \"never felt this bad before in my life physically or mentally.\"  -  feels \"Low, down, physically heavy.\"  - Regrets changing meds and going to hospital. \"Hospital made everything worse\"  - Didn't feel safe in the hospital.  - After leaving the hospital, she felt \"out of it\"  - In December could \"sleep " "without meds, shower, go to work.\"  Patient now states that she is dependent on trazodone for sleep.  -\"I'm afraid of life and afraid to live life afraid.\"   - does not want to \"wake up and face life\" in the morning  - wishes could be a different person or be my old self. No active SI. No plans.  - seeing therapist from  2x/month since January. Helpful for the most part.     Anxiety:  - \"I just want to be a person who doesn't have anxiety.\"   -\"I don't feel like I can handle being an adult... I do not have life skills.\"  - lives with parents. Afraid to move out.  \"I want to be my brother or my dad or my mom. They are all hard working and confident and don't have anxiety and are involved in activities.\"   - unable to relax. Feels worried and on edge at all times.    - patient enjoys walking.  She used to like watching movies/shows and reading.  She is no longer able to enjoy these things due to anxiety and \"feeling like I should be doing something else.\"  Likes walking    Sleep:  - Nervous she can't sleep without meds.     ADHD:  - thinks she was a good student. Liked school. Always struggled with math. Was too chatty. Had friends.   - no trouble focusing or sitting still in school.   - now has trouble focusing and sitting at her job.     Physical health:  - diarrhea since hospital discharge. Describes liquid stool several times a day. Sometimes \"emergent.\" No blood or mucus appreciated.      Recent Psych Symptoms:   Depression:  depressed mood, indecisiveness, feeling hopeless, feeling trapped and overwhelmed  Elevated:  none  Psychosis:  none  Anxiety:  excessive worry, feeling fearful, social anxiety and nervous/overwhelmed  Trauma Related:  none  Sleep: dysregulation      Recent Substance Use:     -alcohol: No   -cannabis: October/November 2022 using every day.   -tobacco: No  -caffeine:  No.  Stopped using as this increased anxiety.  -opioids: No   Narcan Kit currrent: No   -other: none    Pertinent Negatives: " "No suicidal or violent ideation, hallucinations and martha  Adverse Effects: none     FAMILY and SOCIAL HISTORY                                 pt reported     Family History:  Psychiatric: on maternal side   Suicide attempt: half uncle on maternal side   Chemical Dependency: father is recovered drug user    Social History:    Living Situation- currently living with parents  Finances- Not currently working. On Leave from admissions . Expected return April 1st.      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    Social/ Spiritual Support- parents    Other:    Patient states her childhood was \"great.\"  States \"my parents have always been great...[They] always played with me and took me outside.    Her mom works in a [employer].  Her dad works in car sales. Patient describes her mom as a \"helicopter parent.\"  \"My mom worries about everything and is anxiety producing.\"    Patient has a younger brother     PAST PSYCHIATRIC HISTORY     SIB- none  Suicide Attempt [#, most recent]- Yes: one attempt via hanging 12/28/22 in the context of anxiety and insomnia.   Suicidal Ideation Hx- Yes.  No SI since hanging attempt in December 2022  Violence/Aggression Hx- No   Psychosis Hx- No   Eating Disorder Hx- No   Psych Hosp [#, most recent]- once. 12/30/22 for suicidal ideation s/p suicide attempt via hanging.   Commitment- No   TMS/ECT- No   Outpatient Programs - Yes: PHP      PAST MED TRIALS      Medication Max Dose (mg) Dates / Duration Helpful? DC Reason / " "Adverse Effects?   Sertraline  200 mg      Lexapro       Trazodone  50 mg      Gabapentin     \"felt terrible and out of it\"   BuSpar     does not know if this was helpful   Wellbutrin     does not know if this was helpful             PAST SUBSTANCE USE HISTORY     Past Use-   -alcohol: No   -cannabis: Yes: Started using regularly ~2019.  October/November 2022 using every day.    -tobacco: No  -caffeine:  Yes: previously 1 cup coffee daily and tea during the day. Induces anxiety.   -opioids: No   Narcan Kit current: No   -other: none    Treatment- #, most recent- No   Medical Consequences- No   Legal Consequences- No   Other- none     MEDICAL HISTORY and ALLERGY     ALLERGIES: Patient has no known allergies.    Patient Active Problem List   Diagnosis     GURU (generalized anxiety disorder)     Major depressive disorder, recurrent episode, severe with mixed features (H)     Insomnia due to other mental disorder        MEDICAL REVIEW OF SYSTEMS   Contraception-  Yes OCP pregnant- No  A comprehensive review of systems was performed and is negative other than noted in the HPI.     MEDICATIONS     Current Outpatient Medications   Medication Sig Dispense Refill     aspirin-acetaminophen-caffeine (EXCEDRIN EXTRA STRENGTH) 250-250-65 MG tablet Take 1 tablet by mouth daily as needed for headaches       buPROPion (WELLBUTRIN XL) 150 MG 24 hr tablet Take 1 tablet (150 mg) by mouth every morning 30 tablet 0     multivitamin w/minerals (CENTRUM ADULTS) tablet Take 1 tablet by mouth daily       norethindrone-ethinyl estradiol (JUNEL FE 1/20) 1-20 MG-MCG tablet Take 1 tablet by mouth daily       sertraline (ZOLOFT) 100 MG tablet Take 2 tablets (200 mg) by mouth daily 180 tablet 0     traZODone (DESYREL) 50 MG tablet Take 0.5 tablets (25 mg) by mouth At Bedtime 30 tablet 1      VITALS   LMP 01/23/2023 (Exact Date)     MENTAL STATUS EXAM     Alertness: alert  and oriented  Appearance: adequately groomed  Behavior/Demeanor: " cooperative, pleasant and calm, with good  eye contact   Speech: normal and regular rate and rhythm  Language: intact  Psychomotor: normal or unremarkable  Mood: anxious  Affect: full range; congruent to: mood- yes, content- yes  Thought Process/Associations: unremarkable  Thought Content:  Reports none;  Denies suicidal & violent ideation and delusions  Perception:  Reports none;  Denies hallucinations  Insight: good  Judgment: good  Cognition: does  appear grossly intact; formal cognitive testing was not done  Gait and Station: unremarkable     LABS and DATA     PHQ 1/3/2023 1/23/2023 2/6/2023   PHQ-9 Total Score 16 18 16   Q9: Thoughts of better off dead/self-harm past 2 weeks More than half the days Several days Several days       No lab results found.  No lab results found.    ECG none     PSYCHOTROPIC DRUG INTERACTIONS                                           PSYCHCLINICDDI   ADDITIVE SEROTONERGIC: BuSpar + sertraline + trazodone  ADDITIVE BLEEDING RISK: trazodone + sertraline     MANAGEMENT:  Monitoring for adverse effects and routine vitals     RISK STATEMENT for SAFETY     Jossy did not appear to be an imminent safety risk to self or others.    TREATMENT RISK STATEMENT: The risks, benefits, alternatives and potential adverse effects have been discussed and are understood by the pt. The pt understands the risks of using street drugs or alcohol. There are no medical contraindications, the pt agrees to treatment with the ability to do so. The pt knows to call the clinic for any problems or to access emergency care if needed.  Medical and substance use concerns are documented above.  Psychotropic drug interaction check was done, including changes made today.     PROVIDER: Ofelia Bagley MD    Patient staffed in clinic with Dr. Goel who will sign the note.  Supervisor is Dr. Hood.

## 2023-03-09 LAB — DEPRECATED CALCIDIOL+CALCIFEROL SERPL-MC: 51 UG/L (ref 20–75)

## 2023-03-20 ENCOUNTER — TELEPHONE (OUTPATIENT)
Dept: BEHAVIORAL HEALTH | Facility: CLINIC | Age: 28
End: 2023-03-20

## 2023-03-20 NOTE — TELEPHONE ENCOUNTER
Transition Clinic RN received refill request from  [facility] #[id number] - [place], [place] - [address] AT [facility]. [address] for    buPROPion (WELLBUTRIN XL) 150 MG 24 hr tablet (Discontinued) 30 tablet 0 2/22/2023 3/8/2023 No   Sig - Route: Take 1 tablet (150 mg) by mouth every morning - Oral   Patient not taking: Reported on 3/8/2023        Sent to pharmacy as: buPROPion HCl ER (XL) 150 MG Oral Tablet Extended Release 24 Hour (WELLBUTRIN XL)   Class: E-Prescribe   Reason for Discontinue: Med Rec(No AVS / No eCancel)   Order: [id number]   E-Prescribing Status: Receipt confirmed by pharmacy (2/22/2023  2:25 PM CST)     Transition Clinic RN reviewed chart and noted patient kept next level of care.  RN denied refill request indicating: RN faxed back refill request as denied as patient transferred to next level of care.     Provider: Dr. Ofelia Bagley MD   Clinic/Location: [facility], [address] [place] [address], [address], [place] [address]   Phone number: [phone]  Date/Time/Visit type: 3/8/2023 @ 1:30 pm, in-person

## 2023-03-22 NOTE — TELEPHONE ENCOUNTER
Transition Clinic RN received refill request for buPROPion (WELLBUTRIN XL) 150 MG 24 hr tablet.  RN reviewed patient chart. RN denied refill request indicating: This patient is no longer under Sunni Gonzalez CNP's care. Forward refills to Dr. Ofelia Bagley MD

## 2024-01-01 ENCOUNTER — HEALTH MAINTENANCE LETTER (OUTPATIENT)
Age: 29
End: 2024-01-01